# Patient Record
Sex: MALE | Race: WHITE | NOT HISPANIC OR LATINO
[De-identification: names, ages, dates, MRNs, and addresses within clinical notes are randomized per-mention and may not be internally consistent; named-entity substitution may affect disease eponyms.]

---

## 2018-06-12 ENCOUNTER — TRANSCRIPTION ENCOUNTER (OUTPATIENT)
Age: 53
End: 2018-06-12

## 2022-03-08 PROBLEM — Z00.00 ENCOUNTER FOR PREVENTIVE HEALTH EXAMINATION: Status: ACTIVE | Noted: 2022-03-08

## 2022-03-15 ENCOUNTER — APPOINTMENT (OUTPATIENT)
Dept: OTOLARYNGOLOGY | Facility: CLINIC | Age: 57
End: 2022-03-15
Payer: MEDICAID

## 2022-03-15 VITALS
BODY MASS INDEX: 25.33 KG/M2 | SYSTOLIC BLOOD PRESSURE: 185 MMHG | HEART RATE: 106 BPM | WEIGHT: 171 LBS | DIASTOLIC BLOOD PRESSURE: 87 MMHG | HEIGHT: 69 IN

## 2022-03-15 PROCEDURE — 31575 DIAGNOSTIC LARYNGOSCOPY: CPT

## 2022-03-15 PROCEDURE — 99203 OFFICE O/P NEW LOW 30 MIN: CPT | Mod: 25

## 2022-03-15 NOTE — PHYSICAL EXAM
[FreeTextEntry1] : palp firm poorly mobile L level 2 mass [Midline] : trachea located in midline position [Normal] : no rashes

## 2022-03-15 NOTE — PROCEDURE
[Lesion] : lesion identified by mirror examination needing further evaluation [Dysphagia] : dysphagia not clearly evaluated by indirect laryngoscopy [None] : none [Flexible Endoscope] : examined with the flexible endoscope [Mass ___ cm] : [unfilled]Ucm mass on the base of the tongue [Normal] : normal [de-identified] : pt w raised L BOT mass ext to vallec and midline.  Mot clear of ext over PE fold. Nl bilat TVF motion [de-identified] : see above

## 2022-03-15 NOTE — HISTORY OF PRESENT ILLNESS
[de-identified] : 58 yo male who is being referred by Dr. Sultana (otolaryngologist) for suspected neoplasm of BOT.\par 3/4/2022 Ct STN scan confirms mucosal based soft tissue mass of the left oropharynx\par 2/22/2022 Ct maxillofacial scan showing highly suspicious but indeterminate soft tissue fullness noted of oropharynx, left glossotonsillar bot/vallecula measuring approximately 2.5 cm . A suspicious left level 2A lymph node  appears somewhat heterogeneous,  may have some cystic components, measures up to 1.6 cm \par Non smoker, no alcohol\par \par this was found on imaging barnes nasal polyps/? sinusitis-. Prior though pt was having int L tongue pain and ear ache around December .  Imaging did  the above issue.  No wt loss.  Pt not a smoker.

## 2022-03-15 NOTE — CONSULT LETTER
[Dear  ___] : Dear  [unfilled], [Consult Letter:] : I had the pleasure of evaluating your patient, [unfilled]. [Please see my note below.] : Please see my note below. [Consult Closing:] : Thank you very much for allowing me to participate in the care of this patient.  If you have any questions, please do not hesitate to contact me. [Sincerely,] : Sincerely, [FreeTextEntry2] : Erick Sultana MD ( [place], [place]) [FreeTextEntry3] : Ambrocio Tena MD, FACS\par \par    [facility] [place]\par Associate Chair\par    Department of Otolaryngology\par \par Professor\par Otolaryngology & Molecular Medicine\par [facility] [facility] [employer]\par

## 2022-03-17 ENCOUNTER — OUTPATIENT (OUTPATIENT)
Dept: OUTPATIENT SERVICES | Facility: HOSPITAL | Age: 57
LOS: 1 days | End: 2022-03-17
Payer: MEDICAID

## 2022-03-17 VITALS
DIASTOLIC BLOOD PRESSURE: 79 MMHG | WEIGHT: 162.92 LBS | RESPIRATION RATE: 16 BRPM | TEMPERATURE: 99 F | OXYGEN SATURATION: 98 % | HEART RATE: 87 BPM | HEIGHT: 68.5 IN | SYSTOLIC BLOOD PRESSURE: 147 MMHG

## 2022-03-17 DIAGNOSIS — C01 MALIGNANT NEOPLASM OF BASE OF TONGUE: ICD-10-CM

## 2022-03-17 DIAGNOSIS — C77.0 SECONDARY AND UNSPECIFIED MALIGNANT NEOPLASM OF LYMPH NODES OF HEAD, FACE AND NECK: ICD-10-CM

## 2022-03-17 LAB
ALBUMIN SERPL ELPH-MCNC: 5 G/DL — SIGNIFICANT CHANGE UP (ref 3.3–5)
ALP SERPL-CCNC: 56 U/L — SIGNIFICANT CHANGE UP (ref 40–120)
ALT FLD-CCNC: 23 U/L — SIGNIFICANT CHANGE UP (ref 4–41)
ANION GAP SERPL CALC-SCNC: 14 MMOL/L — SIGNIFICANT CHANGE UP (ref 7–14)
AST SERPL-CCNC: 19 U/L — SIGNIFICANT CHANGE UP (ref 4–40)
BILIRUB SERPL-MCNC: 0.6 MG/DL — SIGNIFICANT CHANGE UP (ref 0.2–1.2)
BUN SERPL-MCNC: 18 MG/DL — SIGNIFICANT CHANGE UP (ref 7–23)
CALCIUM SERPL-MCNC: 9.9 MG/DL — SIGNIFICANT CHANGE UP (ref 8.4–10.5)
CHLORIDE SERPL-SCNC: 97 MMOL/L — LOW (ref 98–107)
CO2 SERPL-SCNC: 26 MMOL/L — SIGNIFICANT CHANGE UP (ref 22–31)
CREAT SERPL-MCNC: 0.66 MG/DL — SIGNIFICANT CHANGE UP (ref 0.5–1.3)
EGFR: 109 ML/MIN/1.73M2 — SIGNIFICANT CHANGE UP
GLUCOSE SERPL-MCNC: 91 MG/DL — SIGNIFICANT CHANGE UP (ref 70–99)
HCT VFR BLD CALC: 45.4 % — SIGNIFICANT CHANGE UP (ref 39–50)
HGB BLD-MCNC: 15.4 G/DL — SIGNIFICANT CHANGE UP (ref 13–17)
MCHC RBC-ENTMCNC: 32 PG — SIGNIFICANT CHANGE UP (ref 27–34)
MCHC RBC-ENTMCNC: 33.9 GM/DL — SIGNIFICANT CHANGE UP (ref 32–36)
MCV RBC AUTO: 94.2 FL — SIGNIFICANT CHANGE UP (ref 80–100)
NRBC # BLD: 0 /100 WBCS — SIGNIFICANT CHANGE UP
NRBC # FLD: 0 K/UL — SIGNIFICANT CHANGE UP
PLATELET # BLD AUTO: 244 K/UL — SIGNIFICANT CHANGE UP (ref 150–400)
POTASSIUM SERPL-MCNC: 3.7 MMOL/L — SIGNIFICANT CHANGE UP (ref 3.5–5.3)
POTASSIUM SERPL-SCNC: 3.7 MMOL/L — SIGNIFICANT CHANGE UP (ref 3.5–5.3)
PROT SERPL-MCNC: 7.6 G/DL — SIGNIFICANT CHANGE UP (ref 6–8.3)
RBC # BLD: 4.82 M/UL — SIGNIFICANT CHANGE UP (ref 4.2–5.8)
RBC # FLD: 13.2 % — SIGNIFICANT CHANGE UP (ref 10.3–14.5)
SODIUM SERPL-SCNC: 137 MMOL/L — SIGNIFICANT CHANGE UP (ref 135–145)
WBC # BLD: 9.82 K/UL — SIGNIFICANT CHANGE UP (ref 3.8–10.5)
WBC # FLD AUTO: 9.82 K/UL — SIGNIFICANT CHANGE UP (ref 3.8–10.5)

## 2022-03-17 PROCEDURE — 93010 ELECTROCARDIOGRAM REPORT: CPT

## 2022-03-17 RX ORDER — SODIUM CHLORIDE 9 MG/ML
1000 INJECTION, SOLUTION INTRAVENOUS
Refills: 0 | Status: DISCONTINUED | OUTPATIENT
Start: 2022-03-24 | End: 2022-04-08

## 2022-03-17 NOTE — H&P PST ADULT - HISTORY OF PRESENT ILLNESS
56 y/o male  with hx of sinusitis, nasal polyps, s/p CT scan imaging.  Neoplasm of base of tongue incidentally discovered on imaging.  Scheduled for Direct laryngoscopy oropharynx biopsy

## 2022-03-17 NOTE — H&P PST ADULT - ENMT COMMENTS
hx of sinusitis, nasal polyps, and pain to tongue. s/p CT scan imaging.  Neoplasm of base of tongue incidentally discovered on imaging.  s/p 5 day course of prednisone taper which completed several days ago. Scheduled for Direct laryngoscopy oropharynx biopsy neoplasm of base of tongue per dx

## 2022-03-17 NOTE — H&P PST ADULT - NSICDXPASTMEDICALHX_GEN_ALL_CORE_FT
PAST MEDICAL HISTORY:  Anxiety     Asthma hx of hospitalization 20 years ago.  presesntly Well controlled with present medication regimen     PAST MEDICAL HISTORY:  Anxiety     Asthma hx of hospitalization 20 years ago.  presesntly Well controlled with present medication regimen    Neoplasm of base of tongue

## 2022-03-17 NOTE — H&P PST ADULT - NSANTHOSAYNRD_GEN_A_CORE
No. YUKI screening performed.  STOP BANG Legend: 0-2 = LOW Risk; 3-4 = INTERMEDIATE Risk; 5-8 = HIGH Risk

## 2022-03-17 NOTE — H&P PST ADULT - NEUROLOGICAL
From: Jo Ann Campos  To: Clotilde Kapoor MD  Sent: 5/20/2019 11:22 PM EDT  Subject: Non-Urgent Medical Question    Hi there it's been 3 months since I started taking a larger dosage for my thyroid problem. Can you give permission for me to visit the lab this week for a current blood test to see if medicine is working?  Started with 25 mg and have been taking 50mg for three months now  [employer] negative detailed exam

## 2022-03-17 NOTE — H&P PST ADULT - RESPIRATORY AND THORAX COMMENTS
Hx of asthma, hospitalized x1 20 years ago.  Now  well controlled on present medication.  Uses rescue ezequiel haler approx 1x/wk

## 2022-03-23 ENCOUNTER — TRANSCRIPTION ENCOUNTER (OUTPATIENT)
Age: 57
End: 2022-03-23

## 2022-03-23 NOTE — ASU PATIENT PROFILE, ADULT - FALL HARM RISK - UNIVERSAL INTERVENTIONS
Bed in lowest position, wheels locked, appropriate side rails in place/Call bell, personal items and telephone in reach/Instruct patient to call for assistance before getting out of bed or chair/Non-slip footwear when patient is out of bed/[place] to call system/Physically safe environment - no spills, clutter or unnecessary equipment/Purposeful Proactive Rounding/Room/bathroom lighting operational, light cord in reach

## 2022-03-23 NOTE — ASU PATIENT PROFILE, ADULT - NSICDXPASTMEDICALHX_GEN_ALL_CORE_FT
PAST MEDICAL HISTORY:  Anxiety     Asthma hx of hospitalization 20 years ago.  presesntly Well controlled with present medication regimen    Neoplasm of base of tongue

## 2022-03-24 ENCOUNTER — RESULT REVIEW (OUTPATIENT)
Age: 57
End: 2022-03-24

## 2022-03-24 ENCOUNTER — TRANSCRIPTION ENCOUNTER (OUTPATIENT)
Age: 57
End: 2022-03-24

## 2022-03-24 ENCOUNTER — APPOINTMENT (OUTPATIENT)
Dept: OTOLARYNGOLOGY | Facility: HOSPITAL | Age: 57
End: 2022-03-24

## 2022-03-24 ENCOUNTER — OUTPATIENT (OUTPATIENT)
Dept: OUTPATIENT SERVICES | Facility: HOSPITAL | Age: 57
LOS: 1 days | Discharge: HOME | End: 2022-03-24
Payer: MEDICAID

## 2022-03-24 VITALS
TEMPERATURE: 98 F | HEART RATE: 78 BPM | DIASTOLIC BLOOD PRESSURE: 71 MMHG | OXYGEN SATURATION: 99 % | SYSTOLIC BLOOD PRESSURE: 140 MMHG | WEIGHT: 162.04 LBS | HEIGHT: 68 IN | RESPIRATION RATE: 16 BRPM

## 2022-03-24 VITALS
RESPIRATION RATE: 15 BRPM | HEART RATE: 74 BPM | OXYGEN SATURATION: 94 % | SYSTOLIC BLOOD PRESSURE: 152 MMHG | DIASTOLIC BLOOD PRESSURE: 89 MMHG

## 2022-03-24 DIAGNOSIS — C01 MALIGNANT NEOPLASM OF BASE OF TONGUE: ICD-10-CM

## 2022-03-24 PROCEDURE — 88334 PATH CONSLTJ SURG CYTO XM EA: CPT | Mod: 26,59

## 2022-03-24 PROCEDURE — 88342 IMHCHEM/IMCYTCHM 1ST ANTB: CPT | Mod: 26,59

## 2022-03-24 PROCEDURE — 88367 INSITU HYBRIDIZATION AUTO: CPT | Mod: 26

## 2022-03-24 PROCEDURE — 88331 PATH CONSLTJ SURG 1 BLK 1SPC: CPT | Mod: 26

## 2022-03-24 PROCEDURE — 88365 INSITU HYBRIDIZATION (FISH): CPT | Mod: 26,59

## 2022-03-24 PROCEDURE — 31535 LARYNGOSCOPY W/BIOPSY: CPT | Mod: GC

## 2022-03-24 PROCEDURE — 88305 TISSUE EXAM BY PATHOLOGIST: CPT | Mod: 26

## 2022-03-24 PROCEDURE — 88341 IMHCHEM/IMCYTCHM EA ADD ANTB: CPT | Mod: 26,59

## 2022-03-24 RX ORDER — OXYCODONE HYDROCHLORIDE 5 MG/1
5 TABLET ORAL ONCE
Refills: 0 | Status: DISCONTINUED | OUTPATIENT
Start: 2022-03-24 | End: 2022-03-24

## 2022-03-24 RX ORDER — OXYCODONE HYDROCHLORIDE 5 MG/1
1 TABLET ORAL
Qty: 8 | Refills: 0
Start: 2022-03-24 | End: 2022-03-25

## 2022-03-24 RX ORDER — OXYCODONE HYDROCHLORIDE 5 MG/1
5 TABLET ORAL EVERY 6 HOURS
Refills: 0 | Status: DISCONTINUED | OUTPATIENT
Start: 2022-03-24 | End: 2022-03-24

## 2022-03-24 RX ORDER — FENTANYL CITRATE 50 UG/ML
25 INJECTION INTRAVENOUS
Refills: 0 | Status: DISCONTINUED | OUTPATIENT
Start: 2022-03-24 | End: 2022-03-24

## 2022-03-24 RX ORDER — OXYCODONE HYDROCHLORIDE 5 MG/1
10 TABLET ORAL EVERY 6 HOURS
Refills: 0 | Status: DISCONTINUED | OUTPATIENT
Start: 2022-03-24 | End: 2022-03-24

## 2022-03-24 RX ORDER — ONDANSETRON 8 MG/1
4 TABLET, FILM COATED ORAL ONCE
Refills: 0 | Status: DISCONTINUED | OUTPATIENT
Start: 2022-03-24 | End: 2022-04-08

## 2022-03-24 RX ORDER — SODIUM CHLORIDE 9 MG/ML
1000 INJECTION, SOLUTION INTRAVENOUS
Refills: 0 | Status: DISCONTINUED | OUTPATIENT
Start: 2022-03-24 | End: 2022-04-08

## 2022-03-24 RX ORDER — ACETAMINOPHEN 500 MG
650 TABLET ORAL EVERY 6 HOURS
Refills: 0 | Status: DISCONTINUED | OUTPATIENT
Start: 2022-03-24 | End: 2022-04-08

## 2022-03-24 NOTE — ASU DISCHARGE PLAN (ADULT/PEDIATRIC) - CARE PROVIDER_API CALL
Ambrocio Tena)  [facility]; Otolaryngology  [address] [place] [id number]  Phone: [phone]  Fax: [phone]  Follow Up Time:

## 2022-03-24 NOTE — ASU DISCHARGE PLAN (ADULT/PEDIATRIC) - NS MD DC FALL RISK RISK
For information on Fall & Injury Prevention, visit: [email or web address] OR  [email or web address] OR  https://www.cdc.gov/steadi/patient.html

## 2022-03-24 NOTE — ASU DISCHARGE PLAN (ADULT/PEDIATRIC) - ASU DC SPECIAL INSTRUCTIONSFT
Take tylenol or oxycodone for pain as needed.    Call clinic or come to ED if issues breathing or bleeding from mouth that does not stop.    Warning Signs:  Please call your doctor or nurse practitioner if you experience the following:  *You experience new chest pain, pressure, squeezing or tightness.  *New or worsening cough, shortness of breath, or wheeze.  *If you are vomiting and cannot keep down fluids or your medications.  *You are getting dehydrated due to continued vomiting, diarrhea, or other reasons. Signs of dehydration include dry mouth, rapid heartbeat, or feeling dizzy or faint when standing.  *You experience burning when you urinate, have blood in your urine, or experience a discharge.  *Your pain is not improving within 8-12 hours or is not gone within 24 hours.  *You have shaking chills, or fever greater than 101.5 degrees Fahrenheit or 38 degrees Celsius.  *Any change in your symptoms, or any new symptoms that concern you.

## 2022-03-28 ENCOUNTER — NON-APPOINTMENT (OUTPATIENT)
Age: 57
End: 2022-03-28

## 2022-03-28 PROBLEM — F41.9 ANXIETY DISORDER, UNSPECIFIED: Chronic | Status: ACTIVE | Noted: 2022-03-17

## 2022-03-28 PROBLEM — J45.909 UNSPECIFIED ASTHMA, UNCOMPLICATED: Chronic | Status: ACTIVE | Noted: 2022-03-17

## 2022-03-28 PROBLEM — D49.0 NEOPLASM OF UNSPECIFIED BEHAVIOR OF DIGESTIVE SYSTEM: Chronic | Status: ACTIVE | Noted: 2022-03-17

## 2022-03-29 ENCOUNTER — NON-APPOINTMENT (OUTPATIENT)
Age: 57
End: 2022-03-29

## 2022-03-30 ENCOUNTER — OUTPATIENT (OUTPATIENT)
Dept: OUTPATIENT SERVICES | Facility: HOSPITAL | Age: 57
LOS: 1 days | Discharge: HOME | End: 2022-03-30

## 2022-03-30 DIAGNOSIS — C02.9 MALIGNANT NEOPLASM OF TONGUE, UNSPECIFIED: ICD-10-CM

## 2022-03-31 LAB — SURGICAL PATHOLOGY STUDY: SIGNIFICANT CHANGE UP

## 2022-04-01 ENCOUNTER — APPOINTMENT (OUTPATIENT)
Dept: RADIATION ONCOLOGY | Facility: CLINIC | Age: 57
End: 2022-04-01
Payer: MEDICAID

## 2022-04-01 ENCOUNTER — NON-APPOINTMENT (OUTPATIENT)
Age: 57
End: 2022-04-01

## 2022-04-01 ENCOUNTER — APPOINTMENT (OUTPATIENT)
Dept: HEMATOLOGY ONCOLOGY | Facility: CLINIC | Age: 57
End: 2022-04-01
Payer: MEDICAID

## 2022-04-01 ENCOUNTER — RESULT REVIEW (OUTPATIENT)
Age: 57
End: 2022-04-01

## 2022-04-01 VITALS
HEIGHT: 69 IN | HEART RATE: 75 BPM | DIASTOLIC BLOOD PRESSURE: 79 MMHG | SYSTOLIC BLOOD PRESSURE: 153 MMHG | BODY MASS INDEX: 24.44 KG/M2 | OXYGEN SATURATION: 96 % | WEIGHT: 165.04 LBS

## 2022-04-01 VITALS
WEIGHT: 166 LBS | RESPIRATION RATE: 16 BRPM | HEART RATE: 84 BPM | DIASTOLIC BLOOD PRESSURE: 74 MMHG | BODY MASS INDEX: 24.59 KG/M2 | SYSTOLIC BLOOD PRESSURE: 166 MMHG | OXYGEN SATURATION: 99 % | HEIGHT: 69 IN

## 2022-04-01 DIAGNOSIS — Z86.59 PERSONAL HISTORY OF OTHER MENTAL AND BEHAVIORAL DISORDERS: ICD-10-CM

## 2022-04-01 DIAGNOSIS — Z78.9 OTHER SPECIFIED HEALTH STATUS: ICD-10-CM

## 2022-04-01 DIAGNOSIS — Z80.1 FAMILY HISTORY OF MALIGNANT NEOPLASM OF TRACHEA, BRONCHUS AND LUNG: ICD-10-CM

## 2022-04-01 LAB
BASOPHILS # BLD AUTO: 0.1 K/UL — SIGNIFICANT CHANGE UP (ref 0–0.2)
BASOPHILS NFR BLD AUTO: 1.4 % — SIGNIFICANT CHANGE UP (ref 0–2)
EOSINOPHIL # BLD AUTO: 1 K/UL — HIGH (ref 0–0.5)
EOSINOPHIL NFR BLD AUTO: 12.8 % — HIGH (ref 0–6)
HCT VFR BLD CALC: 45.5 % — SIGNIFICANT CHANGE UP (ref 39–50)
HGB BLD-MCNC: 14.8 G/DL — SIGNIFICANT CHANGE UP (ref 13–17)
LYMPHOCYTES # BLD AUTO: 1.7 K/UL — SIGNIFICANT CHANGE UP (ref 1–3.3)
LYMPHOCYTES # BLD AUTO: 21.4 % — SIGNIFICANT CHANGE UP (ref 13–44)
MCHC RBC-ENTMCNC: 32.2 PG — SIGNIFICANT CHANGE UP (ref 27–34)
MCHC RBC-ENTMCNC: 32.5 G/DL — SIGNIFICANT CHANGE UP (ref 32–36)
MCV RBC AUTO: 99.1 FL — SIGNIFICANT CHANGE UP (ref 80–100)
MONOCYTES # BLD AUTO: 0.7 K/UL — SIGNIFICANT CHANGE UP (ref 0–0.9)
MONOCYTES NFR BLD AUTO: 8.6 % — SIGNIFICANT CHANGE UP (ref 2–14)
NEUTROPHILS # BLD AUTO: 4.3 K/UL — SIGNIFICANT CHANGE UP (ref 1.8–7.4)
NEUTROPHILS NFR BLD AUTO: 55.8 % — SIGNIFICANT CHANGE UP (ref 43–77)
PLATELET # BLD AUTO: 201 K/UL — SIGNIFICANT CHANGE UP (ref 150–400)
RBC # BLD: 4.59 M/UL — SIGNIFICANT CHANGE UP (ref 4.2–5.8)
RBC # FLD: 12.3 % — SIGNIFICANT CHANGE UP (ref 10.3–14.5)
WBC # BLD: 7.7 K/UL — SIGNIFICANT CHANGE UP (ref 3.8–10.5)
WBC # FLD AUTO: 7.7 K/UL — SIGNIFICANT CHANGE UP (ref 3.8–10.5)

## 2022-04-01 PROCEDURE — 99205 OFFICE O/P NEW HI 60 MIN: CPT | Mod: 25

## 2022-04-01 PROCEDURE — 99205 OFFICE O/P NEW HI 60 MIN: CPT

## 2022-04-01 RX ORDER — GABAPENTIN 600 MG/1
600 TABLET, COATED ORAL
Refills: 0 | Status: ACTIVE | COMMUNITY

## 2022-04-01 RX ORDER — FLUTICASONE PROPIONATE 50 MCG
SPRAY, SUSPENSION NASAL
Refills: 0 | Status: ACTIVE | COMMUNITY

## 2022-04-01 NOTE — ASSESSMENT
[FreeTextEntry1] : 57 year old man with no significant past medical history who presents to the oncology clinic with locally advanced oropharyngeal cancer, cT4N, who presents to the hematolgoy clinic to discuss definitive cRT \par \par # oropharyngeal cancer \par - I explained to the patient the natural history of head and neck cancer, and explained the role of chemotherapy as part of definitive treatment to be given with radiation. I also explained the risks and benefits of giving chemotherapy weekly or every 3 weeks. The patient was agreeable to treatment with cisplatin to be given weekly.  I consented the patient for treatment. Patient was consented for cisplatin\par - nutrition consult\par - gastroenterology consult for early referral in case PEG is needed in the future. \par - follow PET-CT imaging prior to initiation of therapy. \par - follow up in 4 weeks

## 2022-04-01 NOTE — RESULTS/DATA
[FreeTextEntry1] : 3/24/22 Pathology:  Tongue, left base of tongue tumor, excision:  \par Squamous cell carcinoma, non-keratinizing.  \par p16 and HPV stains are pending and an addendum will be issued

## 2022-04-01 NOTE — PHYSICAL EXAM
[Fully active, able to carry on all pre-disease performance without restriction] : Status 0 - Fully active, able to carry on all pre-disease performance without restriction [Thin] : thin [Normal] : affect appropriate [de-identified] : palpable cervical lymph node on the left

## 2022-04-01 NOTE — LETTER CLOSING
[FreeTextEntry3] : Abelino Kennedy MD\par  of Radiation Medicine, Quality and Safety\par  of Radiation Medicine\par [facility] [employer] [facility]\par [facility]\par [facility]\par

## 2022-04-01 NOTE — DISEASE MANAGEMENT
[N/A] : Currently not applicable [FreeTextEntry4] : SCCa BOT, p16 pending [TTNM] : 4 [NTNM] : 1 [MTNM] : x

## 2022-04-01 NOTE — REVIEW OF SYSTEMS
[Negative] : Allergic/Immunologic [Edema Limbs: Grade 0] : Edema Limbs: Grade 0  [Fatigue: Grade 0] : Fatigue: Grade 0 [Localized Edema: Grade 0] : Localized Edema: Grade 0  [Neck Edema: Grade 0] : Neck Edema: Grade 0 [Oral Pain: Grade 1 - Mild pain] : Oral Pain: Grade 1 - Mild pain [Dysgeusia: Grade 1- Altered taste but no change in diet] : Dysgeusia: Grade 1 - Altered taste but no change in diet [Hoarseness: Grade 0] : Hoarseness: Grade 0 [Voice Alteration: Grade 0] : Voice Alteration: Grade 0

## 2022-04-01 NOTE — PHYSICAL EXAM
[Nondistended] : nondistended [Sclera] : the sclera and conjunctiva were normal [Extraocular Movements] : extraocular movements were intact [Normal] : normal skin color and pigmentation and no rash [de-identified] : good dentition, no visible oropharynx lesions [de-identified] : no palpable lymphadenopathy

## 2022-04-01 NOTE — HISTORY OF PRESENT ILLNESS
[de-identified] : 56 yo M with h/o chronic sinusitis who was diagnosed with SCC of the left BOT in March 2022.\par \par He saw ENT, Dr. Erick Sultana, c/o headaches, facial pain and left ear pain.  CT maxillofacial sinus on 2/22/22 showed highly suspicious soft tissue fullness oropharynx left glossotonsilar sulcus, base of tongue/vallecula measuring 2.5 x 2.3 concerning for neoplasm. Suspicious left level 2A lymph node measure 1.6 cm.  Follow up CT neck on 3/4/22 showed soft tissue mass left oropharynx 28.5 x 23.9 x 34.3 mm with involvement of the intrinsic tongue muscles (T4).  2 unilateral pathologic left 2A lymph nodes with cystic necrosis concerning for tory metastasis (N1).\par \par He next saw ENT surgery, Dr. Renny Tena, where he was scheduled for EUA on 3/24/22 which stated "by palpation, the patient was found to have a broadly based tumor epicentered in his tongue base.  Anteriorly, it extended to the proximal tongue base circumvallate papilla region.  It extended laterally to, but not  across the glossotonsillar sulcus anteriorly and more posteriorly, laterally it extended to, but not across the glossopharyngeal sulcus.  The tumor seemed to extend just to the midline both in its anterior and posterior extent.  The tumor seemed to approach, but not cross into the vallecula."  Biopsy c/w non-keratinizing SCC.\par \par Staging PET pending for 4/8/22.  [de-identified] : The patient feels well.  He denies SOB, difficulty swallowing. He denies neuropathies at baseline including numbness and tingling in toes and feet, but does have slight tinnitus at baseline that does not bother him. He recently had an episode of shingles, but states that it is well healed.

## 2022-04-01 NOTE — HISTORY OF PRESENT ILLNESS
[FreeTextEntry1] : 57 year old man presents today for consultation regarding radiation therapy for head & neck carcinoma.\par \par He met with Dr Erick Sultana (ENT)\par \par 2/22/22 CT maxifacial  sinus showed a highly suspicious soft tissue fullness in oropharynx, left glossotonsilar sulcus, base of tongue/vallecula measuring 2.5 x 2.3 cm. There was a suspicious left level IIA LN measuring measure 1.6 cm.\par \par 3/4/22 CT neck showed a soft tissue mass in left oropharynx measuring 3.4 x 2.8 x 2.4 cm, with involvement of the extrinsic tongue muscles.  There were two unilateral left IIA LNs\par \par 3/24/22 EUA showed a broadly based tumor epicentered in his tongue base, extending anteriorly to the proximal tongue base circumvallate papilla region, laterally to but not across the glossotonsillar sulcus, posterolaterally to but not across the glossopharyngeal sulcus.  The tumor seemed to extend just to the midline both in its anterior and posterior extent.  The tumor seemed to approach,  but not cross into the vallecula.  The pharyngoepiglottic fold was also free.  Biopsy of the left tongue base showed squamous cell carcinoma, p16/HPV stains still pending.\par \par Reports intermittent left otalgia/oral/tongue pain, 7/10, slight dysgeusia following biopsy. Denies xerostomia, odynophagia, dysphagia voice alteration, mastication changes, fatigue or weight loss.

## 2022-04-01 NOTE — VITALS
[Maximal Pain Intensity: 7/10] : 7/10 [Least Pain Intensity: 0/10] : 0/10 [Pain Description/Quality: ___] : Pain description/quality: [unfilled] [Pain Duration: ___] : Pain duration: [unfilled] [Pain Location: ___] : Pain Location: [unfilled] [Pain Interferes with ADLs] : Pain interferes with activities of daily living. [OTC] : OTC [Adjuvant (neuroleptics)] : adjuvant (neuroleptics) [90: Able to carry normal activity; minor signs or symptoms of disease.] : 90: Able to carry normal activity; minor signs or symptoms of disease.  [Date: ____________] : Patient's last distress assessment performed on [unfilled]. [7 - Distress Level] : Distress Level: 7 [Referred Patient  to social work for follow-up] : Patient was referred to social work for follow-up

## 2022-04-04 LAB
ALBUMIN SERPL ELPH-MCNC: 4.9 G/DL
ALP BLD-CCNC: 53 U/L
ALT SERPL-CCNC: 31 U/L
ANION GAP SERPL CALC-SCNC: 13 MMOL/L
APTT BLD: 28.4 SEC
AST SERPL-CCNC: 25 U/L
BILIRUB SERPL-MCNC: 0.5 MG/DL
BUN SERPL-MCNC: 19 MG/DL
CALCIUM SERPL-MCNC: 9.9 MG/DL
CHLORIDE SERPL-SCNC: 101 MMOL/L
CO2 SERPL-SCNC: 26 MMOL/L
CREAT SERPL-MCNC: 0.65 MG/DL
EGFR: 109 ML/MIN/1.73M2
GLUCOSE SERPL-MCNC: 98 MG/DL
HAV IGM SER QL: NONREACTIVE
HBV CORE IGM SER QL: NONREACTIVE
HBV SURFACE AG SER QL: NONREACTIVE
HCV AB SER QL: NONREACTIVE
HCV S/CO RATIO: 0.14 S/CO
INR PPP: 1.03 RATIO
MAGNESIUM SERPL-MCNC: 2.3 MG/DL
POTASSIUM SERPL-SCNC: 5 MMOL/L
PROT SERPL-MCNC: 7.2 G/DL
PT BLD: 12.2 SEC
SODIUM SERPL-SCNC: 140 MMOL/L
TSH SERPL-ACNC: 0.49 UIU/ML

## 2022-04-05 ENCOUNTER — NON-APPOINTMENT (OUTPATIENT)
Age: 57
End: 2022-04-05

## 2022-04-07 ENCOUNTER — APPOINTMENT (OUTPATIENT)
Dept: GASTROENTEROLOGY | Facility: CLINIC | Age: 57
End: 2022-04-07
Payer: MEDICAID

## 2022-04-07 VITALS
DIASTOLIC BLOOD PRESSURE: 84 MMHG | HEART RATE: 80 BPM | WEIGHT: 159 LBS | HEIGHT: 69 IN | RESPIRATION RATE: 14 BRPM | BODY MASS INDEX: 23.55 KG/M2 | OXYGEN SATURATION: 98 % | SYSTOLIC BLOOD PRESSURE: 122 MMHG

## 2022-04-07 PROCEDURE — 99204 OFFICE O/P NEW MOD 45 MIN: CPT

## 2022-04-07 PROCEDURE — 99214 OFFICE O/P EST MOD 30 MIN: CPT

## 2022-04-07 RX ORDER — ONDANSETRON 8 MG/1
8 TABLET, ORALLY DISINTEGRATING ORAL EVERY 8 HOURS
Qty: 90 | Refills: 3 | Status: DISCONTINUED | COMMUNITY
Start: 2022-04-01 | End: 2022-04-07

## 2022-04-07 RX ORDER — PROCHLORPERAZINE MALEATE 10 MG/1
10 TABLET ORAL EVERY 6 HOURS
Qty: 120 | Refills: 2 | Status: DISCONTINUED | COMMUNITY
Start: 2022-04-01 | End: 2022-04-07

## 2022-04-07 NOTE — PHYSICAL EXAM
[General Appearance - Alert] : alert [General Appearance - In No Acute Distress] : in no acute distress [Sclera] : the sclera and conjunctiva were normal [PERRL With Normal Accommodation] : pupils were equal in size, round, and reactive to light [Extraocular Movements] : extraocular movements were intact [Outer Ear] : the ears and nose were normal in appearance [Oropharynx] : the oropharynx was normal [Neck Appearance] : the appearance of the neck was normal [Neck Cervical Mass (___cm)] : no neck mass was observed [] : no respiratory distress [Auscultation Breath Sounds / Voice Sounds] : lungs were clear to auscultation bilaterally [Heart Rate And Rhythm] : heart rate was normal and rhythm regular [Heart Sounds] : normal S1 and S2 [Heart Sounds Gallop] : no gallops [Murmurs] : no murmurs [Heart Sounds Pericardial Friction Rub] : no pericardial rub [Bowel Sounds] : normal bowel sounds [Abdomen Soft] : soft [Abdomen Tenderness] : non-tender [No Focal Deficits] : no focal deficits [Oriented To Time, Place, And Person] : oriented to person, place, and time [Impaired Insight] : insight and judgment were intact [Affect] : the affect was normal

## 2022-04-07 NOTE — ASSESSMENT
[FreeTextEntry1] : Patient is a 57 year old male, recently diagnosed with base of tongue cancer, SCC, who presents for consult to consider PEG placement. \par \par Pt is under the care of ENT and oncology. Has a 28.5mm x 23.9mm x 34.3mm soft tissue mass of the left oropharynx noted on CT neck on 3/4/22. Biopsy confirmed nonkeratinizing SCC and he plans for radiation and chemotherapy to start in upcoming weeks. \par \par EGD with PEG placement to be scheduled. Indication, risks and benefit of EGD with PEG discussed with patient in detail. All questions answered. Patient is medically optimized for procedure. \par \par Labs done last week, reviewed.

## 2022-04-07 NOTE — HISTORY OF PRESENT ILLNESS
[de-identified] : Patient is a 57 year old male, recently diagnosed with base of tongue cancer, SCC, who presents for consult to consider PEG placement. \par \par Pt is under the care of ENT and oncology. Has a 28.5mm x 23.9mm x 34.3mm soft tissue mass of the left oropharynx noted on CT neck on 3/4/22. Biopsy confirmed non- keratinizing SCC and he plans for radiation and chemotherapy to start in upcoming weeks. \par \par Pt is currently under the care of speech therapy. He has a PET CT scheduled for tomorrow.\par \par Patient denies any significant cardiac or pulmonary conditions. \par \par Patient denies pyrosis, dysphagia, abdominal pain, change in BMs, rectal bleeding, nausea, vomiting, or unexplained weight loss.

## 2022-04-08 ENCOUNTER — OUTPATIENT (OUTPATIENT)
Dept: OUTPATIENT SERVICES | Facility: HOSPITAL | Age: 57
LOS: 1 days | End: 2022-04-08

## 2022-04-08 ENCOUNTER — APPOINTMENT (OUTPATIENT)
Dept: NUCLEAR MEDICINE | Facility: CLINIC | Age: 57
End: 2022-04-08
Payer: MEDICAID

## 2022-04-08 ENCOUNTER — OUTPATIENT (OUTPATIENT)
Dept: OUTPATIENT SERVICES | Facility: HOSPITAL | Age: 57
LOS: 1 days | Discharge: HOME | End: 2022-04-08
Payer: MEDICAID

## 2022-04-08 DIAGNOSIS — Z00.8 ENCOUNTER FOR OTHER GENERAL EXAMINATION: ICD-10-CM

## 2022-04-08 PROCEDURE — 78815 PET IMAGE W/CT SKULL-THIGH: CPT | Mod: 26,PI

## 2022-04-08 PROCEDURE — 77263 THER RADIOLOGY TX PLNG CPLX: CPT

## 2022-04-11 PROCEDURE — 77470 SPECIAL RADIATION TREATMENT: CPT | Mod: 26

## 2022-04-19 ENCOUNTER — NON-APPOINTMENT (OUTPATIENT)
Age: 57
End: 2022-04-19

## 2022-04-19 ENCOUNTER — APPOINTMENT (OUTPATIENT)
Dept: OTOLARYNGOLOGY | Facility: CLINIC | Age: 57
End: 2022-04-19
Payer: MEDICAID

## 2022-04-19 VITALS
HEIGHT: 69 IN | WEIGHT: 160 LBS | BODY MASS INDEX: 23.7 KG/M2 | SYSTOLIC BLOOD PRESSURE: 154 MMHG | HEART RATE: 78 BPM | DIASTOLIC BLOOD PRESSURE: 87 MMHG

## 2022-04-19 PROCEDURE — 99213 OFFICE O/P EST LOW 20 MIN: CPT

## 2022-04-19 NOTE — CONSULT LETTER
[Dear  ___] : Dear  [unfilled], [Consult Letter:] : I had the pleasure of evaluating your patient, [unfilled]. [Please see my note below.] : Please see my note below. [Consult Closing:] : Thank you very much for allowing me to participate in the care of this patient.  If you have any questions, please do not hesitate to contact me. [Sincerely,] : Sincerely, [FreeTextEntry2] : Erick Sultana MD ( [place], [place]) [FreeTextEntry3] : Ambrocio Tena MD, FACS\par \par    [facility] [place]\par Associate Chair\par    Department of Otolaryngology\par \par Professor\par Otolaryngology & Molecular Medicine\par [employer] [facility] [employer]\par

## 2022-04-19 NOTE — HISTORY OF PRESENT ILLNESS
[de-identified] : 57 year old male with SCCa of the left BOT with metastasis to the left neck s/p biopsy 03/24/2022. Reports neck pain secondary to tumor. Reports having bleeding inside his mouth after eating a bagel, has resolved. Denies fever, dysphagia and dyspnea.\par \par Has met w Multi D team at [facility] as well as [facility]. Mask is made.  for management planning.

## 2022-04-25 PROCEDURE — 77300 RADIATION THERAPY DOSE PLAN: CPT | Mod: 26

## 2022-04-25 PROCEDURE — 77334 RADIATION TREATMENT AID(S): CPT | Mod: 26

## 2022-04-25 PROCEDURE — 77301 RADIOTHERAPY DOSE PLAN IMRT: CPT | Mod: 26

## 2022-04-26 ENCOUNTER — RESULT REVIEW (OUTPATIENT)
Age: 57
End: 2022-04-26

## 2022-04-26 ENCOUNTER — APPOINTMENT (OUTPATIENT)
Dept: HEMATOLOGY ONCOLOGY | Facility: CLINIC | Age: 57
End: 2022-04-26

## 2022-04-26 LAB
ANISOCYTOSIS BLD QL: SLIGHT — SIGNIFICANT CHANGE UP
BASOPHILS # BLD AUTO: 0.1 K/UL — SIGNIFICANT CHANGE UP (ref 0–0.2)
BASOPHILS NFR BLD AUTO: 1 % — SIGNIFICANT CHANGE UP (ref 0–2)
ELLIPTOCYTES BLD QL SMEAR: SLIGHT — SIGNIFICANT CHANGE UP
EOSINOPHIL # BLD AUTO: 2.1 K/UL — HIGH (ref 0–0.5)
EOSINOPHIL NFR BLD AUTO: 22 % — HIGH (ref 0–6)
HCT VFR BLD CALC: 45.2 % — SIGNIFICANT CHANGE UP (ref 39–50)
HGB BLD-MCNC: 14.7 G/DL — SIGNIFICANT CHANGE UP (ref 13–17)
LYMPHOCYTES # BLD AUTO: 2.4 K/UL — SIGNIFICANT CHANGE UP (ref 1–3.3)
LYMPHOCYTES # BLD AUTO: 21 % — SIGNIFICANT CHANGE UP (ref 13–44)
MACROCYTES BLD QL: SLIGHT — SIGNIFICANT CHANGE UP
MCHC RBC-ENTMCNC: 32.3 PG — SIGNIFICANT CHANGE UP (ref 27–34)
MCHC RBC-ENTMCNC: 32.6 G/DL — SIGNIFICANT CHANGE UP (ref 32–36)
MCV RBC AUTO: 98.9 FL — SIGNIFICANT CHANGE UP (ref 80–100)
MICROCYTES BLD QL: SLIGHT — SIGNIFICANT CHANGE UP
MONOCYTES # BLD AUTO: 0.7 K/UL — SIGNIFICANT CHANGE UP (ref 0–0.9)
MONOCYTES NFR BLD AUTO: 7 % — SIGNIFICANT CHANGE UP (ref 2–14)
NEUTROPHILS # BLD AUTO: 5.4 K/UL — SIGNIFICANT CHANGE UP (ref 1.8–7.4)
NEUTROPHILS NFR BLD AUTO: 49 % — SIGNIFICANT CHANGE UP (ref 43–77)
OVALOCYTES BLD QL SMEAR: SLIGHT — SIGNIFICANT CHANGE UP
PLAT MORPH BLD: NORMAL — SIGNIFICANT CHANGE UP
PLATELET # BLD AUTO: 242 K/UL — SIGNIFICANT CHANGE UP (ref 150–400)
POIKILOCYTOSIS BLD QL AUTO: SLIGHT — SIGNIFICANT CHANGE UP
POLYCHROMASIA BLD QL SMEAR: SLIGHT — SIGNIFICANT CHANGE UP
RBC # BLD: 4.57 M/UL — SIGNIFICANT CHANGE UP (ref 4.2–5.8)
RBC # FLD: 12.2 % — SIGNIFICANT CHANGE UP (ref 10.3–14.5)
RBC BLD AUTO: SIGNIFICANT CHANGE UP
WBC # BLD: 10.7 K/UL — HIGH (ref 3.8–10.5)
WBC # FLD AUTO: 10.7 K/UL — HIGH (ref 3.8–10.5)

## 2022-04-27 ENCOUNTER — NON-APPOINTMENT (OUTPATIENT)
Age: 57
End: 2022-04-27

## 2022-04-27 ENCOUNTER — APPOINTMENT (OUTPATIENT)
Age: 57
End: 2022-04-27

## 2022-04-27 LAB
ALBUMIN SERPL ELPH-MCNC: 4.8 G/DL
ALP BLD-CCNC: 69 U/L
ALT SERPL-CCNC: 24 U/L
ANION GAP SERPL CALC-SCNC: 13 MMOL/L
AST SERPL-CCNC: 22 U/L
BILIRUB SERPL-MCNC: 0.2 MG/DL
BUN SERPL-MCNC: 16 MG/DL
CALCIUM SERPL-MCNC: 9.2 MG/DL
CHLORIDE SERPL-SCNC: 100 MMOL/L
CO2 SERPL-SCNC: 28 MMOL/L
CREAT SERPL-MCNC: 0.83 MG/DL
EGFR: 101 ML/MIN/1.73M2
GLUCOSE SERPL-MCNC: 96 MG/DL
MAGNESIUM SERPL-MCNC: 2.2 MG/DL
POTASSIUM SERPL-SCNC: 4.6 MMOL/L
PROT SERPL-MCNC: 6.9 G/DL
SODIUM SERPL-SCNC: 140 MMOL/L

## 2022-04-27 RX ORDER — ERGOCALCIFEROL 1.25 MG/1
1 CAPSULE ORAL
Qty: 0 | Refills: 0 | DISCHARGE

## 2022-04-27 RX ORDER — OMEGA-3 ACID ETHYL ESTERS 1 G
1 CAPSULE ORAL
Qty: 0 | Refills: 0 | DISCHARGE

## 2022-04-28 ENCOUNTER — APPOINTMENT (OUTPATIENT)
Age: 57
End: 2022-04-28

## 2022-04-28 ENCOUNTER — NON-APPOINTMENT (OUTPATIENT)
Age: 57
End: 2022-04-28

## 2022-04-28 VITALS
DIASTOLIC BLOOD PRESSURE: 81 MMHG | BODY MASS INDEX: 25.25 KG/M2 | RESPIRATION RATE: 16 BRPM | WEIGHT: 171 LBS | HEART RATE: 89 BPM | OXYGEN SATURATION: 98 % | SYSTOLIC BLOOD PRESSURE: 160 MMHG

## 2022-04-28 DIAGNOSIS — Z51.11 ENCOUNTER FOR ANTINEOPLASTIC CHEMOTHERAPY: ICD-10-CM

## 2022-04-28 DIAGNOSIS — R11.2 NAUSEA WITH VOMITING, UNSPECIFIED: ICD-10-CM

## 2022-04-28 DIAGNOSIS — E86.0 DEHYDRATION: ICD-10-CM

## 2022-04-28 DIAGNOSIS — C76.0 MALIGNANT NEOPLASM OF HEAD, FACE AND NECK: ICD-10-CM

## 2022-04-28 PROCEDURE — 77387B: CUSTOM | Mod: 26

## 2022-04-28 NOTE — VITALS
[Maximal Pain Intensity: 7/10] : 7/10 [Least Pain Intensity: 0/10] : 0/10 [Pain Description/Quality: ___] : Pain description/quality: [unfilled] [Pain Duration: ___] : Pain duration: [unfilled] [Pain Location: ___] : Pain Location: [unfilled] [Pain Interferes with ADLs] : Pain interferes with activities of daily living. [NoTreatment Scheduled] : no treatment scheduled [90: Able to carry normal activity; minor signs or symptoms of disease.] : 90: Able to carry normal activity; minor signs or symptoms of disease.

## 2022-04-28 NOTE — DISEASE MANAGEMENT
[Clinical] : TNM Stage: c [N/A] : Currently not applicable [FreeTextEntry4] : SCCa BOT, p16 pending [TTNM] : 4 [NTNM] : 1 [MTNM] : x [de-identified] : 200 [de-identified] : 2516 [de-identified] : head & neck

## 2022-04-28 NOTE — PHYSICAL EXAM
[Normal] : oriented to person, place and time, the affect was normal, the mood was normal and not anxious [Sclera] : the sclera and conjunctiva were normal [Extraocular Movements] : extraocular movements were intact [Outer Ear] : the ears and nose were normal in appearance [Normal Oral Cavity] : oral cavity was normal [Abdomen Soft] : soft [Nondistended] : nondistended [Abdomen Tenderness] : non-tender

## 2022-04-28 NOTE — REVIEW OF SYSTEMS
[Dysphagia: Grade 0] : Dysphagia: Grade 0 [Edema Limbs: Grade 0] : Edema Limbs: Grade 0  [Fatigue: Grade 1 - Fatigue relieved by rest] : Fatigue: Grade 1 - Fatigue relieved by rest [Localized Edema: Grade 0] : Localized Edema: Grade 0  [Neck Edema: Grade 0] : Neck Edema: Grade 0 [Tinnitus - Grade 0] : Tinnitus - Grade 0 [Mucositis Oral: Grade 0] : Mucositis Oral: Grade 0  [Oral Pain: Grade 0] : Oral Pain: Grade 0 [Dysgeusia: Grade 1- Altered taste but no change in diet] : Dysgeusia: Grade 1 - Altered taste but no change in diet [Skin Hyperpigmentation: Grade 0] : Skin Hyperpigmentation: Grade 0

## 2022-04-29 ENCOUNTER — OUTPATIENT (OUTPATIENT)
Dept: OUTPATIENT SERVICES | Facility: HOSPITAL | Age: 57
LOS: 1 days | Discharge: HOME | End: 2022-04-29

## 2022-04-29 DIAGNOSIS — C02.9 MALIGNANT NEOPLASM OF TONGUE, UNSPECIFIED: ICD-10-CM

## 2022-04-29 PROCEDURE — 77387B: CUSTOM | Mod: 26

## 2022-05-02 ENCOUNTER — RESULT REVIEW (OUTPATIENT)
Age: 57
End: 2022-05-02

## 2022-05-02 ENCOUNTER — APPOINTMENT (OUTPATIENT)
Dept: HEMATOLOGY ONCOLOGY | Facility: CLINIC | Age: 57
End: 2022-05-02

## 2022-05-02 LAB
BASOPHILS # BLD AUTO: 0.1 K/UL — SIGNIFICANT CHANGE UP (ref 0–0.2)
BASOPHILS NFR BLD AUTO: 0.9 % — SIGNIFICANT CHANGE UP (ref 0–2)
EOSINOPHIL # BLD AUTO: 1 K/UL — HIGH (ref 0–0.5)
EOSINOPHIL NFR BLD AUTO: 12.7 % — HIGH (ref 0–6)
HCT VFR BLD CALC: 46.8 % — SIGNIFICANT CHANGE UP (ref 39–50)
HGB BLD-MCNC: 15 G/DL — SIGNIFICANT CHANGE UP (ref 13–17)
LYMPHOCYTES # BLD AUTO: 1.6 K/UL — SIGNIFICANT CHANGE UP (ref 1–3.3)
LYMPHOCYTES # BLD AUTO: 20.8 % — SIGNIFICANT CHANGE UP (ref 13–44)
MCHC RBC-ENTMCNC: 32 G/DL — SIGNIFICANT CHANGE UP (ref 32–36)
MCHC RBC-ENTMCNC: 32 PG — SIGNIFICANT CHANGE UP (ref 27–34)
MCV RBC AUTO: 100.1 FL — HIGH (ref 80–100)
MONOCYTES # BLD AUTO: 0.7 K/UL — SIGNIFICANT CHANGE UP (ref 0–0.9)
MONOCYTES NFR BLD AUTO: 8.5 % — SIGNIFICANT CHANGE UP (ref 2–14)
NEUTROPHILS # BLD AUTO: 4.4 K/UL — SIGNIFICANT CHANGE UP (ref 1.8–7.4)
NEUTROPHILS NFR BLD AUTO: 57.1 % — SIGNIFICANT CHANGE UP (ref 43–77)
PLATELET # BLD AUTO: 238 K/UL — SIGNIFICANT CHANGE UP (ref 150–400)
RBC # BLD: 4.68 M/UL — SIGNIFICANT CHANGE UP (ref 4.2–5.8)
RBC # FLD: 12.3 % — SIGNIFICANT CHANGE UP (ref 10.3–14.5)
WBC # BLD: 7.8 K/UL — SIGNIFICANT CHANGE UP (ref 3.8–10.5)
WBC # FLD AUTO: 7.8 K/UL — SIGNIFICANT CHANGE UP (ref 3.8–10.5)

## 2022-05-02 PROCEDURE — 77387B: CUSTOM | Mod: 26

## 2022-05-03 LAB
ALBUMIN SERPL ELPH-MCNC: 4.7 G/DL
ALP BLD-CCNC: 64 U/L
ALT SERPL-CCNC: 30 U/L
ANION GAP SERPL CALC-SCNC: 14 MMOL/L
AST SERPL-CCNC: 21 U/L
BILIRUB SERPL-MCNC: 0.3 MG/DL
BUN SERPL-MCNC: 27 MG/DL
CALCIUM SERPL-MCNC: 9.6 MG/DL
CHLORIDE SERPL-SCNC: 97 MMOL/L
CO2 SERPL-SCNC: 28 MMOL/L
CREAT SERPL-MCNC: 0.61 MG/DL
EGFR: 111 ML/MIN/1.73M2
GLUCOSE SERPL-MCNC: 96 MG/DL
MAGNESIUM SERPL-MCNC: 2 MG/DL
POTASSIUM SERPL-SCNC: 4.1 MMOL/L
PROT SERPL-MCNC: 6.9 G/DL
SODIUM SERPL-SCNC: 139 MMOL/L

## 2022-05-03 PROCEDURE — 77387B: CUSTOM | Mod: 26

## 2022-05-04 ENCOUNTER — APPOINTMENT (OUTPATIENT)
Age: 57
End: 2022-05-04

## 2022-05-04 PROCEDURE — 77427 RADIATION TX MANAGEMENT X5: CPT

## 2022-05-04 PROCEDURE — 77014: CPT | Mod: 26

## 2022-05-05 ENCOUNTER — NON-APPOINTMENT (OUTPATIENT)
Age: 57
End: 2022-05-05

## 2022-05-05 ENCOUNTER — APPOINTMENT (OUTPATIENT)
Age: 57
End: 2022-05-05

## 2022-05-05 ENCOUNTER — APPOINTMENT (OUTPATIENT)
Dept: HEMATOLOGY ONCOLOGY | Facility: CLINIC | Age: 57
End: 2022-05-05
Payer: MEDICAID

## 2022-05-05 VITALS
OXYGEN SATURATION: 95 % | HEART RATE: 107 BPM | SYSTOLIC BLOOD PRESSURE: 157 MMHG | RESPIRATION RATE: 16 BRPM | WEIGHT: 170 LBS | DIASTOLIC BLOOD PRESSURE: 88 MMHG | BODY MASS INDEX: 25.1 KG/M2

## 2022-05-05 DIAGNOSIS — R11.2 NAUSEA WITH VOMITING, UNSPECIFIED: ICD-10-CM

## 2022-05-05 DIAGNOSIS — C76.0 MALIGNANT NEOPLASM OF HEAD, FACE AND NECK: ICD-10-CM

## 2022-05-05 DIAGNOSIS — Z51.11 ENCOUNTER FOR ANTINEOPLASTIC CHEMOTHERAPY: ICD-10-CM

## 2022-05-05 DIAGNOSIS — E86.0 DEHYDRATION: ICD-10-CM

## 2022-05-05 PROCEDURE — 77387B: CUSTOM | Mod: 26

## 2022-05-05 PROCEDURE — 99214 OFFICE O/P EST MOD 30 MIN: CPT

## 2022-05-05 NOTE — DISEASE MANAGEMENT
[Clinical] : TNM Stage: c [FreeTextEntry4] : SCCa BOT, p16/HPV16 positive [TTNM] : 4 [NTNM] : 1 [MTNM] : 0 [III] : III [de-identified] : 1200 [de-identified] : 6258 [de-identified] : head & neck

## 2022-05-05 NOTE — PHYSICAL EXAM
[Sclera] : the sclera and conjunctiva were normal [Extraocular Movements] : extraocular movements were intact [Outer Ear] : the ears and nose were normal in appearance [Normal Oral Cavity] : oral cavity was normal [Abdomen Soft] : soft [Nondistended] : nondistended [Abdomen Tenderness] : non-tender [Skin Lesions] : no skin lesions [Normal] : no focal deficits

## 2022-05-05 NOTE — REVIEW OF SYSTEMS
[Dysphagia: Grade 0] : Dysphagia: Grade 0 [Edema Limbs: Grade 0] : Edema Limbs: Grade 0  [Fatigue: Grade 1 - Fatigue relieved by rest] : Fatigue: Grade 1 - Fatigue relieved by rest [Localized Edema: Grade 0] : Localized Edema: Grade 0  [Neck Edema: Grade 0] : Neck Edema: Grade 0 [Tinnitus - Grade 0] : Tinnitus - Grade 0 [Mucositis Oral: Grade 0] : Mucositis Oral: Grade 0  [Oral Pain: Grade 0] : Oral Pain: Grade 0 [Dysgeusia: Grade 1- Altered taste but no change in diet] : Dysgeusia: Grade 1 - Altered taste but no change in diet [Skin Hyperpigmentation: Grade 0] : Skin Hyperpigmentation: Grade 0 [Dermatitis Radiation: Grade 0] : Dermatitis Radiation: Grade 0

## 2022-05-06 PROCEDURE — 77387B: CUSTOM | Mod: 26

## 2022-05-09 ENCOUNTER — RESULT REVIEW (OUTPATIENT)
Age: 57
End: 2022-05-09

## 2022-05-09 ENCOUNTER — APPOINTMENT (OUTPATIENT)
Dept: HEMATOLOGY ONCOLOGY | Facility: CLINIC | Age: 57
End: 2022-05-09

## 2022-05-09 LAB
BASOPHILS # BLD AUTO: 0.1 K/UL — SIGNIFICANT CHANGE UP (ref 0–0.2)
BASOPHILS NFR BLD AUTO: 0.6 % — SIGNIFICANT CHANGE UP (ref 0–2)
EOSINOPHIL # BLD AUTO: 0.2 K/UL — SIGNIFICANT CHANGE UP (ref 0–0.5)
EOSINOPHIL NFR BLD AUTO: 2.3 % — SIGNIFICANT CHANGE UP (ref 0–6)
HCT VFR BLD CALC: 44.6 % — SIGNIFICANT CHANGE UP (ref 39–50)
HGB BLD-MCNC: 15 G/DL — SIGNIFICANT CHANGE UP (ref 13–17)
LYMPHOCYTES # BLD AUTO: 0.8 K/UL — LOW (ref 1–3.3)
LYMPHOCYTES # BLD AUTO: 7.8 % — LOW (ref 13–44)
MCHC RBC-ENTMCNC: 32.5 PG — SIGNIFICANT CHANGE UP (ref 27–34)
MCHC RBC-ENTMCNC: 33.6 G/DL — SIGNIFICANT CHANGE UP (ref 32–36)
MCV RBC AUTO: 96.7 FL — SIGNIFICANT CHANGE UP (ref 80–100)
MONOCYTES # BLD AUTO: 0.7 K/UL — SIGNIFICANT CHANGE UP (ref 0–0.9)
MONOCYTES NFR BLD AUTO: 6 % — SIGNIFICANT CHANGE UP (ref 2–14)
NEUTROPHILS # BLD AUTO: 9.1 K/UL — HIGH (ref 1.8–7.4)
NEUTROPHILS NFR BLD AUTO: 83.4 % — HIGH (ref 43–77)
PLATELET # BLD AUTO: 243 K/UL — SIGNIFICANT CHANGE UP (ref 150–400)
RBC # BLD: 4.61 M/UL — SIGNIFICANT CHANGE UP (ref 4.2–5.8)
RBC # FLD: 11.6 % — SIGNIFICANT CHANGE UP (ref 10.3–14.5)
WBC # BLD: 10.9 K/UL — HIGH (ref 3.8–10.5)
WBC # FLD AUTO: 10.9 K/UL — HIGH (ref 3.8–10.5)

## 2022-05-09 PROCEDURE — 77387B: CUSTOM | Mod: 26

## 2022-05-10 LAB
ALBUMIN SERPL ELPH-MCNC: 4.6 G/DL
ALP BLD-CCNC: 76 U/L
ALT SERPL-CCNC: 27 U/L
ANION GAP SERPL CALC-SCNC: 13 MMOL/L
AST SERPL-CCNC: 21 U/L
BILIRUB SERPL-MCNC: 0.6 MG/DL
BUN SERPL-MCNC: 26 MG/DL
CALCIUM SERPL-MCNC: 9.7 MG/DL
CHLORIDE SERPL-SCNC: 95 MMOL/L
CO2 SERPL-SCNC: 28 MMOL/L
CREAT SERPL-MCNC: 0.73 MG/DL
EGFR: 105 ML/MIN/1.73M2
GLUCOSE SERPL-MCNC: 88 MG/DL
MAGNESIUM SERPL-MCNC: 1.9 MG/DL
POTASSIUM SERPL-SCNC: 4.6 MMOL/L
PROT SERPL-MCNC: 7 G/DL
SODIUM SERPL-SCNC: 136 MMOL/L

## 2022-05-10 PROCEDURE — 77387B: CUSTOM | Mod: 26

## 2022-05-11 ENCOUNTER — APPOINTMENT (OUTPATIENT)
Age: 57
End: 2022-05-11

## 2022-05-11 PROCEDURE — 77427 RADIATION TX MANAGEMENT X5: CPT

## 2022-05-11 PROCEDURE — 77014: CPT | Mod: 26

## 2022-05-11 NOTE — PHYSICAL EXAM
[Fully active, able to carry on all pre-disease performance without restriction] : Status 0 - Fully active, able to carry on all pre-disease performance without restriction [Thin] : thin [Normal] : affect appropriate [de-identified] : palpable cervical lymph node on the left

## 2022-05-11 NOTE — ASSESSMENT
[FreeTextEntry1] : 57 year old man with no significant past medical history who presents to the oncology clinic with locally advanced oropharyngeal cancer, cT4N\par \par # oropharyngeal cancer \par - today is C2D2 of CRT with weekly Cisplatin, tolerating well so far\par - s/p nutrition consult, CECILIO Gillian will follow \par - s/p gastroenterology consult for early referral in case PEG is needed in the future. \par - pre treatment staging PET-CT on 4/8/22:  FDG avid LEFT oropharyngeal/base of tongue mass, compatible with the given diagnosis of carcinoma. FDG avid metastatic LEFT level IIa cervical nodes. Mildly FDG avid subcentimeter RIGHT level IIa cervical node, nonspecific. FDG avid first molar RIGHT mandibular focus, likely dental in etiology. Prostatomegaly with FDG avid discrete midline focus. Suggest clinical correlation and urologic consult.\par - RTC Qday for RT, Qweek for treatment and Q7-14 days for MD/PA follow up\par \par #At initial evaluation on 4/1/22 Dr. De Guzman explained to the patient the natural history of head and neck cancer, and explained the role of chemotherapy as part of definitive treatment to be given with radiation. He also explained the risks and benefits of giving chemotherapy weekly or every 3 weeks. The patient was agreeable to treatment with cisplatin to be given weekly. Patient was consented for cisplatin

## 2022-05-12 ENCOUNTER — NON-APPOINTMENT (OUTPATIENT)
Age: 57
End: 2022-05-12

## 2022-05-12 ENCOUNTER — APPOINTMENT (OUTPATIENT)
Age: 57
End: 2022-05-12

## 2022-05-12 VITALS
BODY MASS INDEX: 24.07 KG/M2 | DIASTOLIC BLOOD PRESSURE: 71 MMHG | OXYGEN SATURATION: 97 % | WEIGHT: 163 LBS | SYSTOLIC BLOOD PRESSURE: 155 MMHG | HEART RATE: 78 BPM | RESPIRATION RATE: 16 BRPM

## 2022-05-12 PROCEDURE — 77387B: CUSTOM | Mod: 26

## 2022-05-12 NOTE — REVIEW OF SYSTEMS
[Edema Limbs: Grade 0] : Edema Limbs: Grade 0  [Fatigue: Grade 1 - Fatigue relieved by rest] : Fatigue: Grade 1 - Fatigue relieved by rest [Localized Edema: Grade 0] : Localized Edema: Grade 0  [Neck Edema: Grade 0] : Neck Edema: Grade 0 [Tinnitus - Grade 0] : Tinnitus - Grade 0 [Dysgeusia: Grade 1- Altered taste but no change in diet] : Dysgeusia: Grade 1 - Altered taste but no change in diet [Skin Hyperpigmentation: Grade 0] : Skin Hyperpigmentation: Grade 0 [Dermatitis Radiation: Grade 0] : Dermatitis Radiation: Grade 0 [Dysphagia: Grade 1 - Symptomatic, able to eat regular diet] : Dysphagia: Grade 1 - Symptomatic, able to eat regular diet [Mucositis Oral: Grade 1 - Asymptomatic or mild symptoms; intervention not indicated] : Mucositis Oral: Grade 1 - Asymptomatic or mild symptoms; intervention not indicated [Xerostomia: Grade 1 - Symptomatic (e.g., dry or thick saliva) without significant dietary alteration; unstimulated saliva flow >0.2 ml/min] : Xerostomia: Grade 1 - Symptomatic (e.g., dry or thick saliva) without significant dietary alteration; unstimulated saliva flow >0.2 ml/min [Oral Pain: Grade 1 - Mild pain] : Oral Pain: Grade 1 - Mild pain [Hoarseness: Grade 1 - Mild or intermittent voice change; fully understandable; self-resolves] : Hoarseness: Grade 1 - Mild or intermittent voice change; fully understandable; self-resolves

## 2022-05-12 NOTE — VITALS
[Maximal Pain Intensity: 7/10] : 7/10 [Pain Description/Quality: ___] : Pain description/quality: [unfilled] [Pain Duration: ___] : Pain duration: [unfilled] [Pain Location: ___] : Pain Location: [unfilled] [Pain Interferes with ADLs] : Pain interferes with activities of daily living. [Least Pain Intensity: 4/10] : 4/10 [Opioid] : opioid [80: Normal activity with effort; some signs or symptoms of disease.] : 80: Normal activity with effort; some signs or symptoms of disease.

## 2022-05-12 NOTE — DISEASE MANAGEMENT
[Clinical] : TNM Stage: c [III] : III [FreeTextEntry4] : SCCa BOT, p16/HPV16 positive [TTNM] : 4 [NTNM] : 1 [MTNM] : 0 [de-identified] : 4263 [de-identified] : 8509 [de-identified] : head & neck

## 2022-05-12 NOTE — PHYSICAL EXAM
[Sclera] : the sclera and conjunctiva were normal [Extraocular Movements] : extraocular movements were intact [Abdomen Soft] : soft [Nondistended] : nondistended [Abdomen Tenderness] : non-tender [Skin Lesions] : no skin lesions [Normal] : oriented to person, place and time, the affect was normal, the mood was normal and not anxious [de-identified] : mucositis of posterior oral tongue and oropharynx, mild xerostomia

## 2022-05-13 PROCEDURE — 77387B: CUSTOM | Mod: 26

## 2022-05-16 ENCOUNTER — RESULT REVIEW (OUTPATIENT)
Age: 57
End: 2022-05-16

## 2022-05-16 ENCOUNTER — APPOINTMENT (OUTPATIENT)
Dept: HEMATOLOGY ONCOLOGY | Facility: CLINIC | Age: 57
End: 2022-05-16
Payer: MEDICAID

## 2022-05-16 VITALS
BODY MASS INDEX: 23.4 KG/M2 | HEIGHT: 69 IN | DIASTOLIC BLOOD PRESSURE: 85 MMHG | HEART RATE: 90 BPM | SYSTOLIC BLOOD PRESSURE: 126 MMHG | WEIGHT: 158 LBS | OXYGEN SATURATION: 97 %

## 2022-05-16 LAB
ALBUMIN SERPL ELPH-MCNC: 4.8 G/DL
ALP BLD-CCNC: 77 U/L
ALT SERPL-CCNC: 19 U/L
ANION GAP SERPL CALC-SCNC: 12 MMOL/L
AST SERPL-CCNC: 18 U/L
BASOPHILS # BLD AUTO: 0.1 K/UL — SIGNIFICANT CHANGE UP (ref 0–0.2)
BASOPHILS NFR BLD AUTO: 1.4 % — SIGNIFICANT CHANGE UP (ref 0–2)
BILIRUB SERPL-MCNC: 0.4 MG/DL
BUN SERPL-MCNC: 27 MG/DL
CALCIUM SERPL-MCNC: 9.9 MG/DL
CHLORIDE SERPL-SCNC: 97 MMOL/L
CO2 SERPL-SCNC: 31 MMOL/L
CREAT SERPL-MCNC: 0.77 MG/DL
EGFR: 104 ML/MIN/1.73M2
EOSINOPHIL # BLD AUTO: 0.1 K/UL — SIGNIFICANT CHANGE UP (ref 0–0.5)
EOSINOPHIL NFR BLD AUTO: 1.4 % — SIGNIFICANT CHANGE UP (ref 0–6)
GLUCOSE SERPL-MCNC: 109 MG/DL
HCT VFR BLD CALC: 43.1 % — SIGNIFICANT CHANGE UP (ref 39–50)
HGB BLD-MCNC: 14.3 G/DL — SIGNIFICANT CHANGE UP (ref 13–17)
LYMPHOCYTES # BLD AUTO: 0.5 K/UL — LOW (ref 1–3.3)
LYMPHOCYTES # BLD AUTO: 6.3 % — LOW (ref 13–44)
MAGNESIUM SERPL-MCNC: 2 MG/DL
MCHC RBC-ENTMCNC: 32.3 PG — SIGNIFICANT CHANGE UP (ref 27–34)
MCHC RBC-ENTMCNC: 33.2 G/DL — SIGNIFICANT CHANGE UP (ref 32–36)
MCV RBC AUTO: 97.3 FL — SIGNIFICANT CHANGE UP (ref 80–100)
MONOCYTES # BLD AUTO: 0.5 K/UL — SIGNIFICANT CHANGE UP (ref 0–0.9)
MONOCYTES NFR BLD AUTO: 6.7 % — SIGNIFICANT CHANGE UP (ref 2–14)
NEUTROPHILS # BLD AUTO: 6.7 K/UL — SIGNIFICANT CHANGE UP (ref 1.8–7.4)
NEUTROPHILS NFR BLD AUTO: 84.2 % — HIGH (ref 43–77)
PLATELET # BLD AUTO: 210 K/UL — SIGNIFICANT CHANGE UP (ref 150–400)
POTASSIUM SERPL-SCNC: 4.7 MMOL/L
PROT SERPL-MCNC: 7.2 G/DL
RBC # BLD: 4.42 M/UL — SIGNIFICANT CHANGE UP (ref 4.2–5.8)
RBC # FLD: 11.7 % — SIGNIFICANT CHANGE UP (ref 10.3–14.5)
SODIUM SERPL-SCNC: 140 MMOL/L
WBC # BLD: 7.9 K/UL — SIGNIFICANT CHANGE UP (ref 3.8–10.5)
WBC # FLD AUTO: 7.9 K/UL — SIGNIFICANT CHANGE UP (ref 3.8–10.5)

## 2022-05-16 PROCEDURE — 99214 OFFICE O/P EST MOD 30 MIN: CPT

## 2022-05-16 PROCEDURE — 77387B: CUSTOM | Mod: 26

## 2022-05-17 ENCOUNTER — NON-APPOINTMENT (OUTPATIENT)
Age: 57
End: 2022-05-17

## 2022-05-17 PROCEDURE — 77387B: CUSTOM | Mod: 26

## 2022-05-17 RX ORDER — BENZOCAINE 3.6; 15 MG/1; MG/1
15-3.6 LOZENGE ORAL
Qty: 5 | Refills: 0 | Status: ACTIVE | COMMUNITY
Start: 2022-05-17 | End: 1900-01-01

## 2022-05-18 ENCOUNTER — INPATIENT (INPATIENT)
Facility: HOSPITAL | Age: 57
LOS: 3 days | Discharge: HOME | DRG: 640 | End: 2022-05-22
Attending: STUDENT IN AN ORGANIZED HEALTH CARE EDUCATION/TRAINING PROGRAM | Admitting: HOSPITALIST
Payer: MEDICAID

## 2022-05-18 ENCOUNTER — APPOINTMENT (OUTPATIENT)
Age: 57
End: 2022-05-18

## 2022-05-18 ENCOUNTER — APPOINTMENT (OUTPATIENT)
Dept: OTOLARYNGOLOGY | Facility: CLINIC | Age: 57
End: 2022-05-18

## 2022-05-18 VITALS
DIASTOLIC BLOOD PRESSURE: 85 MMHG | WEIGHT: 156.09 LBS | SYSTOLIC BLOOD PRESSURE: 148 MMHG | HEIGHT: 68 IN | OXYGEN SATURATION: 99 % | TEMPERATURE: 99 F | HEART RATE: 101 BPM | RESPIRATION RATE: 19 BRPM

## 2022-05-18 DIAGNOSIS — R62.7 ADULT FAILURE TO THRIVE: ICD-10-CM

## 2022-05-18 LAB
ALBUMIN SERPL ELPH-MCNC: 4.4 G/DL — SIGNIFICANT CHANGE UP (ref 3.3–5.2)
ALP SERPL-CCNC: 71 U/L — SIGNIFICANT CHANGE UP (ref 40–120)
ALT FLD-CCNC: 19 U/L — SIGNIFICANT CHANGE UP
ANION GAP SERPL CALC-SCNC: 12 MMOL/L — SIGNIFICANT CHANGE UP (ref 5–17)
AST SERPL-CCNC: 17 U/L — SIGNIFICANT CHANGE UP
BASOPHILS # BLD AUTO: 0 K/UL — SIGNIFICANT CHANGE UP (ref 0–0.2)
BASOPHILS NFR BLD AUTO: 0 % — SIGNIFICANT CHANGE UP (ref 0–2)
BILIRUB SERPL-MCNC: 0.7 MG/DL — SIGNIFICANT CHANGE UP (ref 0.4–2)
BLD GP AB SCN SERPL QL: SIGNIFICANT CHANGE UP
BUN SERPL-MCNC: 24.6 MG/DL — HIGH (ref 8–20)
CALCIUM SERPL-MCNC: 9.5 MG/DL — SIGNIFICANT CHANGE UP (ref 8.6–10.2)
CHLORIDE SERPL-SCNC: 93 MMOL/L — LOW (ref 98–107)
CO2 SERPL-SCNC: 29 MMOL/L — SIGNIFICANT CHANGE UP (ref 22–29)
CREAT SERPL-MCNC: 0.56 MG/DL — SIGNIFICANT CHANGE UP (ref 0.5–1.3)
EGFR: 114 ML/MIN/1.73M2 — SIGNIFICANT CHANGE UP
EOSINOPHIL # BLD AUTO: 0.11 K/UL — SIGNIFICANT CHANGE UP (ref 0–0.5)
EOSINOPHIL NFR BLD AUTO: 1.8 % — SIGNIFICANT CHANGE UP (ref 0–6)
GLUCOSE SERPL-MCNC: 97 MG/DL — SIGNIFICANT CHANGE UP (ref 70–99)
HCT VFR BLD CALC: 37.2 % — LOW (ref 39–50)
HGB BLD-MCNC: 12.7 G/DL — LOW (ref 13–17)
LYMPHOCYTES # BLD AUTO: 0.37 K/UL — LOW (ref 1–3.3)
LYMPHOCYTES # BLD AUTO: 6.1 % — LOW (ref 13–44)
MAGNESIUM SERPL-MCNC: 2 MG/DL — SIGNIFICANT CHANGE UP (ref 1.6–2.6)
MANUAL SMEAR VERIFICATION: SIGNIFICANT CHANGE UP
MCHC RBC-ENTMCNC: 32.1 PG — SIGNIFICANT CHANGE UP (ref 27–34)
MCHC RBC-ENTMCNC: 34.1 GM/DL — SIGNIFICANT CHANGE UP (ref 32–36)
MCV RBC AUTO: 93.9 FL — SIGNIFICANT CHANGE UP (ref 80–100)
MONOCYTES # BLD AUTO: 0.43 K/UL — SIGNIFICANT CHANGE UP (ref 0–0.9)
MONOCYTES NFR BLD AUTO: 7 % — SIGNIFICANT CHANGE UP (ref 2–14)
NEUTROPHILS # BLD AUTO: 5.17 K/UL — SIGNIFICANT CHANGE UP (ref 1.8–7.4)
NEUTROPHILS NFR BLD AUTO: 85.1 % — HIGH (ref 43–77)
PHOSPHATE SERPL-MCNC: 3.3 MG/DL — SIGNIFICANT CHANGE UP (ref 2.4–4.7)
PLAT MORPH BLD: NORMAL — SIGNIFICANT CHANGE UP
PLATELET # BLD AUTO: 157 K/UL — SIGNIFICANT CHANGE UP (ref 150–400)
POTASSIUM SERPL-MCNC: 4.5 MMOL/L — SIGNIFICANT CHANGE UP (ref 3.5–5.3)
POTASSIUM SERPL-SCNC: 4.5 MMOL/L — SIGNIFICANT CHANGE UP (ref 3.5–5.3)
PROT SERPL-MCNC: 7.6 G/DL — SIGNIFICANT CHANGE UP (ref 6.6–8.7)
RBC # BLD: 3.96 M/UL — LOW (ref 4.2–5.8)
RBC # FLD: 12.2 % — SIGNIFICANT CHANGE UP (ref 10.3–14.5)
RBC BLD AUTO: NORMAL — SIGNIFICANT CHANGE UP
SARS-COV-2 RNA SPEC QL NAA+PROBE: SIGNIFICANT CHANGE UP
SODIUM SERPL-SCNC: 134 MMOL/L — LOW (ref 135–145)
WBC # BLD: 6.08 K/UL — SIGNIFICANT CHANGE UP (ref 3.8–10.5)
WBC # FLD AUTO: 6.08 K/UL — SIGNIFICANT CHANGE UP (ref 3.8–10.5)

## 2022-05-18 PROCEDURE — 99285 EMERGENCY DEPT VISIT HI MDM: CPT

## 2022-05-18 PROCEDURE — 99223 1ST HOSP IP/OBS HIGH 75: CPT

## 2022-05-18 PROCEDURE — 93010 ELECTROCARDIOGRAM REPORT: CPT

## 2022-05-18 RX ORDER — SODIUM CHLORIDE 9 MG/ML
1000 INJECTION INTRAMUSCULAR; INTRAVENOUS; SUBCUTANEOUS ONCE
Refills: 0 | Status: COMPLETED | OUTPATIENT
Start: 2022-05-18 | End: 2022-05-18

## 2022-05-18 RX ORDER — DIPHENHYDRAMINE HYDROCHLORIDE AND LIDOCAINE HYDROCHLORIDE AND ALUMINUM HYDROXIDE AND MAGNESIUM HYDRO
5 KIT
Refills: 0 | Status: DISCONTINUED | OUTPATIENT
Start: 2022-05-18 | End: 2022-05-22

## 2022-05-18 RX ORDER — SODIUM CHLORIDE 9 MG/ML
1000 INJECTION, SOLUTION INTRAVENOUS
Refills: 0 | Status: DISCONTINUED | OUTPATIENT
Start: 2022-05-18 | End: 2022-05-22

## 2022-05-18 RX ORDER — MONTELUKAST 4 MG/1
10 TABLET, CHEWABLE ORAL DAILY
Refills: 0 | Status: DISCONTINUED | OUTPATIENT
Start: 2022-05-18 | End: 2022-05-20

## 2022-05-18 RX ORDER — ONDANSETRON 8 MG/1
4 TABLET, FILM COATED ORAL EVERY 6 HOURS
Refills: 0 | Status: DISCONTINUED | OUTPATIENT
Start: 2022-05-18 | End: 2022-05-22

## 2022-05-18 RX ORDER — POLYETHYLENE GLYCOL 3350 17 G/17G
17 POWDER, FOR SOLUTION ORAL DAILY
Refills: 0 | Status: DISCONTINUED | OUTPATIENT
Start: 2022-05-18 | End: 2022-05-20

## 2022-05-18 RX ORDER — GABAPENTIN 400 MG/1
600 CAPSULE ORAL THREE TIMES A DAY
Refills: 0 | Status: DISCONTINUED | OUTPATIENT
Start: 2022-05-18 | End: 2022-05-20

## 2022-05-18 RX ORDER — FLUTICASONE PROPIONATE 50 MCG
1 SPRAY, SUSPENSION NASAL
Refills: 0 | Status: DISCONTINUED | OUTPATIENT
Start: 2022-05-18 | End: 2022-05-22

## 2022-05-18 RX ORDER — SENNA PLUS 8.6 MG/1
2 TABLET ORAL AT BEDTIME
Refills: 0 | Status: DISCONTINUED | OUTPATIENT
Start: 2022-05-18 | End: 2022-05-20

## 2022-05-18 RX ORDER — ENOXAPARIN SODIUM 100 MG/ML
40 INJECTION SUBCUTANEOUS ONCE
Refills: 0 | Status: COMPLETED | OUTPATIENT
Start: 2022-05-18 | End: 2022-05-18

## 2022-05-18 RX ORDER — OXYCODONE HYDROCHLORIDE 5 MG/1
5 TABLET ORAL EVERY 6 HOURS
Refills: 0 | Status: DISCONTINUED | OUTPATIENT
Start: 2022-05-18 | End: 2022-05-20

## 2022-05-18 RX ORDER — FENTANYL CITRATE 50 UG/ML
1 INJECTION INTRAVENOUS
Refills: 0 | Status: DISCONTINUED | OUTPATIENT
Start: 2022-05-18 | End: 2022-05-22

## 2022-05-18 RX ORDER — CEFAZOLIN SODIUM 1 G
1000 VIAL (EA) INJECTION ONCE
Refills: 0 | Status: COMPLETED | OUTPATIENT
Start: 2022-05-19 | End: 2022-05-19

## 2022-05-18 RX ADMIN — SODIUM CHLORIDE 1000 MILLILITER(S): 9 INJECTION INTRAMUSCULAR; INTRAVENOUS; SUBCUTANEOUS at 11:40

## 2022-05-18 RX ADMIN — OXYCODONE HYDROCHLORIDE 5 MILLIGRAM(S): 5 TABLET ORAL at 22:47

## 2022-05-18 RX ADMIN — SENNA PLUS 2 TABLET(S): 8.6 TABLET ORAL at 22:02

## 2022-05-18 RX ADMIN — ENOXAPARIN SODIUM 40 MILLIGRAM(S): 100 INJECTION SUBCUTANEOUS at 18:15

## 2022-05-18 RX ADMIN — GABAPENTIN 600 MILLIGRAM(S): 400 CAPSULE ORAL at 22:02

## 2022-05-18 RX ADMIN — SODIUM CHLORIDE 1000 MILLILITER(S): 9 INJECTION INTRAMUSCULAR; INTRAVENOUS; SUBCUTANEOUS at 13:51

## 2022-05-18 RX ADMIN — MONTELUKAST 10 MILLIGRAM(S): 4 TABLET, CHEWABLE ORAL at 14:52

## 2022-05-18 RX ADMIN — OXYCODONE HYDROCHLORIDE 5 MILLIGRAM(S): 5 TABLET ORAL at 22:02

## 2022-05-18 RX ADMIN — OXYCODONE HYDROCHLORIDE 5 MILLIGRAM(S): 5 TABLET ORAL at 14:48

## 2022-05-18 RX ADMIN — SODIUM CHLORIDE 75 MILLILITER(S): 9 INJECTION, SOLUTION INTRAVENOUS at 22:12

## 2022-05-18 NOTE — ED PROVIDER NOTE - CLINICAL SUMMARY MEDICAL DECISION MAKING FREE TEXT BOX
Patient with failure to thrive 2/2 oral ca and inability to tolerate PO- labs, fluids, and likely admission. Sindy Galindo DO

## 2022-05-18 NOTE — H&P ADULT - HISTORY OF PRESENT ILLNESS
56 yo Male with throat / tongue cancer on radiation therapy / chemo send by oncologist office due to failure to thrive and weight loss   Pt states taht has been having difficulty eating drinking due to pain in mouth and throat since started after radiation therapy , He is getting nausea as well,, reports 15 lbs weight loss over few weeks  Denies diarrhea , appetite is good , + constipation   no dizziness , no SOB , no CP , no dizziness

## 2022-05-18 NOTE — H&P ADULT - CONVERSATION DETAILS
had conversation with patient re his illness , goals he wants  to be treated and get better   He has hope with treatment of his cancer as per oncologist   Pt 's HCP his sister   his wishes to get full support , intubation resuscitation if needed     full code

## 2022-05-18 NOTE — ED ADULT NURSE NOTE - OBJECTIVE STATEMENT
Assumed care of the Pt AOx3 in no acute distress. Pt complaining of poor PO intake Hx of throat cancer. Pt is having some throat discomfort when swallowing. Pt was sent by MD for weight loss for peg tube placement Pt VSS, breathing even and unlabored no complaints for RN at this time labs sent as per orders, meds given as per orders pt educated on plan of care, pt able to successfully teach back plan of care to RN, RN will continue to reeducate pt during hospital stay.

## 2022-05-18 NOTE — H&P ADULT - NSHPPHYSICALEXAM_GEN_ALL_CORE
Vital Signs Last 24 Hrs  T(C): 37.1 (18 May 2022 09:28), Max: 37.1 (18 May 2022 09:28)  T(F): 98.7 (18 May 2022 09:28), Max: 98.7 (18 May 2022 09:28)  HR: 101 (18 May 2022 09:28) (101 - 101)  BP: 148/85 (18 May 2022 09:28) (148/85 - 148/85)  BP(mean): --  RR: 19 (18 May 2022 09:28) (19 - 19)  SpO2: 99% (18 May 2022 09:28) (99% - 99%)

## 2022-05-18 NOTE — ED PROVIDER NOTE - PROGRESS NOTE DETAILS
D/w Dr. Grace (oncology)- plan to admit patient for PEG. D/w GI who will see patient. Sindy Galindo DO

## 2022-05-18 NOTE — H&P ADULT - NSHPLABSRESULTS_GEN_ALL_CORE
12.7   6.08  )-----------( 157      ( 18 May 2022 11:36 )             37.2   05-18    134<L>  |  93<L>  |  24.6<H>  ----------------------------<  97  4.5   |  29.0  |  0.56    Ca    9.5      18 May 2022 11:36  Phos  3.3     05-18  Mg     2.0     05-18    TPro  7.6  /  Alb  4.4  /  TBili  0.7  /  DBili  x   /  AST  17  /  ALT  19  /  AlkPhos  71  05-18

## 2022-05-18 NOTE — CONSULT NOTE ADULT - SUBJECTIVE AND OBJECTIVE BOX
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  As time has progressed he is having more difficulty eating or drinking and has had a 15 pound weight loss during the past 2 weeks as he is barely able to get anything down.  His last radiation therapy was yesterday and his last chemotherapy was 1 week ago.  He was due for repeat chemotherapy today.  He was sent in by his oncologist for percutaneous endoscopic gastrostomy tube placement.  He denies any abdominal pain.  His appetite is good.  He has some recent constipation but had a normal bowel movement this morning.  There is no rectal bleeding or melena.  There is no fever.  He denies any heartburn. There is apparently no surgical intervention anticipated at this time.          REVIEW OF SYSTEMS:    CONSTITUTIONAL: No fever, weight loss, or fatigue  EYES: No eye pain, visual disturbances, or discharge  ENMT:  No difficulty hearing, tinnitus, vertigo; No sinus or throat pain  NECK: No pain or stiffness  RESPIRATORY: No cough, wheezing, chills or hemoptysis; No shortness of breath  CARDIOVASCULAR: No chest pain, palpitations, dizziness, or leg swelling  GASTROINTESTINAL: as above  NEUROLOGICAL: No headaches, memory loss, loss of strength, numbness, or tremors  SKIN: No itching, burning, rashes, or lesions   LYMPH NODES: No enlarged glands  MUSCULOSKELETAL: No joint pain or swelling; No muscle, back, or extremity pain  PSYCHIATRIC: No depression, anxiety, mood swings, or difficulty sleeping  HEME/LYMPH: No easy bruising, or bleeding gums  ALLERY AND IMMUNOLOGIC: No hives or eczema      PAST MEDICAL & SURGICAL HISTORY:  Anxiety      Asthma  hx of hospitalization 20 years ago.  presesntly Well controlled with present medication regimen      Neoplasm of base of tongue          FAMILY HISTORY:  FH: heart disease (Father)        SOCIAL HISTORY:  Smoking Status: [ ] Current, [ ] Former, [ ] Never  Pack Years:  Alcohol Use:    Home Medications:  FIRST Mouthwash BLM mucous membrane suspension: mucous membrane 4 times a day, As Needed (12 May 2022 15:54)  Flonase 50 mcg/inh nasal spray: 1 spray(s) nasal once a day (12 May 2022 15:54)  gabapentin 600 mg oral tablet: 1 tab(s) orally 3 times a day (12 May 2022 15:54)  oxyCODONE 5 mg oral tablet: Take 1-2 tablets every 4-6 hours as needed for pain (12 May 2022 15:54)  Singulair 10 mg oral tablet: 1 tab(s) orally once a day PM (12 May 2022 15:54)  Wixela Inhub 250 mcg-50 mcg inhalation powder: 1 puff(s) inhaled once a day (12 May 2022 15:54)      MEDICATIONS:  MEDICATIONS  (STANDING):  sodium chloride 0.9% Bolus 1000 milliLiter(s) IV Bolus once    MEDICATIONS  (PRN):      Allergies    No Known Allergies    Intolerances        Vital Signs Last 24 Hrs  T(C): 37.1 (18 May 2022 09:28), Max: 37.1 (18 May 2022 09:28)  T(F): 98.7 (18 May 2022 09:28), Max: 98.7 (18 May 2022 09:28)  HR: 101 (18 May 2022 09:28) (101 - 101)  BP: 148/85 (18 May 2022 09:28) (148/85 - 148/85)  BP(mean): --  RR: 19 (18 May 2022 09:28) (19 - 19)  SpO2: 99% (18 May 2022 09:28) (99% - 99%)        PHYSICAL EXAM:    General: thin male in no acute distress  HEENT: MMM, conjunctiva and sclera clear  Lungs: Clear  Heart: Rhythm Regular, No Murmurs  Gastrointestinal: Soft, non-tender non-distended; Normal bowel sounds; No rebound or guarding; No Organomegaly & No Masses  Extremities: Normal range of motion, No clubbing, cyanosis or edema  Neurological: Alert and oriented x3, Non-focal  Skin: Warm and dry. 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  As time has progressed he is having more difficulty eating or drinking and has had a 15 pound weight loss during the past 2 weeks as he is barely able to get anything down.  His last radiation therapy was yesterday and his last chemotherapy was 1 week ago.  He was due for repeat chemotherapy today.  He was sent in by his oncologist for percutaneous endoscopic gastrostomy tube placement.  He denies any abdominal pain.  His appetite is good.  He has some recent constipation but had a normal bowel movement this morning.  There is no rectal bleeding or melena.  There is no fever.  He denies any heartburn. There is apparently no surgical intervention anticipated at this time.          REVIEW OF SYSTEMS:    CONSTITUTIONAL: No fever, weight loss, or fatigue  EYES: No eye pain, visual disturbances, or discharge  ENMT:  No difficulty hearing, tinnitus, vertigo; No sinus or throat pain  NECK: No pain or stiffness  RESPIRATORY: No cough, wheezing, chills or hemoptysis; No shortness of breath  CARDIOVASCULAR: No chest pain, palpitations, dizziness, or leg swelling  GASTROINTESTINAL: as above  NEUROLOGICAL: No headaches, memory loss, loss of strength, numbness, or tremors  SKIN: No itching, burning, rashes, or lesions   LYMPH NODES: No enlarged glands  MUSCULOSKELETAL: No joint pain or swelling; No muscle, back, or extremity pain  PSYCHIATRIC: No depression, anxiety, mood swings, or difficulty sleeping  HEME/LYMPH: No easy bruising, or bleeding gums  ALLERY AND IMMUNOLOGIC: No hives or eczema      PAST MEDICAL & SURGICAL HISTORY:  Anxiety      Asthma  hx of hospitalization 20 years ago.  presesntly Well controlled with present medication regimen      Neoplasm of base of tongue          FAMILY HISTORY:  FH: heart disease (Father)        SOCIAL HISTORY:  Smoking Status: [ ] Current, [ ] Former, [ ] Never  Pack Years:  Alcohol Use:    Home Medications:  FIRST Mouthwash BLM mucous membrane suspension: mucous membrane 4 times a day, As Needed (12 May 2022 15:54)  Flonase 50 mcg/inh nasal spray: 1 spray(s) nasal once a day (12 May 2022 15:54)  gabapentin 600 mg oral tablet: 1 tab(s) orally 3 times a day (12 May 2022 15:54)  oxyCODONE 5 mg oral tablet: Take 1-2 tablets every 4-6 hours as needed for pain (12 May 2022 15:54)  Singulair 10 mg oral tablet: 1 tab(s) orally once a day PM (12 May 2022 15:54)  Wixela Inhub 250 mcg-50 mcg inhalation powder: 1 puff(s) inhaled once a day (12 May 2022 15:54)      MEDICATIONS:  MEDICATIONS  (STANDING):  sodium chloride 0.9% Bolus 1000 milliLiter(s) IV Bolus once    MEDICATIONS  (PRN):      Allergies    No Known Allergies    Intolerances        Vital Signs Last 24 Hrs  T(C): 37.1 (18 May 2022 09:28), Max: 37.1 (18 May 2022 09:28)  T(F): 98.7 (18 May 2022 09:28), Max: 98.7 (18 May 2022 09:28)  HR: 101 (18 May 2022 09:28) (101 - 101)  BP: 148/85 (18 May 2022 09:28) (148/85 - 148/85)  BP(mean): --  RR: 19 (18 May 2022 09:28) (19 - 19)  SpO2: 99% (18 May 2022 09:28) (99% - 99%)        PHYSICAL EXAM:    General: thin male in no acute distress  HEENT: MMM, conjunctiva and sclera clear  Lungs: Clear  Heart: Rhythm Regular, No Murmurs  Gastrointestinal: Soft, non-tender non-distended; Normal bowel sounds; No rebound or guarding; No Organomegaly & No Masses  Extremities: Normal range of motion, No clubbing, cyanosis or edema  Neurological: Alert and oriented x3, Non-focal  Skin: Warm and dry. No obvious rash        LABS:                        12.7   6.08  )-----------( 157      ( 18 May 2022 11:36 )             37.2     05-18    134<L>  |  93<L>  |  24.6<H>  ----------------------------<  97  4.5   |  29.0  |  0.56    Ca    9.5      18 May 2022 11:36  Phos  3.3     05-18  Mg     2.0     05-18    TPro  7.6  /  Alb  4.4  /  TBili  0.7  /  DBili  x   /  AST  17  /  ALT  19  /  AlkPhos  71  05-18

## 2022-05-18 NOTE — ED PROVIDER NOTE - PHYSICAL EXAMINATION
Gen: NAD, AOx3  Head: NCAT  HEENT: throat erythematous with white discharge on soft palate,normal voice, no drooling  Lung: CTAB, no respiratory distress, no wheezing, rales, rhonchi  CV: normal s1/s2, rrr, no murmurs, Normal perfusion, pulses 2+ throughout  Abd: soft, NTND  MSK: No edema, no visible deformities, full range of motion in all 4 extremities  Neuro: CN II-XII grossly intact, No focal neurologic deficits  Skin: No rash   Psych: normal affect

## 2022-05-18 NOTE — ED ADULT NURSE NOTE - CHIEF COMPLAINT QUOTE
Pt sent in by [facility] for poor PO intake x 3-4 days.  Pt endorsing pain with swallowing that is hindering PO intake.  PMH throat and tongue CA on chemo and radiation; last treatment yesterday with radiation

## 2022-05-18 NOTE — PATIENT PROFILE ADULT - DATE OF FIRST COVID-19 BOOSTER
Shanita Omer is a 47year old male who presents for a complete physical exam.   HPI:   Pt complains of nothing new. The pain he experienced at his last visit seems to be much improved. Believes the BuSpar and fluoxetine remain helpful for his anxiety. Date   • Anxiety state, unspecified    • Obesity, unspecified    • Unspecified sleep apnea        PAST SURGICAL HISTORY:     Past Surgical History:   Procedure Laterality Date   • APPENDECTOMY  1998   • KNEE SCOPE/SURG/INCOND FX AID+FIXAT  1985    knee art clear to auscultation  CARDIO: RRR without murmur  VASCULAR:                       Pulse exam Right:  pedal 2+. Pulse exam Left:    pedal 2+.   Pretibial edema: No lymphadema in the legs or feet  GI: good BS's,no masses, HSM or tenderness  : two descend 15-Dec-2021

## 2022-05-18 NOTE — ED ADULT NURSE NOTE - NSIMPLEMENTINTERV_GEN_ALL_ED
Implemented All Universal Safety Interventions:  [place] to call system. Call bell, personal items and telephone within reach. Instruct patient to call for assistance. Room bathroom lighting operational. Non-slip footwear when patient is off stretcher. Physically safe environment: no spills, clutter or unnecessary equipment. Stretcher in lowest position, wheels locked, appropriate side rails in place.

## 2022-05-18 NOTE — ED ADULT TRIAGE NOTE - CHIEF COMPLAINT QUOTE
Pt sent in by [facility] for poor PO intake x 3-4 days.  Pt endorsing pain with swallowing that is hindering PO intake.  PMH throat and tongue CA on chemo and radiation; last treatment yesterday with radiation

## 2022-05-18 NOTE — CONSULT NOTE ADULT - ASSESSMENT
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  We will plan for percutaneous endoscopic gastrostomy tube placement in a.m. as long as the INR is less than 1.5.  Patient advised as to the procedure as well as the potential complications.  He should be n.p.o. after midnight, on no anticoagulants and IV hydration.

## 2022-05-18 NOTE — H&P ADULT - ASSESSMENT
58 yo M with Throat / tongue cancer on Chemo/ Radiation therapy with inability to eat drink due to pain , weight loss , needs feeding tube insertion     1- Failure to thrive / weight loss with throat cancer   GI consulted foe PEG insertion in am likely   cont Iv hydration   full liquid diet , ensure if tolerate   pain meds , antiemetic prn     2- Throat tongue cancer   to resume radiation therapy per oncology likely after discharge     3- Prerenal azotemia   mild   cont iv fluid     4- anxiety disorder on Gabapentin   cont     DVt prophylaxis   ambulate   OOB   lovenox 40 mg sq x1 today

## 2022-05-19 ENCOUNTER — APPOINTMENT (OUTPATIENT)
Age: 57
End: 2022-05-19

## 2022-05-19 ENCOUNTER — TRANSCRIPTION ENCOUNTER (OUTPATIENT)
Age: 57
End: 2022-05-19

## 2022-05-19 ENCOUNTER — NON-APPOINTMENT (OUTPATIENT)
Age: 57
End: 2022-05-19

## 2022-05-19 LAB
ANION GAP SERPL CALC-SCNC: 11 MMOL/L — SIGNIFICANT CHANGE UP (ref 5–17)
BUN SERPL-MCNC: 17 MG/DL — SIGNIFICANT CHANGE UP (ref 8–20)
CALCIUM SERPL-MCNC: 8.6 MG/DL — SIGNIFICANT CHANGE UP (ref 8.6–10.2)
CHLORIDE SERPL-SCNC: 99 MMOL/L — SIGNIFICANT CHANGE UP (ref 98–107)
CO2 SERPL-SCNC: 25 MMOL/L — SIGNIFICANT CHANGE UP (ref 22–29)
CREAT SERPL-MCNC: 0.54 MG/DL — SIGNIFICANT CHANGE UP (ref 0.5–1.3)
EGFR: 116 ML/MIN/1.73M2 — SIGNIFICANT CHANGE UP
GLUCOSE SERPL-MCNC: 79 MG/DL — SIGNIFICANT CHANGE UP (ref 70–99)
HCT VFR BLD CALC: 32.4 % — LOW (ref 39–50)
HCV AB S/CO SERPL IA: 0.13 S/CO — SIGNIFICANT CHANGE UP (ref 0–0.99)
HCV AB SERPL-IMP: SIGNIFICANT CHANGE UP
HGB BLD-MCNC: 11.1 G/DL — LOW (ref 13–17)
INR BLD: 1.27 RATIO — HIGH (ref 0.88–1.16)
MCHC RBC-ENTMCNC: 32.6 PG — SIGNIFICANT CHANGE UP (ref 27–34)
MCHC RBC-ENTMCNC: 34.3 GM/DL — SIGNIFICANT CHANGE UP (ref 32–36)
MCV RBC AUTO: 95 FL — SIGNIFICANT CHANGE UP (ref 80–100)
PLATELET # BLD AUTO: 146 K/UL — LOW (ref 150–400)
POTASSIUM SERPL-MCNC: 3.8 MMOL/L — SIGNIFICANT CHANGE UP (ref 3.5–5.3)
POTASSIUM SERPL-SCNC: 3.8 MMOL/L — SIGNIFICANT CHANGE UP (ref 3.5–5.3)
PROTHROM AB SERPL-ACNC: 14.8 SEC — HIGH (ref 10.5–13.4)
RBC # BLD: 3.41 M/UL — LOW (ref 4.2–5.8)
RBC # FLD: 12 % — SIGNIFICANT CHANGE UP (ref 10.3–14.5)
SODIUM SERPL-SCNC: 135 MMOL/L — SIGNIFICANT CHANGE UP (ref 135–145)
WBC # BLD: 3.92 K/UL — SIGNIFICANT CHANGE UP (ref 3.8–10.5)
WBC # FLD AUTO: 3.92 K/UL — SIGNIFICANT CHANGE UP (ref 3.8–10.5)

## 2022-05-19 PROCEDURE — 99232 SBSQ HOSP IP/OBS MODERATE 35: CPT

## 2022-05-19 PROCEDURE — 43246 EGD PLACE GASTROSTOMY TUBE: CPT

## 2022-05-19 DEVICE — TUBE GASTMY GASTRONOME 20FR 2/BX: Type: IMPLANTABLE DEVICE | Status: FUNCTIONAL

## 2022-05-19 RX ADMIN — Medication 1 SPRAY(S): at 05:59

## 2022-05-19 RX ADMIN — OXYCODONE HYDROCHLORIDE 5 MILLIGRAM(S): 5 TABLET ORAL at 14:45

## 2022-05-19 RX ADMIN — Medication 1 SPRAY(S): at 17:48

## 2022-05-19 RX ADMIN — OXYCODONE HYDROCHLORIDE 5 MILLIGRAM(S): 5 TABLET ORAL at 21:08

## 2022-05-19 RX ADMIN — SODIUM CHLORIDE 75 MILLILITER(S): 9 INJECTION, SOLUTION INTRAVENOUS at 20:59

## 2022-05-19 RX ADMIN — MONTELUKAST 10 MILLIGRAM(S): 4 TABLET, CHEWABLE ORAL at 13:44

## 2022-05-19 RX ADMIN — OXYCODONE HYDROCHLORIDE 5 MILLIGRAM(S): 5 TABLET ORAL at 21:58

## 2022-05-19 RX ADMIN — GABAPENTIN 600 MILLIGRAM(S): 400 CAPSULE ORAL at 05:59

## 2022-05-19 RX ADMIN — GABAPENTIN 600 MILLIGRAM(S): 400 CAPSULE ORAL at 13:47

## 2022-05-19 RX ADMIN — FENTANYL CITRATE 1 PATCH: 50 INJECTION INTRAVENOUS at 19:57

## 2022-05-19 RX ADMIN — FENTANYL CITRATE 1 PATCH: 50 INJECTION INTRAVENOUS at 17:47

## 2022-05-19 RX ADMIN — Medication 100 MILLIGRAM(S): at 08:49

## 2022-05-19 RX ADMIN — SENNA PLUS 2 TABLET(S): 8.6 TABLET ORAL at 21:09

## 2022-05-19 RX ADMIN — DIPHENHYDRAMINE HYDROCHLORIDE AND LIDOCAINE HYDROCHLORIDE AND ALUMINUM HYDROXIDE AND MAGNESIUM HYDRO 5 MILLILITER(S): KIT at 00:10

## 2022-05-19 RX ADMIN — OXYCODONE HYDROCHLORIDE 5 MILLIGRAM(S): 5 TABLET ORAL at 13:53

## 2022-05-19 RX ADMIN — GABAPENTIN 600 MILLIGRAM(S): 400 CAPSULE ORAL at 21:09

## 2022-05-19 NOTE — PROGRESS NOTE ADULT - ASSESSMENT
58 yo M with Throat / tongue cancer on Chemo/ Radiation therapy with inability to eat drink due to pain , weight loss , needs feeding tube insertion     Failure to thrive / weight loss with throat cancer   - GI consulted, s/p PEG today  - Nutrition eval  - cont Iv hydration   - full liquid diet , ensure if tolerate   - pain meds , antiemetic prn     Throat tongue cancer   - to resume radiation therapy per oncology likely after discharge  - Pain control    Prerenal azotemia   - Resolved     anxiety disorder on Gabapentin   - cont     DVt prophylaxis   ambulate   OOB      56 yo M with Throat / tongue cancer on Chemo/ Radiation therapy with inability to eat drink due to pain , weight loss , needs feeding tube insertion     Failure to thrive / weight loss with throat cancer   - GI consulted, s/p PEG today. Start feeds tomorrow AM  - Nutrition eval  - cont Iv hydration   - full liquid diet , ensure if tolerate   - pain meds , antiemetic prn     Throat tongue cancer   - to resume radiation therapy per oncology likely after discharge  - Pain control    Prerenal azotemia   - Resolved     anxiety disorder on Gabapentin   - cont     DVt prophylaxis   ambulate   OOB

## 2022-05-20 DIAGNOSIS — R13.10 DYSPHAGIA, UNSPECIFIED: ICD-10-CM

## 2022-05-20 PROCEDURE — 99233 SBSQ HOSP IP/OBS HIGH 50: CPT

## 2022-05-20 PROCEDURE — 99222 1ST HOSP IP/OBS MODERATE 55: CPT

## 2022-05-20 PROCEDURE — 99232 SBSQ HOSP IP/OBS MODERATE 35: CPT

## 2022-05-20 RX ORDER — MONTELUKAST 4 MG/1
10 TABLET, CHEWABLE ORAL ONCE
Refills: 0 | Status: COMPLETED | OUTPATIENT
Start: 2022-05-20 | End: 2022-05-20

## 2022-05-20 RX ORDER — MONTELUKAST 4 MG/1
10 TABLET, CHEWABLE ORAL DAILY
Refills: 0 | Status: DISCONTINUED | OUTPATIENT
Start: 2022-05-20 | End: 2022-05-22

## 2022-05-20 RX ORDER — OXYCODONE HYDROCHLORIDE 5 MG/1
10 TABLET ORAL
Refills: 0 | Status: DISCONTINUED | OUTPATIENT
Start: 2022-05-20 | End: 2022-05-22

## 2022-05-20 RX ORDER — OXYCODONE HYDROCHLORIDE 5 MG/1
5 TABLET ORAL
Refills: 0 | Status: DISCONTINUED | OUTPATIENT
Start: 2022-05-20 | End: 2022-05-22

## 2022-05-20 RX ORDER — OXYCODONE HYDROCHLORIDE 5 MG/1
10 TABLET ORAL
Refills: 0 | Status: DISCONTINUED | OUTPATIENT
Start: 2022-05-20 | End: 2022-05-20

## 2022-05-20 RX ORDER — KETOROLAC TROMETHAMINE 30 MG/ML
30 SYRINGE (ML) INJECTION EVERY 6 HOURS
Refills: 0 | Status: DISCONTINUED | OUTPATIENT
Start: 2022-05-20 | End: 2022-05-21

## 2022-05-20 RX ORDER — GABAPENTIN 400 MG/1
600 CAPSULE ORAL THREE TIMES A DAY
Refills: 0 | Status: DISCONTINUED | OUTPATIENT
Start: 2022-05-20 | End: 2022-05-22

## 2022-05-20 RX ORDER — SENNA PLUS 8.6 MG/1
2 TABLET ORAL AT BEDTIME
Refills: 0 | Status: DISCONTINUED | OUTPATIENT
Start: 2022-05-20 | End: 2022-05-22

## 2022-05-20 RX ORDER — OXYCODONE HYDROCHLORIDE 5 MG/1
5 TABLET ORAL
Refills: 0 | Status: DISCONTINUED | OUTPATIENT
Start: 2022-05-20 | End: 2022-05-20

## 2022-05-20 RX ORDER — POLYETHYLENE GLYCOL 3350 17 G/17G
17 POWDER, FOR SOLUTION ORAL DAILY
Refills: 0 | Status: DISCONTINUED | OUTPATIENT
Start: 2022-05-20 | End: 2022-05-22

## 2022-05-20 RX ORDER — KETOROLAC TROMETHAMINE 30 MG/ML
30 SYRINGE (ML) INJECTION ONCE
Refills: 0 | Status: DISCONTINUED | OUTPATIENT
Start: 2022-05-20 | End: 2022-05-20

## 2022-05-20 RX ADMIN — FENTANYL CITRATE 1 PATCH: 50 INJECTION INTRAVENOUS at 19:05

## 2022-05-20 RX ADMIN — Medication 30 MILLIGRAM(S): at 19:05

## 2022-05-20 RX ADMIN — OXYCODONE HYDROCHLORIDE 10 MILLIGRAM(S): 5 TABLET ORAL at 23:15

## 2022-05-20 RX ADMIN — MONTELUKAST 10 MILLIGRAM(S): 4 TABLET, CHEWABLE ORAL at 23:15

## 2022-05-20 RX ADMIN — Medication 30 MILLIGRAM(S): at 13:52

## 2022-05-20 RX ADMIN — OXYCODONE HYDROCHLORIDE 10 MILLIGRAM(S): 5 TABLET ORAL at 20:30

## 2022-05-20 RX ADMIN — Medication 1 SPRAY(S): at 05:54

## 2022-05-20 RX ADMIN — Medication 30 MILLIGRAM(S): at 06:54

## 2022-05-20 RX ADMIN — OXYCODONE HYDROCHLORIDE 10 MILLIGRAM(S): 5 TABLET ORAL at 11:24

## 2022-05-20 RX ADMIN — Medication 30 MILLIGRAM(S): at 23:16

## 2022-05-20 RX ADMIN — GABAPENTIN 600 MILLIGRAM(S): 400 CAPSULE ORAL at 21:54

## 2022-05-20 RX ADMIN — Medication 30 MILLIGRAM(S): at 18:04

## 2022-05-20 RX ADMIN — FENTANYL CITRATE 1 PATCH: 50 INJECTION INTRAVENOUS at 07:30

## 2022-05-20 RX ADMIN — OXYCODONE HYDROCHLORIDE 10 MILLIGRAM(S): 5 TABLET ORAL at 19:28

## 2022-05-20 RX ADMIN — SENNA PLUS 2 TABLET(S): 8.6 TABLET ORAL at 21:55

## 2022-05-20 RX ADMIN — Medication 30 MILLIGRAM(S): at 05:54

## 2022-05-20 RX ADMIN — Medication 1 SPRAY(S): at 18:04

## 2022-05-20 NOTE — DIETITIAN NUTRITION RISK NOTIFICATION - TREATMENT: THE FOLLOWING DIET HAS BEEN RECOMMENDED
Diet, NPO with Tube Feed:   Tube Feeding Modality: Gastrostomy  Jevity 1.5 Osvaldo (JEVITY1.5)  Total Volume for 24 Hours (mL): 960  Continuous  Starting Tube Feed Rate {mL per Hour}: 10  Increase Tube Feed Rate by (mL): 10     Every 4 hours  Until Goal Tube Feed Rate (mL per Hour): 40  Tube Feed Duration (in Hours): 24  Tube Feed Start Time: 10:00 (05-20-22 @ 09:35) [Active]  Diet, NPO after Midnight:      NPO Start Date: 18-May-2022,   NPO Start Time: 23:59 (05-18-22 @ 13:29) [Active]  Diet, NPO after Midnight:      NPO Start Date: 18-May-2022,   NPO Start Time: 23:59 (05-18-22 @ 12:53) [Active]

## 2022-05-20 NOTE — DIETITIAN INITIAL EVALUATION ADULT - ENTERAL
Jevity 1.5 @ 60ml/hr (x20 hours) 1200ml/day; 1800 kcal, 77gm protein (Continuous)   or   Jevity 1.5 bolus (240ml) 5x daily (1200ml/day) 1800kcal, 77gm protein

## 2022-05-20 NOTE — DIETITIAN INITIAL EVALUATION ADULT - ETIOLOGY
Related to inability to meet sufficient protein energy requirements in setting of Throat / tongue cancer on Chemo/ Radiation

## 2022-05-20 NOTE — PHYSICAL EXAM
[Fully active, able to carry on all pre-disease performance without restriction] : Status 0 - Fully active, able to carry on all pre-disease performance without restriction [Thin] : thin [Normal] : affect appropriate [de-identified] : palpable cervical lymph node on the left, improved

## 2022-05-20 NOTE — PROGRESS NOTE ADULT - PROBLEM SELECTOR PLAN 1
Secondary to throat / tongue cancer on Chemo/ Radiation therapy with inability to eat drink due to pain.  Peg tube placed on 5/19  Please start feeding  Nutritional evaluation  Continue pain management and PPI

## 2022-05-20 NOTE — CONSULT NOTE ADULT - ASSESSMENT
57M  throat CA  uncontrolled pain    pending    fentaNYL   Patch  25 MICROgram(s)/Hr 1 Patch Transdermal every 72 hours  gabapentin 600 milliGRAM(s) Oral three times a day    change oxycodone order to  oxycodone 5mg/10mg [id number] PRN mod/severe pain  - If no contraindication to NSAIDs, recommend starting ketorolac IV 30mg [id number] standing x 4 doses. 57M  throat CA  uncontrolled pain      gabapentin 600 milliGRAM(s) Oral three times a day    change oxycodone order to  oxycodone 5mg/10mg [id number] PRN mod/severe pain  fentaNYL   Patch  25 MICROgram(s)/Hr 1 Patch Transdermal every 72 hours  - If no contraindication to NSAIDs, recommend starting ketorolac IV 30mg [id number] standing x 4 doses. Afterwards, can use naproxen 500mg PO q12h x 7days, with food. HOLD for black/red stools.    if pain remains uncontrolled:   increase FP to 50mcg/hr q72h

## 2022-05-20 NOTE — PROGRESS NOTE ADULT - NS ATTEND AMEND GEN_ALL_CORE FT
57 M with tongue cancer , GI following for dysphagia , s/p PEG 5/19/2022, PEG site looks clean, started TF, tolerating well. GI will sign off.    labs/notes reviewed

## 2022-05-20 NOTE — CONSULT NOTE ADULT - SUBJECTIVE AND OBJECTIVE BOX
Patient is a 57y old  Male who presents with a chief complaint of inability to eat (19 May 2022 12:30)      HPI:  58 yo Male with throat / tongue cancer on radiation therapy / chemo send by oncologist office due to failure to thrive and weight loss   Pt states taht has been having difficulty eating drinking due to pain in mouth and throat since started after radiation therapy , He is getting nausea as well,, reports 15 lbs weight loss over few weeks  Denies diarrhea , appetite is good , + constipation   no dizziness , no SOB , no CP , no dizziness        (18 May 2022 12:59)            Analgesic Dosing for past 24 hours reviewed as below:  fentaNYL   Patch  25 MICROgram(s)/Hr   1 Patch Transdermal (05-19-22 @ 17:47)    gabapentin   600 milliGRAM(s) Oral (05-19-22 @ 21:09)   600 milliGRAM(s) Oral (05-19-22 @ 13:47)    ketorolac   Injectable   30 milliGRAM(s) IV Push (05-20-22 @ 05:54)    oxyCODONE    IR   5 milliGRAM(s) Oral (05-19-22 @ 21:08)   5 milliGRAM(s) Oral (05-19-22 @ 13:53)          T(C): 36.7 (05-20-22 @ 05:12), Max: 37.3 (05-19-22 @ 17:29)  HR: 81 (05-20-22 @ 05:12) (75 - 89)  BP: 128/73 (05-20-22 @ 05:12) (128/73 - 170/71)  RR: 18 (05-20-22 @ 05:12) (18 - 19)  SpO2: 93% (05-20-22 @ 05:12) (93% - 97%)        fentaNYL   Patch  25 MICROgram(s)/Hr 1 Patch Transdermal every 72 hours  FIRST- Mouthwash  BLM 5 milliLiter(s) Swish and Spit four times a day  fluticasone propionate 50 MICROgram(s)/spray Nasal Spray 1 Spray(s) Both Nostrils two times a day  gabapentin 600 milliGRAM(s) Oral three times a day  lactated ringers. 1000 milliLiter(s) IV Continuous <Continuous>  montelukast 10 milliGRAM(s) Oral daily  ondansetron Injectable 4 milliGRAM(s) IV Push every 6 hours PRN  oxyCODONE    IR 5 milliGRAM(s) Oral every 6 hours PRN  polyethylene glycol 3350 17 Gram(s) Oral daily PRN  senna 2 Tablet(s) Oral at bedtime                          11.1   3.92  )-----------( 146      ( 19 May 2022 06:41 )             32.4     05-19    135  |  99  |  17.0  ----------------------------<  79  3.8   |  25.0  |  0.54    Ca    8.6      19 May 2022 06:41  Phos  3.3     05-18  Mg     2.0     05-18    TPro  7.6  /  Alb  4.4  /  TBili  0.7  /  DBili  x   /  AST  17  /  ALT  19  /  AlkPhos  71  05-18    PT/INR - ( 19 May 2022 06:41 )   PT: 14.8 sec;   INR: 1.27 ratio               Pain Service   [phone] Patient is a 57y old  Male who presents with a chief complaint of inability to eat (20 May 2022 11:09)      HPI:  58 yo Male with throat / tongue cancer on radiation therapy / chemo send by oncologist office due to failure to thrive and weight loss   Pt states taht has been having difficulty eating drinking due to pain in mouth and throat since started after radiation therapy , He is getting nausea as well,, reports 15 lbs weight loss over few weeks  Denies diarrhea , appetite is good , + constipation   no dizziness , no SOB , no CP , no dizziness        (18 May 2022 12:59)      Interval Hx:  Patient seen during rounds  Patient reports pain to be mod controlled on current medications  unable to tolerate po without pain  s/p PEG yesterday  due to start TF today  good relief with toradol and FP  will rec increased dose oxy via PEG  Patient denies sedation with medications       Analgesic Dosing for past 24 hours reviewed as below:  fentaNYL   Patch  25 MICROgram(s)/Hr   1 Patch Transdermal (05-19-22 @ 17:47)    gabapentin   600 milliGRAM(s) Oral (05-19-22 @ 21:09)   600 milliGRAM(s) Oral (05-19-22 @ 13:47)    ketorolac   Injectable   30 milliGRAM(s) IV Push (05-20-22 @ 05:54)    oxyCODONE    IR   5 milliGRAM(s) Oral (05-19-22 @ 21:08)   5 milliGRAM(s) Oral (05-19-22 @ 13:53)    oxyCODONE    IR   10 milliGRAM(s) Oral (05-20-22 @ 11:24)          T(C): 36.7 (05-20-22 @ 05:12), Max: 37.3 (05-19-22 @ 17:29)  HR: 81 (05-20-22 @ 05:12) (81 - 89)  BP: 128/73 (05-20-22 @ 05:12) (128/73 - 170/71)  RR: 18 (05-20-22 @ 05:12) (18 - 19)  SpO2: 93% (05-20-22 @ 05:12) (93% - 97%)        fentaNYL   Patch  25 MICROgram(s)/Hr 1 Patch Transdermal every 72 hours  FIRST- Mouthwash  BLM 5 milliLiter(s) Swish and Spit four times a day  fluticasone propionate 50 MICROgram(s)/spray Nasal Spray 1 Spray(s) Both Nostrils two times a day  gabapentin 600 milliGRAM(s) Oral three times a day  ketorolac   Injectable 30 milliGRAM(s) IV Push every 6 hours  lactated ringers. 1000 milliLiter(s) IV Continuous <Continuous>  montelukast 10 milliGRAM(s) Oral daily  ondansetron Injectable 4 milliGRAM(s) IV Push every 6 hours PRN  oxyCODONE    IR 5 milliGRAM(s) Oral every 3 hours PRN  oxyCODONE    IR 10 milliGRAM(s) Oral every 3 hours PRN  polyethylene glycol 3350 17 Gram(s) Oral daily PRN  senna 2 Tablet(s) Oral at bedtime                          11.1   3.92  )-----------( 146      ( 19 May 2022 06:41 )             32.4     05-19    135  |  99  |  17.0  ----------------------------<  79  3.8   |  25.0  |  0.54    Ca    8.6      19 May 2022 06:41      PT/INR - ( 19 May 2022 06:41 )   PT: 14.8 sec;   INR: 1.27 ratio               Pain Service   [phone]

## 2022-05-20 NOTE — CONSULT NOTE ADULT - SUBJECTIVE AND OBJECTIVE BOX
REASON FOR CONSULTATION    HPI:  56 yo Male with throat / tongue cancer on radiation therapy / chemo who presented to the ED with poor PO intake and weight loss.      Pt states that has been having difficulty eating drinking due to pain in mouth and throat since started after radiation therapy , He is getting nausea as well,, reports 15 lbs weight loss over few weeks. He states that he is unable to tolerate both solids and liquids. He also states that he has significant dysphagia and odynophagia despite use of fentanyl, which limits his ability to eat. He is s/p PEG placement on 22.           REVIEW OF SYSTEMS:    REVIEW OF SYSTEMS:   [X All ROS addressed below are non-contributory, except:  Neuro: [ ] Headache [  ]Back pain [ ] Numbness [ ] Weakness [ ] Ataxia [ ] Dizziness [ ] Aphasia [ ] Dysarthria [ ] Visual disturbance  Resp: [ ] Shortness of breath/dyspnea, [ ] Orthopnea [ ] Cough  CV: [ ] Chest pain [ ] Palpitation [ ] Lightheadedness [ ] Syncope  Renal: [ ] Thirst [ ] Edema [ ] hematuria   GI: [ ] Nausea [ ] Emesis [ ] Abdominal pain [ ] Constipation [ ] Diarrhea  Hem: [ ] Hematemesis [ ] bright red blood per rectum  ID: [ ] Fever [ ] Chills [ ] Dysuria  ENT: [ ] Rhinorrhea  Neuro [ ] Numbness [ ] tingling   Psych: [ ] confusion     PAST MEDICAL & SURGICAL HISTORY:  Anxiety      Asthma  hx of hospitalization 20 years ago.  presesntly Well controlled with present medication regimen      Neoplasm of base of tongue          SOCIAL HISTORY:    FAMILY HISTORY:  FH: heart disease (Father)   at age 59        SOCIAL HISTORY:    Allergies    No Known Allergies    Intolerances        MEDICATIONS  (STANDING):  fentaNYL   Patch  25 MICROgram(s)/Hr 1 Patch Transdermal every 72 hours  FIRST- Mouthwash  BLM 5 milliLiter(s) Swish and Spit four times a day  fluticasone propionate 50 MICROgram(s)/spray Nasal Spray 1 Spray(s) Both Nostrils two times a day  gabapentin 600 milliGRAM(s) Oral three times a day  ketorolac   Injectable 30 milliGRAM(s) IV Push every 6 hours  lactated ringers. 1000 milliLiter(s) (75 mL/Hr) IV Continuous <Continuous>  montelukast 10 milliGRAM(s) Oral daily  senna 2 Tablet(s) Oral at bedtime    MEDICATIONS  (PRN):  ondansetron Injectable 4 milliGRAM(s) IV Push every 6 hours PRN Nausea and/or Vomiting  oxyCODONE    IR 5 milliGRAM(s) Oral every 3 hours PRN Moderate Pain (4 - 6)  oxyCODONE    IR 10 milliGRAM(s) Oral every 3 hours PRN Severe Pain (7 - 10)  polyethylene glycol 3350 17 Gram(s) Oral daily PRN Constipation      Vital Signs Last 24 Hrs  T(C): 37.3 (20 May 2022 16:58), Max: 37.3 (19 May 2022 17:29)  T(F): 99.1 (20 May 2022 16:58), Max: 99.2 (19 May 2022 17:29)  HR: 82 (20 May 2022 16:58) (81 - 89)  BP: 134/69 (20 May 2022 16:58) (128/73 - 170/71)  BP(mean): --  RR: 18 (20 May 2022 16:58) (18 - 19)  SpO2: 94% (20 May 2022 16:58) (93% - 97%)    PHYSICAL EXAM:    GENERAL: NAD, patient is tearful   HEAD:  Atraumatic, Normocephalic  EYES: EOMI, PERRLA, conjunctiva and sclera clear  ENMT: dry mucus membranes   NECK: Supple, No JVD, Normal thyroid  NERVOUS SYSTEM:  Alert & Oriented X3, Good concentration; Motor Strength 5/5 B/L upper and lower extremities; DTRs 2+ intact and symmetric  CHEST/LUNG: Clear to percussion bilaterally; No rales, rhonchi, wheezing, or rubs  HEART: Regular rate and rhythm; No murmurs, rubs, or gallops  ABDOMEN: s/p PEG placement.   EXTREMITIES:  2+ Peripheral Pulses, No clubbing, cyanosis, or edema  LYMPH: No lymphadenopathy noted  SKIN: No rashes or lesions      LABS:                        11.1   3.92  )-----------( 146      ( 19 May 2022 06:41 )             32.4     05-    135  |  99  |  17.0  ----------------------------<  79  3.8   |  25.0  |  0.54    Ca    8.6      19 May 2022 06:41      PT/INR - ( 19 May 2022 06:41 )   PT: 14.8 sec;   INR: 1.27 ratio

## 2022-05-20 NOTE — CONSULT NOTE ADULT - CONSULT REASON
dysphagia due to cancer at base of tongue undergoing radiation and chemo therapy
H+N cancer
Pain Management
BREN Garnett Addendum-------  Pt. has been evaluated by podiatry team; agree with plan set forth thus far; are recommending MRI of the left foot with contrast (apparently, outpatient study was a non-contrast study) to further evaluate.  Pt. agreeable with plan.  I am the CDU PA tonight; pt is good candidate for CDU as further eval / tx necessary; pt agrees with plan for CDU.

## 2022-05-20 NOTE — DIETITIAN INITIAL EVALUATION ADULT - NS FNS DIET ORDER
Diet, NPO with Tube Feed:   Tube Feeding Modality: Gastrostomy  Jevity 1.5 Osvaldo (JEVITY1.5)  Total Volume for 24 Hours (mL): 960  Continuous  Starting Tube Feed Rate {mL per Hour}: 10  Increase Tube Feed Rate by (mL): 10     Every 4 hours  Until Goal Tube Feed Rate (mL per Hour): 40  Tube Feed Duration (in Hours): 24  Tube Feed Start Time: 10:00 (05-20-22 @ 09:35)

## 2022-05-20 NOTE — HISTORY OF PRESENT ILLNESS
[Date: ____________] : Patient's last distress assessment performed on [unfilled]. 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 Erick Sultana, c/o headaches, facial pain and left ear pain.  CT maxillofacial sinus on 2/22/22 showed highly suspicious soft tissue fullness oropharynx left glossotonsilar sulcus, base of tongue/vallecula measuring 2.5 x 2.3 concerning for neoplasm. Suspicious left level 2A lymph node measure 1.6 cm.  Follow up CT neck on 3/4/22 showed soft tissue mass left oropharynx 28.5 x 23.9 x 34.3 mm with involvement of the intrinsic tongue muscles (T4).  2 unilateral pathologic left 2A lymph nodes with cystic necrosis concerning for tory metastasis (N1).\par \par He next saw ENT surgery, Dr. Renny Tena, where he was scheduled for EUA on 3/24/22 which stated "by palpation, the patient was found to have a broadly based tumor epicentered in his tongue base.  Anteriorly, it extended to the proximal tongue base circumvallate papilla region.  It extended laterally to, but not  across the glossotonsillar sulcus anteriorly and more posteriorly, laterally it extended to, but not across the glossopharyngeal sulcus.  The tumor seemed to extend just to the midline both in its anterior and posterior extent.  The tumor seemed to approach, but not cross into the vallecula."  Biopsy c/w non-keratinizing SCC.\par \par Staging PET pending for 4/8/22, which did not show evidence of metastatic disease. \par \par The patient was started on defintiive cRT with weekly cisplatin.  [de-identified] : The patient states that he is now no longer able to tolerate solid food due to dysphagia/odynophagia despite the use of pain medications. He endorses continued weight loss, and has lost 5 pounds in the last week, and about 15 pounds since the start of treatment. He is able to tolerate fluids without significant nausea and vomiting. He is trying his best to drink supplements and is only able to tolerate about two cans of ensure daily.

## 2022-05-20 NOTE — PROGRESS NOTE ADULT - ASSESSMENT
58 yo M with Throat / tongue cancer on Chemo/ Radiation therapy with inability to eat drink due to pain , weight loss , needs feeding tube insertion     Failure to thrive / weight loss with throat cancer   - GI consulted, s/p PEG yesterday, will start feeds this AM.  - Nutrition eval  - Continue with IVFs   - full liquid diet , ensure if tolerate   - pain meds, antiemetic prn     Throat tongue cancer   - to resume radiation therapy per oncology likely after discharge  - Pain control, PM consulted    Prerenal azotemia   - Resolved     Anxiety disorder  - on Gabapentin   - Pt requesting Psychiatry evaluation.     DVT ppx: ambulate, OOB  58 yo M with Throat / tongue cancer on Chemo/ Radiation therapy with inability to eat drink due to pain , weight loss , needs feeding tube insertion     Failure to thrive / weight loss with throat cancer   - GI consulted, s/p PEG yesterday, will start feeds this AM per Nutrition recommendations.  - Nutrition eval  - Continue with IVFs   - full liquid diet , ensure if tolerate   - pain meds, antiemetic prn     Throat tongue cancer   - to resume radiation therapy per oncology likely after discharge  - Pain regimen.  - Pain uncontrolled overnight. PM consulted.     Prerenal azotemia   - Resolved     Anxiety disorder  - on Gabapentin   - Pt requesting Psychiatry evaluation.     DVT ppx: ambulate, OOB

## 2022-05-20 NOTE — DIETITIAN INITIAL EVALUATION ADULT - PERTINENT LABORATORY DATA
05-19    135  |  99  |  17.0  ----------------------------<  79  3.8   |  25.0  |  0.54    Ca    8.6      19 May 2022 06:41  Phos  3.3     05-18  Mg     2.0     05-18    TPro  7.6  /  Alb  4.4  /  TBili  0.7  /  DBili  x   /  AST  17  /  ALT  19  /  AlkPhos  71  05-18

## 2022-05-20 NOTE — CONSULT NOTE ADULT - ASSESSMENT
57 year old man with locally advanced oropharyngeal cancer on treatment with chemotherapy and radiation, who presents to the ED with poor PO intake and weight loss. The patient is now s/p PEG placement     # Oropharyngeal cancer  - currently in week 3 of treatment. Will plan to skip chemotherapy this week given hospitalization. Patient is planned for discharge tomorrow and will follow up with RT on Monday to continue treatment. Next infusion of chemotherapy as scheduled.   - follow up in clinic within 2 weeks of discharge.

## 2022-05-20 NOTE — DIETITIAN INITIAL EVALUATION ADULT - OTHER INFO
Pt is a 56 yo Male with throat / tongue cancer on radiation therapy / chemo send by oncologist office due to failure to thrive and weight loss   Pt states that has been having difficulty eating drinking due to pain in mouth and throat since started after radiation therapy , He is getting nausea as well,, reports 15 lbs weight loss over few weeks. Pt admitted with Failure to thrive / weight loss with throat cancer   - GI consulted, s/p PEG today.

## 2022-05-20 NOTE — DIETITIAN INITIAL EVALUATION ADULT - PERTINENT MEDS FT
MEDICATIONS  (STANDING):  fentaNYL   Patch  25 MICROgram(s)/Hr 1 Patch Transdermal every 72 hours  FIRST- Mouthwash  BLM 5 milliLiter(s) Swish and Spit four times a day  fluticasone propionate 50 MICROgram(s)/spray Nasal Spray 1 Spray(s) Both Nostrils two times a day  gabapentin 600 milliGRAM(s) Oral three times a day  ketorolac   Injectable 30 milliGRAM(s) IV Push every 6 hours  lactated ringers. 1000 milliLiter(s) (75 mL/Hr) IV Continuous <Continuous>  montelukast 10 milliGRAM(s) Oral daily  senna 2 Tablet(s) Oral at bedtime    MEDICATIONS  (PRN):  ondansetron Injectable 4 milliGRAM(s) IV Push every 6 hours PRN Nausea and/or Vomiting  oxyCODONE    IR 5 milliGRAM(s) Oral every 3 hours PRN Moderate Pain (4 - 6)  oxyCODONE    IR 10 milliGRAM(s) Oral every 3 hours PRN Severe Pain (7 - 10)  polyethylene glycol 3350 17 Gram(s) Oral daily PRN Constipation

## 2022-05-20 NOTE — ASSESSMENT
[FreeTextEntry1] : 57 year old man with no significant past medical history who presents to the oncology clinic with locally advanced oropharyngeal cancer\par \par # oropharyngeal cancer \par - s/p nutrition consult, CECILIO French will follow \par - pre treatment staging PET-CT on 4/8/22:  FDG avid LEFT oropharyngeal/base of tongue mass, compatible with the given diagnosis of carcinoma. FDG avid metastatic LEFT level IIa cervical nodes. Mildly FDG avid subcentimeter RIGHT level IIa cervical node, nonspecific. FDG avid first molar RIGHT mandibular focus, likely dental in etiology. \par - RTC Qday for RT. \par - continue cisplatin at full dose for now. Follow up within two weeks. \par \par # prostamegaly \par - noted; follow up with urology after completion of treatment. \par \par # Weight loss\par - see above. \par - s/p gastroenterology consult; patient will reach out to GI doctor to discuss PEG placement as patient now with > 5% weight change in last month, is tolerating liquids, on active treatment, with tonsillar tumor. \par \par

## 2022-05-20 NOTE — REVIEW OF SYSTEMS
[Fatigue] : fatigue [Recent Change In Weight] : ~T recent weight change [Dysphagia] : dysphagia [Odynophagia] : odynophagia [Negative] : Heme/Lymph [Fever] : no fever [Chills] : no chills

## 2022-05-20 NOTE — DIETITIAN INITIAL EVALUATION ADULT - ORAL INTAKE PTA/DIET HISTORY
Pt reports being having extremely poor PO intake over past month since starting Radiation therapy. Pt endorses excruciating pain in mouth and throat, reports can barely swallow water. Pt reports 20lb weight loss in this time. Pt is now s/p PEG placement; enteral feed not yet initiated.

## 2022-05-21 PROCEDURE — 99233 SBSQ HOSP IP/OBS HIGH 50: CPT

## 2022-05-21 RX ORDER — KETOROLAC TROMETHAMINE 30 MG/ML
30 SYRINGE (ML) INJECTION ONCE
Refills: 0 | Status: DISCONTINUED | OUTPATIENT
Start: 2022-05-21 | End: 2022-05-21

## 2022-05-21 RX ADMIN — SENNA PLUS 2 TABLET(S): 8.6 TABLET ORAL at 21:28

## 2022-05-21 RX ADMIN — Medication 1 SPRAY(S): at 05:37

## 2022-05-21 RX ADMIN — Medication 30 MILLIGRAM(S): at 05:38

## 2022-05-21 RX ADMIN — Medication 30 MILLIGRAM(S): at 21:15

## 2022-05-21 RX ADMIN — Medication 30 MILLIGRAM(S): at 06:34

## 2022-05-21 RX ADMIN — OXYCODONE HYDROCHLORIDE 10 MILLIGRAM(S): 5 TABLET ORAL at 20:00

## 2022-05-21 RX ADMIN — OXYCODONE HYDROCHLORIDE 10 MILLIGRAM(S): 5 TABLET ORAL at 13:07

## 2022-05-21 RX ADMIN — Medication 30 MILLIGRAM(S): at 00:16

## 2022-05-21 RX ADMIN — Medication 30 MILLIGRAM(S): at 21:27

## 2022-05-21 RX ADMIN — FENTANYL CITRATE 1 PATCH: 50 INJECTION INTRAVENOUS at 07:30

## 2022-05-21 RX ADMIN — MONTELUKAST 10 MILLIGRAM(S): 4 TABLET, CHEWABLE ORAL at 13:07

## 2022-05-21 RX ADMIN — GABAPENTIN 600 MILLIGRAM(S): 400 CAPSULE ORAL at 13:06

## 2022-05-21 RX ADMIN — FENTANYL CITRATE 1 PATCH: 50 INJECTION INTRAVENOUS at 19:00

## 2022-05-21 RX ADMIN — OXYCODONE HYDROCHLORIDE 10 MILLIGRAM(S): 5 TABLET ORAL at 23:07

## 2022-05-21 RX ADMIN — OXYCODONE HYDROCHLORIDE 10 MILLIGRAM(S): 5 TABLET ORAL at 06:35

## 2022-05-21 RX ADMIN — OXYCODONE HYDROCHLORIDE 10 MILLIGRAM(S): 5 TABLET ORAL at 05:38

## 2022-05-21 RX ADMIN — GABAPENTIN 600 MILLIGRAM(S): 400 CAPSULE ORAL at 05:38

## 2022-05-21 RX ADMIN — OXYCODONE HYDROCHLORIDE 10 MILLIGRAM(S): 5 TABLET ORAL at 20:45

## 2022-05-21 RX ADMIN — OXYCODONE HYDROCHLORIDE 10 MILLIGRAM(S): 5 TABLET ORAL at 00:15

## 2022-05-21 RX ADMIN — GABAPENTIN 600 MILLIGRAM(S): 400 CAPSULE ORAL at 21:28

## 2022-05-21 RX ADMIN — Medication 1 SPRAY(S): at 17:10

## 2022-05-21 NOTE — PROGRESS NOTE ADULT - NUTRITIONAL ASSESSMENT
This patient has been assessed with a concern for Malnutrition and has been determined to have a diagnosis/diagnoses of Severe protein-calorie malnutrition.    This patient is being managed with:   Diet NPO with Tube Feed-  Tube Feeding Modality: Gastrostomy  Jevity 1.5 Osvaldo (JEVITY1.5)  Total Volume for 24 Hours (mL): 960  Continuous  Starting Tube Feed Rate {mL per Hour}: 10  Increase Tube Feed Rate by (mL): 10     Every 4 hours  Until Goal Tube Feed Rate (mL per Hour): 40  Tube Feed Duration (in Hours): 24  Tube Feed Start Time: 10:00  Entered: May 20 2022  9:35AM    Diet NPO after Midnight-     NPO Start Date: 18-May-2022   NPO Start Time: 23:59  Entered: May 18 2022  1:28PM    Diet NPO after Midnight-     NPO Start Date: 18-May-2022   NPO Start Time: 23:59  Entered: May 18 2022 12:51PM    
This patient has been assessed with a concern for Malnutrition and has been determined to have a diagnosis/diagnoses of Severe protein-calorie malnutrition.    This patient is being managed with:   Diet NPO with Tube Feed-  Tube Feeding Modality: Gastrostomy  Jevity 1.5 Osvaldo (JEVITY1.5)  Total Volume for 24 Hours (mL): 960  Continuous  Starting Tube Feed Rate {mL per Hour}: 10  Increase Tube Feed Rate by (mL): 10     Every 4 hours  Until Goal Tube Feed Rate (mL per Hour): 40  Tube Feed Duration (in Hours): 24  Tube Feed Start Time: 10:00  Entered: May 20 2022  9:35AM

## 2022-05-21 NOTE — CHART NOTE - NSCHARTNOTEFT_GEN_A_CORE
Discussed with attending hospitalist for bolus recommendations. Recommend the following regimen, initiate at half volume to assess tolerance and provide remaining amount as feasible.     Recommend Jevity 1.5cal bolus (240ml) 5x daily (1200ml/day) 1800kcal, 77gm protein, 912mL fluid. Additional free fluid per MD discretion  Rx MVI daily   Monitor weights daily for trend/accuracy

## 2022-05-21 NOTE — PROGRESS NOTE ADULT - SUBJECTIVE AND OBJECTIVE BOX
[facility] Division of Hospital Medicine  Harpal Paez MD    Chief Complaint:  Patient is a 57y old  Male who presents with a chief complaint of inability to eat (20 May 2022 17:23)      SUBJECTIVE / OVERNIGHT EVENTS:  Patient seen and examined at bedside. No acute events reported overnight. No new complaints.    Patient denies chest pain, SOB, abd pain, N/V, fever, chills, dysuria or any other complaints. All remainder ROS negative.     MEDICATIONS  (STANDING):  fentaNYL   Patch  25 MICROgram(s)/Hr 1 Patch Transdermal every 72 hours  FIRST- Mouthwash  BLM 5 milliLiter(s) Swish and Spit four times a day  fluticasone propionate 50 MICROgram(s)/spray Nasal Spray 1 Spray(s) Both Nostrils two times a day  gabapentin 600 milliGRAM(s) Oral three times a day  lactated ringers. 1000 milliLiter(s) (75 mL/Hr) IV Continuous <Continuous>  montelukast 10 milliGRAM(s) Oral daily  senna 2 Tablet(s) Oral at bedtime    MEDICATIONS  (PRN):  ondansetron Injectable 4 milliGRAM(s) IV Push every 6 hours PRN Nausea and/or Vomiting  oxyCODONE    IR 5 milliGRAM(s) Oral every 3 hours PRN Moderate Pain (4 - 6)  oxyCODONE    IR 10 milliGRAM(s) Oral every 3 hours PRN Severe Pain (7 - 10)  polyethylene glycol 3350 17 Gram(s) Oral daily PRN Constipation        I&O's Summary    20 May 2022 07:01  -  21 May 2022 07:00  --------------------------------------------------------  IN: 740 mL / OUT: 0 mL / NET: 740 mL        PHYSICAL EXAM:  Vital Signs Last 24 Hrs  T(C): 37.2 (21 May 2022 05:30), Max: 37.3 (20 May 2022 16:58)  T(F): 98.9 (21 May 2022 05:30), Max: 99.1 (20 May 2022 16:58)  HR: 86 (21 May 2022 05:30) (78 - 87)  BP: 145/70 (21 May 2022 05:30) (134/69 - 145/70)  BP(mean): --  RR: 18 (21 May 2022 05:30) (17 - 18)  SpO2: 92% (21 May 2022 05:30) (92% - 94%)        CONSTITUTIONAL: NAD  HEENT: NC/AT, PERRL, no JVD  RESPIRATORY: CTA bilaterally, normal effort  CARDIOVASCULAR: RRR, S1/S2+, no m/g/r  ABDOMEN: +PEG  MUSCULOSKELETAL: No edema, cyanosis or deformities.  PSYCH: Calm, affect appropriate.  NEUROLOGY: Awake, alert, no focal neurological deficits.   SKIN: No rashes; no palpable lesions  VASC: Distal pulses palpable    LABS:                    CAPILLARY BLOOD GLUCOSE            RADIOLOGY & ADDITIONAL TESTS:  Results Reviewed:   Imaging Personally Reviewed:  Electrocardiogram Personally Reviewed:                                          
Patient is a 57y old  Male who presents with a chief complaint of inability to eat (20 May 2022 10:20)      INTERVAL HPI/OVERNIGHT EVENTS: Patient seen and evaluated at bedside, reporting no complaints, no overnight events, Peg tube in placed, not tolerating liquid diet. Denies nausea, vomiting, chest pain, shortness of breath, hematemesis, hematochezia, melena.                                                           .      MEDICATIONS  (STANDING):  fentaNYL   Patch  25 MICROgram(s)/Hr 1 Patch Transdermal every 72 hours  FIRST- Mouthwash  BLM 5 milliLiter(s) Swish and Spit four times a day  fluticasone propionate 50 MICROgram(s)/spray Nasal Spray 1 Spray(s) Both Nostrils two times a day  gabapentin 600 milliGRAM(s) Oral three times a day  ketorolac   Injectable 30 milliGRAM(s) IV Push every 6 hours  lactated ringers. 1000 milliLiter(s) (75 mL/Hr) IV Continuous <Continuous>  montelukast 10 milliGRAM(s) Oral daily  senna 2 Tablet(s) Oral at bedtime    MEDICATIONS  (PRN):  ondansetron Injectable 4 milliGRAM(s) IV Push every 6 hours PRN Nausea and/or Vomiting  oxyCODONE    IR 5 milliGRAM(s) Oral every 3 hours PRN Moderate Pain (4 - 6)  oxyCODONE    IR 10 milliGRAM(s) Oral every 3 hours PRN Severe Pain (7 - 10)  polyethylene glycol 3350 17 Gram(s) Oral daily PRN Constipation      Allergies    No Known Allergies    Intolerances    Review of Systems:  · ENMT: negative  · Respiratory and Thorax: negative  · Cardiovascular: negative  · Gastrointestinal: see above.  · Genitourinary:	negative  · Musculoskeletal: negative  · Neurological: negative  · Psychiatric: negative  · Hematology/Lymphatics: negative  · Endocrine: negative        Vital Signs Last 24 Hrs  T(C): 36.7 (20 May 2022 05:12), Max: 37.3 (19 May 2022 17:29)  T(F): 98 (20 May 2022 05:12), Max: 99.2 (19 May 2022 17:29)  HR: 81 (20 May 2022 05:12) (81 - 89)  BP: 128/73 (20 May 2022 05:12) (128/73 - 170/71)  BP(mean): --  RR: 18 (20 May 2022 05:12) (18 - 19)  SpO2: 93% (20 May 2022 05:12) (93% - 97%)    PHYSICAL EXAM:  · Constitutional: Sitting comfortably man, in no acute distress.   · Eyes: EOMI; PERRL; no drainage or redness  · ENMT: No oral lesions; no gross abnormalities  · Neck:	No bruits; no thyromegaly or nodules  · Back:	No deformity or limitation of movement  · Respiratory: Breath Sounds equal & clear to percussion & auscultation, no accessory muscle use  · Cardiovascular: Regular rate & rhythm, normal S1, S2; no murmurs, gallops or rubs; no S3, S4  · Gastrointestinal: Soft, non-tender, no hepatosplenomegaly, normal bowel sounds. Peg tube in place.       LABS:                        11.1   3.92  )-----------( 146      ( 19 May 2022 06:41 )             32.4     05-    135  |  99  |  17.0  ----------------------------<  79  3.8   |  25.0  |  0.54    Ca    8.6      19 May 2022 06:41  Phos  3.3     05-18  Mg     2.0     05-18    TPro  7.6  /  Alb  4.4  /  TBili  0.7  /  DBili  x   /  AST  17  /  ALT  19  /  AlkPhos  71  05-18    PT/INR - ( 19 May 2022 06:41 )   PT: 14.8 sec;   INR: 1.27 ratio             LIVER FUNCTIONS - ( 18 May 2022 11:36 )  Alb: 4.4 g/dL / Pro: 7.6 g/dL / ALK PHOS: 71 U/L / ALT: 19 U/L / AST: 17 U/L / GGT: x             RADIOLOGY & ADDITIONAL TESTS:  < from: EGD w/ PEG Placement (22 @ 08:36) >  PEG Placement Report        Date: 2022 8:36 AM        Patient Name: MARILYN VINCENT        MRN: [id number]        Account Number:        [id number]        Gender: Male         (age): 1965 (57)        Instrument(s):        Olympus: [id number])        Attending/Fellow:        Addy Hess MD                Procedure Room #:        +        PROCEDURE ROOM 2                        Administered Medications:        As per Anesthesiology Record        Indications:        Dysphagia, oropharyngeal phase: 787.22 - R13.12        PEG Placement:        Consent: The procedure, indications, preparation and potential complications    were explained to the patient, who indicated understanding and signed the    corresponding consent forms.        Percutaneous endoscopic gastrostomy: In a darkened room, the abdominal wall was    transilluminated and a site was selected. Indentation of the gastric wall by    external finger pressure was demonstrated. The skin was surgically prepared and    anesthetized with xylocaine. A small incision was made in the abdominal wall    using a surgical blade. A 25-gauge needle with cannula was inserted through the    abdominal wall and into the gastric lumen. A guidewire was passed through the    cannula, was caught by the snare passed through the endoscope and brought out    through the mouth. A Salezeo 20 Fr PEG tube was secured to the    guidewire and pulled through the abdominal wall. A satisfactory final position    was confirmed endoscopically. The gastrostomy tube was secured with the outer    flange positioned at 2 cm. The post procedure appearances were satisfactory.        Limitations/Complications:        There were no apparent limitations or complications        Findings:        Esophagus Mucosa Normal mucosa was noted in the whole esophagus.        Stomach Mucosa Normal mucosa was noted in the whole stomach.        Duodenum Mucosa Normal mucosa was noted in the whole duodenum.        Impressions:        Normal mucosa inthe whole esophagus.        Normal mucosa in the whole stomach.        Normal mucosa in the whole examined duodenum.        Plan:        Keep NPO for now        Additional Notes:        can give meds per G tube and can start G tube feeds in AM. FlushG tube with    60ml water after administration of meds                Addy Hess MD        Version 1, Electronically signed     < end of copied text >  
Patient seen and evaluated at bedside during rounds with attending today. He reports mild improvement in pain since PEG placement "giving throat a break" and moderate control with use of oxycodone appx every 6h though continued pain swallowing. He would prefer to rely on FP with less oral medication usage. Denies new weakness, chest pain, fever, headache, chills.    Vital Signs Last 24 Hrs  T(C): 37.1 (21 May 2022 20:42), Max: 37.2 (21 May 2022 05:30)  T(F): 98.8 (21 May 2022 20:42), Max: 98.9 (21 May 2022 05:30)  HR: 85 (21 May 2022 20:42) (81 - 89)  BP: 127/70 (21 May 2022 20:42) (127/70 - 145/70)  BP(mean): 89 (21 May 2022 20:42) (89 - 89)  RR: 19 (21 May 2022 20:42) (18 - 19)  SpO2: 92% (21 May 2022 20:42) (91% - 95%)  AOx3, sitting up in bed, NAD  Speaking in full sentences, non labored breathing  MCDOWELL x4 spontaneously    MEDICATIONS  (STANDING):  fentaNYL   Patch  25 MICROgram(s)/Hr 1 Patch Transdermal every 72 hours  FIRST- Mouthwash  BLM 5 milliLiter(s) Swish and Spit four times a day  fluticasone propionate 50 MICROgram(s)/spray Nasal Spray 1 Spray(s) Both Nostrils two times a day  gabapentin 600 milliGRAM(s) Oral three times a day  ketorolac   Injectable 30 milliGRAM(s) IV Push once  lactated ringers. 1000 milliLiter(s) (75 mL/Hr) IV Continuous <Continuous>  montelukast 10 milliGRAM(s) Oral daily  multivitamin 1 Tablet(s) Oral daily  senna 2 Tablet(s) Oral at bedtime    MEDICATIONS  (PRN):  ondansetron Injectable 4 milliGRAM(s) IV Push every 6 hours PRN Nausea and/or Vomiting  oxyCODONE    IR 5 milliGRAM(s) Oral every 3 hours PRN Moderate Pain (4 - 6)  oxyCODONE    IR 10 milliGRAM(s) Oral every 3 hours PRN Severe Pain (7 - 10)  polyethylene glycol 3350 17 Gram(s) Oral daily PRN Constipation  
[facility] Division of Hospital Medicine  Harpal Paez MD    Chief Complaint:  Patient is a 57y old  Male who presents with a chief complaint of inability to eat (18 May 2022 12:59)      SUBJECTIVE / OVERNIGHT EVENTS:  Patient seen and examined at bedside. No acute events reported overnight. No new complaints.    Patient denies chest pain, SOB, abd pain, N/V, fever, chills, dysuria or any other complaints. All remainder ROS negative.     MEDICATIONS  (STANDING):  fentaNYL   Patch  25 MICROgram(s)/Hr 1 Patch Transdermal every 72 hours  FIRST- Mouthwash  BLM 5 milliLiter(s) Swish and Spit four times a day  fluticasone propionate 50 MICROgram(s)/spray Nasal Spray 1 Spray(s) Both Nostrils two times a day  gabapentin 600 milliGRAM(s) Oral three times a day  lactated ringers. 1000 milliLiter(s) (75 mL/Hr) IV Continuous <Continuous>  montelukast 10 milliGRAM(s) Oral daily  senna 2 Tablet(s) Oral at bedtime    MEDICATIONS  (PRN):  ondansetron Injectable 4 milliGRAM(s) IV Push every 6 hours PRN Nausea and/or Vomiting  oxyCODONE    IR 5 milliGRAM(s) Oral every 6 hours PRN Severe Pain (7 - 10)  polyethylene glycol 3350 17 Gram(s) Oral daily PRN Constipation        I&O's Summary      PHYSICAL EXAM:  Vital Signs Last 24 Hrs  T(C): 36.2 (19 May 2022 10:46), Max: 37.3 (18 May 2022 20:06)  T(F): 97.1 (19 May 2022 10:46), Max: 99.2 (18 May 2022 20:06)  HR: 75 (19 May 2022 10:46) (69 - 84)  BP: 147/71 (19 May 2022 10:46) (125/69 - 152/72)  BP(mean): --  RR: 18 (19 May 2022 10:46) (18 - 18)  SpO2: 97% (19 May 2022 10:46) (96% - 99%)        CONSTITUTIONAL: NAD  HEENT: NC/AT, PERRL, no JVD  RESPIRATORY: CTA bilaterally, normal effort  CARDIOVASCULAR: RRR, S1/S2+, no m/g/r  ABDOMEN: S/p PEG  MUSCULOSKELETAL: No edema, cyanosis or deformities.  PSYCH: Calm, affect appropriate.  NEUROLOGY: Awake, alert, no focal neurological deficits.   SKIN: No rashes; no palpable lesions  VASC: Distal pulses palpable    LABS:                        11.1   3.92  )-----------( 146      ( 19 May 2022 06:41 )             32.4     05-19    135  |  99  |  17.0  ----------------------------<  79  3.8   |  25.0  |  0.54    Ca    8.6      19 May 2022 06:41  Phos  3.3     05-18  Mg     2.0     05-18    TPro  7.6  /  Alb  4.4  /  TBili  0.7  /  DBili  x   /  AST  17  /  ALT  19  /  AlkPhos  71  05-18    PT/INR - ( 19 May 2022 06:41 )   PT: 14.8 sec;   INR: 1.27 ratio                   CAPILLARY BLOOD GLUCOSE            RADIOLOGY & ADDITIONAL TESTS:  Results Reviewed:   Imaging Personally Reviewed:  Electrocardiogram Personally Reviewed:                                          
[facility] Division of Hospital Medicine  Harpal Paez MD    Chief Complaint:  Patient is a 57y old  Male who presents with a chief complaint of Failure to thrive in adult     (20 May 2022 09:44)      SUBJECTIVE / OVERNIGHT EVENTS:  Patient seen and examined at bedside. Complaining of pain on eating.    Patient denies chest pain, SOB, abd pain, N/V, fever, chills, dysuria or any other complaints. All remainder ROS negative.     MEDICATIONS  (STANDING):  fentaNYL   Patch  25 MICROgram(s)/Hr 1 Patch Transdermal every 72 hours  FIRST- Mouthwash  BLM 5 milliLiter(s) Swish and Spit four times a day  fluticasone propionate 50 MICROgram(s)/spray Nasal Spray 1 Spray(s) Both Nostrils two times a day  gabapentin 600 milliGRAM(s) Oral three times a day  ketorolac   Injectable 30 milliGRAM(s) IV Push every 6 hours  lactated ringers. 1000 milliLiter(s) (75 mL/Hr) IV Continuous <Continuous>  montelukast 10 milliGRAM(s) Oral daily  senna 2 Tablet(s) Oral at bedtime    MEDICATIONS  (PRN):  ondansetron Injectable 4 milliGRAM(s) IV Push every 6 hours PRN Nausea and/or Vomiting  oxyCODONE    IR 5 milliGRAM(s) Oral every 3 hours PRN Moderate Pain (4 - 6)  oxyCODONE    IR 10 milliGRAM(s) Oral every 3 hours PRN Severe Pain (7 - 10)  polyethylene glycol 3350 17 Gram(s) Oral daily PRN Constipation        I&O's Summary      PHYSICAL EXAM:  Vital Signs Last 24 Hrs  T(C): 36.7 (20 May 2022 05:12), Max: 37.3 (19 May 2022 17:29)  T(F): 98 (20 May 2022 05:12), Max: 99.2 (19 May 2022 17:29)  HR: 81 (20 May 2022 05:12) (75 - 89)  BP: 128/73 (20 May 2022 05:12) (128/73 - 170/71)  BP(mean): --  RR: 18 (20 May 2022 05:12) (18 - 19)  SpO2: 93% (20 May 2022 05:12) (93% - 97%)    CONSTITUTIONAL: NAD  HEENT: NC/AT, PERRL, no JVD  RESPIRATORY: CTA bilaterally, normal effort  CARDIOVASCULAR: RRR, S1/S2+, no m/g/r  ABDOMEN: +PEG.   MUSCULOSKELETAL: No edema, cyanosis or deformities.  PSYCH: Calm, affect appropriate.  NEUROLOGY: Awake, alert, no focal neurological deficits.   SKIN: No rashes; no palpable lesions  VASC: Distal pulses palpable    LABS:                        11.1   3.92  )-----------( 146      ( 19 May 2022 06:41 )             32.4     05-19    135  |  99  |  17.0  ----------------------------<  79  3.8   |  25.0  |  0.54    Ca    8.6      19 May 2022 06:41  Phos  3.3     05-18  Mg     2.0     05-18    TPro  7.6  /  Alb  4.4  /  TBili  0.7  /  DBili  x   /  AST  17  /  ALT  19  /  AlkPhos  71  05-18    PT/INR - ( 19 May 2022 06:41 )   PT: 14.8 sec;   INR: 1.27 ratio                   CAPILLARY BLOOD GLUCOSE            RADIOLOGY & ADDITIONAL TESTS:  Results Reviewed:   Imaging Personally Reviewed:  Electrocardiogram Personally Reviewed:

## 2022-05-21 NOTE — PROGRESS NOTE ADULT - ASSESSMENT
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 Now s/p PEG placement.   -Consider increasing FP 50mcg/h q72h  -Decrease oxycodone 5/10mg q4h prn mod/sev pain via PEG  -Continue adjuncts  -Case discussed with Dr. Bob

## 2022-05-21 NOTE — PROGRESS NOTE ADULT - ASSESSMENT
56 yo M with Throat / tongue cancer on Chemo/ Radiation therapy with inability to eat drink due to pain , weight loss , needs feeding tube insertion     Failure to thrive / weight loss with throat cancer   - GI consulted, s/p PEG.  - Nutrition eval appreciated  - full liquid diet , ensure if tolerate   - pain meds, antiemetic prn  - Started on tube feeds, tolerating.      Throat tongue cancer   - to resume radiation therapy per oncology likely after discharge  - Pain control, PM consult appreciated.    Prerenal azotemia   - Resolved     Anxiety disorder  - on Gabapentin   - Pt requesting Psychiatry evaluation.     DVT ppx: ambulate, OOB  56 yo M with Throat / tongue cancer on Chemo/ Radiation therapy with inability to eat drink due to pain , weight loss , needs feeding tube insertion     Failure to thrive / weight loss with throat cancer   - GI consulted, s/p PEG.  - Nutrition eval appreciated  - full liquid diet , ensure if tolerate   - pain meds, antiemetic prn  - Started on tube feeds per Nutrition recommendations. Will assess if tolerating.     Throat tongue cancer   - to resume radiation therapy per oncology likely after discharge  - Pain control.  - Pain uncontrolled overnight. PM consulted, recommendations appreciated.    Prerenal azotemia   - Resolved     Anxiety disorder  - on Gabapentin   - Pt requesting Psychiatry evaluation for depressed mood and anxiety. Denying HI/SI.      DVT ppx: ambulate, OOB  58 yo M with Throat / tongue cancer on Chemo/ Radiation therapy with inability to eat drink due to pain , weight loss , needs feeding tube insertion     Failure to thrive / weight loss with throat cancer   - GI consulted, s/p PEG.  - Nutrition eval appreciated  - full liquid diet , ensure if tolerate   - pain meds, antiemetic prn  - Started on tube feeds per Nutrition recommendations. Will assess if tolerating.     Throat tongue cancer   - to resume radiation therapy per oncology likely after discharge  - Pain control.  - Pain uncontrolled priopr. PM consulted, recommendations appreciated. Now controlled.    Prerenal azotemia   - Resolved     Anxiety disorder  - on Gabapentin   - Pt requesting Psychiatry evaluation for depressed mood and anxiety. Denying HI/SI.      DVT ppx: ambulate, OOB

## 2022-05-22 ENCOUNTER — TRANSCRIPTION ENCOUNTER (OUTPATIENT)
Age: 57
End: 2022-05-22

## 2022-05-22 VITALS
SYSTOLIC BLOOD PRESSURE: 128 MMHG | OXYGEN SATURATION: 95 % | DIASTOLIC BLOOD PRESSURE: 62 MMHG | HEART RATE: 93 BPM | RESPIRATION RATE: 18 BRPM | TEMPERATURE: 98 F

## 2022-05-22 LAB — GLUCOSE BLDC GLUCOMTR-MCNC: 133 MG/DL — HIGH (ref 70–99)

## 2022-05-22 PROCEDURE — 99239 HOSP IP/OBS DSCHRG MGMT >30: CPT

## 2022-05-22 RX ADMIN — GABAPENTIN 600 MILLIGRAM(S): 400 CAPSULE ORAL at 05:53

## 2022-05-22 RX ADMIN — OXYCODONE HYDROCHLORIDE 10 MILLIGRAM(S): 5 TABLET ORAL at 11:25

## 2022-05-22 RX ADMIN — FENTANYL CITRATE 1 PATCH: 50 INJECTION INTRAVENOUS at 15:18

## 2022-05-22 RX ADMIN — Medication 1 TABLET(S): at 11:25

## 2022-05-22 RX ADMIN — MONTELUKAST 10 MILLIGRAM(S): 4 TABLET, CHEWABLE ORAL at 11:25

## 2022-05-22 RX ADMIN — OXYCODONE HYDROCHLORIDE 10 MILLIGRAM(S): 5 TABLET ORAL at 12:25

## 2022-05-22 RX ADMIN — OXYCODONE HYDROCHLORIDE 10 MILLIGRAM(S): 5 TABLET ORAL at 06:46

## 2022-05-22 RX ADMIN — OXYCODONE HYDROCHLORIDE 10 MILLIGRAM(S): 5 TABLET ORAL at 00:10

## 2022-05-22 RX ADMIN — Medication 1 SPRAY(S): at 05:46

## 2022-05-22 RX ADMIN — OXYCODONE HYDROCHLORIDE 10 MILLIGRAM(S): 5 TABLET ORAL at 05:46

## 2022-05-22 NOTE — DISCHARGE NOTE PROVIDER - CARE PROVIDER_API CALL
PCP,   Phone: (   )    -  Fax: (   )    -  Follow Up Time: 1 week    Dr. De Guzman (Oncology),   Phone: (   )    -  Fax: (   )    -  Follow Up Time: 2 weeks

## 2022-05-22 NOTE — DISCHARGE NOTE NURSING/CASE MANAGEMENT/SOCIAL WORK - NSDCPEFALRISK_GEN_ALL_CORE
For information on Fall & Injury Prevention, visit: [email or web address] OR  [email or web address] OR  https://www.cdc.gov/steadi/patient.html

## 2022-05-22 NOTE — DISCHARGE NOTE NURSING/CASE MANAGEMENT/SOCIAL WORK - PATIENT PORTAL LINK FT
You can access the FollowMyHealth Patient Portal offered by [facility] by registering at the following website: [email or web address]. By joining [email or web address] FollowMyHealth portal, you will also be able to view your health information using other applications (apps) compatible with our system. no

## 2022-05-22 NOTE — DISCHARGE NOTE PROVIDER - NSDCMRMEDTOKEN_GEN_ALL_CORE_FT
.: Jevity 1.5. 300mL bolus every 6 hours for 1 hour. Free water flush 200 mL every 6 hours.  FIRST Mouthwash BLM mucous membrane suspension: mucous membrane 4 times a day, As Needed  Flonase 50 mcg/inh nasal spray: 1 spray(s) nasal once a day  gabapentin 600 mg oral tablet: 1 tab(s) orally 3 times a day  Multiple Vitamins oral tablet: 1 tab(s) orally once a day  oxyCODONE 5 mg oral tablet: 1  orally every 6 hours, As Needed -2 tablets every 4-6 hours as needed for pain  Singulair 10 mg oral tablet: 1 tab(s) orally once a day PM  Wixela Inhub 250 mcg-50 mcg inhalation powder: 1 puff(s) inhaled once a day

## 2022-05-22 NOTE — DISCHARGE NOTE PROVIDER - NSDCFUADDINST_GEN_ALL_CORE_FT
Recommend Jevity 1.5cal bolus (300ml) 4x daily (1200ml/day) for 1 hour each. 300ml water flush with boluses.

## 2022-05-22 NOTE — DISCHARGE NOTE PROVIDER - PROVIDER TOKENS
FREE:[LAST:[PCP],PHONE:[(   )    -],FAX:[(   )    -],FOLLOWUP:[1 week]],FREE:[LAST:[Dr. De Guzman (Oncology)],PHONE:[(   )    -],FAX:[(   )    -],FOLLOWUP:[2 weeks]]

## 2022-05-22 NOTE — DISCHARGE NOTE PROVIDER - DETAILS OF MALNUTRITION DIAGNOSIS/DIAGNOSES
This patient has been assessed with a concern for Malnutrition and was treated during this hospitalization for the following Nutrition diagnosis/diagnoses:     -  05/20/2022: Severe protein-calorie malnutrition

## 2022-05-22 NOTE — DISCHARGE NOTE PROVIDER - HOSPITAL COURSE
58 yo M with Throat / tongue cancer on Chemo/ Radiation therapy who presented with FTT due to oral pain. GI was consulted for PEG tube placement which was placed successfully. Nutrition was consulted and patient tolerated bolus feeding. Patient seen by Oncology, to follow up outpatient. Patient also seen by Pain Medicine and pain was better controlled. Patient noted to have prerenal azotema, which improved with hydration. Patient no longer requires inpatient hospitalization and is stable for discharge w/ outpatient follow up.

## 2022-05-22 NOTE — DISCHARGE NOTE PROVIDER - ATTENDING DISCHARGE PHYSICAL EXAMINATION:
Vital Signs Last 24 Hrs  T(F): 98.8 (22 May 2022 05:22), Max: 98.8 (21 May 2022 20:42)  HR: 89 (22 May 2022 05:22) (81 - 89)  BP: 126/73 (22 May 2022 05:22) (126/73 - 138/67)  RR: 18 (22 May 2022 05:22) (18 - 19)  SpO2: 96% (22 May 2022 05:22) (91% - 96%)    Physical Exam:  Constitutional: NAD  HEENT: NC/AT, PERRL, EOMI, trachea midline, no JVD  Respiratory: CTA bilaterally, symmetrical chest rise  Cardiovascular: RRR, no m/g/r  Gastrointestinal: +PEG  Vascular: 2+ peripheral pulses  Neurological: A/O x 3, no focal neurological deficits  Psych: Fair mood/affect  Musculoskeletal: No edema, cyanosis, deformities. ROM normal  Skin: No obvious rash, lesions. No jaundice.

## 2022-05-22 NOTE — DISCHARGE NOTE PROVIDER - NSDCCPCAREPLAN_GEN_ALL_CORE_FT
PRINCIPAL DISCHARGE DIAGNOSIS  Diagnosis: Adult failure to thrive  Assessment and Plan of Treatment: S/p PEG. Started on bolus feeding which was tolerated. Follow up with your PCP and Oncology.      SECONDARY DISCHARGE DIAGNOSES  Diagnosis: Oral cancer  Assessment and Plan of Treatment:

## 2022-05-23 ENCOUNTER — APPOINTMENT (OUTPATIENT)
Dept: HEMATOLOGY ONCOLOGY | Facility: CLINIC | Age: 57
End: 2022-05-23

## 2022-05-23 ENCOUNTER — RESULT REVIEW (OUTPATIENT)
Age: 57
End: 2022-05-23

## 2022-05-23 ENCOUNTER — NON-APPOINTMENT (OUTPATIENT)
Age: 57
End: 2022-05-23

## 2022-05-23 VITALS
OXYGEN SATURATION: 96 % | WEIGHT: 156 LBS | SYSTOLIC BLOOD PRESSURE: 130 MMHG | DIASTOLIC BLOOD PRESSURE: 80 MMHG | RESPIRATION RATE: 16 BRPM | HEART RATE: 108 BPM | BODY MASS INDEX: 23.04 KG/M2

## 2022-05-23 LAB
BASOPHILS # BLD AUTO: 0.1 K/UL — SIGNIFICANT CHANGE UP (ref 0–0.2)
BASOPHILS NFR BLD AUTO: 1.2 % — SIGNIFICANT CHANGE UP (ref 0–2)
EOSINOPHIL # BLD AUTO: 0.2 K/UL — SIGNIFICANT CHANGE UP (ref 0–0.5)
EOSINOPHIL NFR BLD AUTO: 4.7 % — SIGNIFICANT CHANGE UP (ref 0–6)
HCT VFR BLD CALC: 36.6 % — LOW (ref 39–50)
HGB BLD-MCNC: 12.3 G/DL — LOW (ref 13–17)
LYMPHOCYTES # BLD AUTO: 0.4 K/UL — LOW (ref 1–3.3)
LYMPHOCYTES # BLD AUTO: 8.8 % — LOW (ref 13–44)
MCHC RBC-ENTMCNC: 32.7 PG — SIGNIFICANT CHANGE UP (ref 27–34)
MCHC RBC-ENTMCNC: 33.5 G/DL — SIGNIFICANT CHANGE UP (ref 32–36)
MCV RBC AUTO: 97.4 FL — SIGNIFICANT CHANGE UP (ref 80–100)
MONOCYTES # BLD AUTO: 0.6 K/UL — SIGNIFICANT CHANGE UP (ref 0–0.9)
MONOCYTES NFR BLD AUTO: 12.6 % — SIGNIFICANT CHANGE UP (ref 2–14)
NEUTROPHILS # BLD AUTO: 3.7 K/UL — SIGNIFICANT CHANGE UP (ref 1.8–7.4)
NEUTROPHILS NFR BLD AUTO: 72.8 % — SIGNIFICANT CHANGE UP (ref 43–77)
PLATELET # BLD AUTO: 132 K/UL — LOW (ref 150–400)
RBC # BLD: 3.76 M/UL — LOW (ref 4.2–5.8)
RBC # FLD: 12.1 % — SIGNIFICANT CHANGE UP (ref 10.3–14.5)
WBC # BLD: 5.1 K/UL — SIGNIFICANT CHANGE UP (ref 3.8–10.5)
WBC # FLD AUTO: 5.1 K/UL — SIGNIFICANT CHANGE UP (ref 3.8–10.5)

## 2022-05-23 PROCEDURE — 77387B: CUSTOM | Mod: 26

## 2022-05-23 PROCEDURE — 77427 RADIATION TX MANAGEMENT X5: CPT

## 2022-05-24 LAB
ALBUMIN SERPL ELPH-MCNC: 4.2 G/DL
ALP BLD-CCNC: 74 U/L
ALT SERPL-CCNC: 15 U/L
ANION GAP SERPL CALC-SCNC: 14 MMOL/L
AST SERPL-CCNC: 15 U/L
BILIRUB SERPL-MCNC: 0.7 MG/DL
BUN SERPL-MCNC: 21 MG/DL
CALCIUM SERPL-MCNC: 9.6 MG/DL
CHLORIDE SERPL-SCNC: 95 MMOL/L
CO2 SERPL-SCNC: 30 MMOL/L
CREAT SERPL-MCNC: 0.7 MG/DL
EGFR: 107 ML/MIN/1.73M2
GLUCOSE SERPL-MCNC: 110 MG/DL
MAGNESIUM SERPL-MCNC: 1.9 MG/DL
POTASSIUM SERPL-SCNC: 5 MMOL/L
PROT SERPL-MCNC: 6.9 G/DL
SODIUM SERPL-SCNC: 139 MMOL/L

## 2022-05-24 PROCEDURE — 77387B: CUSTOM | Mod: 26

## 2022-05-24 NOTE — DISEASE MANAGEMENT
[FreeTextEntry4] : SCCa BOT, p16/HPV16 positive [TTNM] : 4 [NTNM] : 1 [MTNM] : 0 [de-identified] : 7894 [de-identified] : 9004 [de-identified] : head & neck

## 2022-05-25 ENCOUNTER — APPOINTMENT (OUTPATIENT)
Age: 57
End: 2022-05-25

## 2022-05-25 PROCEDURE — 77014: CPT | Mod: 26

## 2022-05-26 ENCOUNTER — NON-APPOINTMENT (OUTPATIENT)
Age: 57
End: 2022-05-26

## 2022-05-26 ENCOUNTER — APPOINTMENT (OUTPATIENT)
Age: 57
End: 2022-05-26

## 2022-05-26 VITALS
BODY MASS INDEX: 23.18 KG/M2 | RESPIRATION RATE: 16 BRPM | WEIGHT: 157 LBS | DIASTOLIC BLOOD PRESSURE: 79 MMHG | OXYGEN SATURATION: 98 % | HEART RATE: 100 BPM | SYSTOLIC BLOOD PRESSURE: 135 MMHG

## 2022-05-26 PROCEDURE — 77387B: CUSTOM | Mod: 26

## 2022-05-26 NOTE — DISEASE MANAGEMENT
[FreeTextEntry4] : SCCa BOT, p16/HPV16 positive [TTNM] : 4 [NTNM] : 1 [MTNM] : 0 [de-identified] : 3560 [de-identified] : 5774 [de-identified] : head & neck

## 2022-05-26 NOTE — REVIEW OF SYSTEMS
[Mucositis Oral: Grade 3 - Severe pain; interfering with oral intake] : Mucositis Oral: Grade 3 - Severe pain; interfering with oral intake

## 2022-05-26 NOTE — PHYSICAL EXAM
[Feeding Tube] : a feeding tube was present [de-identified] : mucositis of posterior oral tongue and oropharynx [de-identified] : no significant erythema around PEG; slight serosanguineous drainage

## 2022-05-27 ENCOUNTER — APPOINTMENT (OUTPATIENT)
Dept: PHYSICAL MEDICINE AND REHAB | Facility: CLINIC | Age: 57
End: 2022-05-27
Payer: MEDICAID

## 2022-05-27 ENCOUNTER — OUTPATIENT (OUTPATIENT)
Dept: OUTPATIENT SERVICES | Facility: HOSPITAL | Age: 57
LOS: 1 days | Discharge: HOME | End: 2022-05-27

## 2022-05-27 VITALS
WEIGHT: 157 LBS | SYSTOLIC BLOOD PRESSURE: 119 MMHG | BODY MASS INDEX: 23.25 KG/M2 | OXYGEN SATURATION: 97 % | HEART RATE: 71 BPM | HEIGHT: 69 IN | DIASTOLIC BLOOD PRESSURE: 66 MMHG

## 2022-05-27 DIAGNOSIS — C02.9 MALIGNANT NEOPLASM OF TONGUE, UNSPECIFIED: ICD-10-CM

## 2022-05-27 DIAGNOSIS — E86.0 DEHYDRATION: ICD-10-CM

## 2022-05-27 PROCEDURE — 77387B: CUSTOM | Mod: 26

## 2022-05-27 PROCEDURE — 99204 OFFICE O/P NEW MOD 45 MIN: CPT

## 2022-05-27 NOTE — VITALS
[Maximal Pain Intensity: 7/10] : 7/10 [Least Pain Intensity: 4/10] : 4/10 [Pain Description/Quality: ___] : Pain description/quality: [unfilled] [Pain Duration: ___] : Pain duration: [unfilled] [Pain Location: ___] : Pain Location: [unfilled] [Pain Interferes with ADLs] : Pain interferes with activities of daily living. [Opioid] : opioid [80: Normal activity with effort; some signs or symptoms of disease.] : 80: Normal activity with effort; some signs or symptoms of disease.

## 2022-05-27 NOTE — HISTORY OF PRESENT ILLNESS
[FreeTextEntry1] : Mr. Moses is a 57 year old male with OPSCC (BOT, cT2 N1, left level 2A lymphadenopathy), undergoing definitive chemoradiation with weekly Cisplatin.\par \par He reports he had been having significant pain and difficulty eating.  Recently had PEG tube placed.  Currently working speech therapy.  Reports significant decrease in appetite.  Reports that he is having burning pain in his mouth.  Denies any swelling in his neck region.  Denies any shoulder pain or neck tightness.\par \par

## 2022-05-27 NOTE — REASON FOR VISIT
[Initial Evaluation] : an initial evaluation [FreeTextEntry1] : Head and neck cancer, pain, decreased appetite

## 2022-05-27 NOTE — PHYSICAL EXAM
[FreeTextEntry1] : Gen: Patient is A&O x 3, NAD\par HEENT: EOMI, hearing grossly normal\par Resp: regular, non - labored\par CV: pulses regular\par Skin: no rashes, erythema\par Lymph: no clubbing, cyanosis, edema, or palpable lymphadenopathy\par Inspection: no instability or misalignment\par ROM: full throughout\par Palpation:no tenderness to palpation\par Sensation: intact to light touch\par Reflexes: 1+ and symmetric throughout\par Strength: 5/5 throughout\par Special tests: -Hawkin's sign \par Gait: normal, non-antalgic\par \par

## 2022-05-27 NOTE — ASSESSMENT
[FreeTextEntry1] : 57 year old male presenting for evaluation.\par \par #Cancer associated pain:\par -Increase Fentanyl patch to 25mcg/hour\par -Oxycodone 5mg prn q 4-6 hours for break through pain\par \par #Neuropathic pain:\par -On gabapentin 600mg TID for anxiety, will likely increase once radiation completed and weaning fentanyl patch\par \par #Dysphagia:\par -Continue SLP\par \par #At risk for lymphedema:\par -No clinical signs of lymphedema\par -Lymphedema education provided to patient \par -Continue to monitor\par \par #Decreased appetite\par -Discussed risks and benefits of medical cannabis to improve appetite\par -Medical cannabis certification completed today. Provided cannabis education, overview of state program, and discussed adverse effects in detail. Counseled that vaporized cannabis is not the preferred route of administration due to the fact that both short-term and long-term risks associated with vaporizing oils are not yet fully understood. Recommend starting with 1:1 THC:CBD formulation at low dose of THC.\par -I Stop # [id number]\par \par Follow up in 1 month.  \par

## 2022-05-30 PROCEDURE — 86803 HEPATITIS C AB TEST: CPT

## 2022-05-30 PROCEDURE — 99285 EMERGENCY DEPT VISIT HI MDM: CPT | Mod: 25

## 2022-05-30 PROCEDURE — 85027 COMPLETE CBC AUTOMATED: CPT

## 2022-05-30 PROCEDURE — 86901 BLOOD TYPING SEROLOGIC RH(D): CPT

## 2022-05-30 PROCEDURE — 83735 ASSAY OF MAGNESIUM: CPT

## 2022-05-30 PROCEDURE — 85025 COMPLETE CBC W/AUTO DIFF WBC: CPT

## 2022-05-30 PROCEDURE — 84100 ASSAY OF PHOSPHORUS: CPT

## 2022-05-30 PROCEDURE — 86900 BLOOD TYPING SEROLOGIC ABO: CPT

## 2022-05-30 PROCEDURE — 80053 COMPREHEN METABOLIC PANEL: CPT

## 2022-05-30 PROCEDURE — U0005: CPT

## 2022-05-30 PROCEDURE — 36415 COLL VENOUS BLD VENIPUNCTURE: CPT

## 2022-05-30 PROCEDURE — U0003: CPT

## 2022-05-30 PROCEDURE — 82962 GLUCOSE BLOOD TEST: CPT

## 2022-05-30 PROCEDURE — 96374 THER/PROPH/DIAG INJ IV PUSH: CPT

## 2022-05-30 PROCEDURE — 93005 ELECTROCARDIOGRAM TRACING: CPT

## 2022-05-30 PROCEDURE — 94640 AIRWAY INHALATION TREATMENT: CPT

## 2022-05-30 PROCEDURE — 80048 BASIC METABOLIC PNL TOTAL CA: CPT

## 2022-05-30 PROCEDURE — 86850 RBC ANTIBODY SCREEN: CPT

## 2022-05-30 PROCEDURE — L8699: CPT

## 2022-05-30 PROCEDURE — 85610 PROTHROMBIN TIME: CPT

## 2022-05-31 ENCOUNTER — APPOINTMENT (OUTPATIENT)
Dept: HEMATOLOGY ONCOLOGY | Facility: CLINIC | Age: 57
End: 2022-05-31
Payer: MEDICAID

## 2022-05-31 ENCOUNTER — RESULT REVIEW (OUTPATIENT)
Age: 57
End: 2022-05-31

## 2022-05-31 ENCOUNTER — NON-APPOINTMENT (OUTPATIENT)
Age: 57
End: 2022-05-31

## 2022-05-31 VITALS
SYSTOLIC BLOOD PRESSURE: 136 MMHG | DIASTOLIC BLOOD PRESSURE: 83 MMHG | BODY MASS INDEX: 22.13 KG/M2 | HEART RATE: 115 BPM | OXYGEN SATURATION: 97 % | HEIGHT: 69 IN | WEIGHT: 149.38 LBS

## 2022-05-31 LAB
BASOPHILS # BLD AUTO: 0 K/UL — SIGNIFICANT CHANGE UP (ref 0–0.2)
BASOPHILS NFR BLD AUTO: 0.8 % — SIGNIFICANT CHANGE UP (ref 0–2)
EOSINOPHIL # BLD AUTO: 0.1 K/UL — SIGNIFICANT CHANGE UP (ref 0–0.5)
EOSINOPHIL NFR BLD AUTO: 2.3 % — SIGNIFICANT CHANGE UP (ref 0–6)
HCT VFR BLD CALC: 36.7 % — LOW (ref 39–50)
HGB BLD-MCNC: 12.6 G/DL — LOW (ref 13–17)
LYMPHOCYTES # BLD AUTO: 0.3 K/UL — LOW (ref 1–3.3)
LYMPHOCYTES # BLD AUTO: 5.9 % — LOW (ref 13–44)
MCHC RBC-ENTMCNC: 32.8 PG — SIGNIFICANT CHANGE UP (ref 27–34)
MCHC RBC-ENTMCNC: 34.3 G/DL — SIGNIFICANT CHANGE UP (ref 32–36)
MCV RBC AUTO: 95.7 FL — SIGNIFICANT CHANGE UP (ref 80–100)
MONOCYTES # BLD AUTO: 0.3 K/UL — SIGNIFICANT CHANGE UP (ref 0–0.9)
MONOCYTES NFR BLD AUTO: 6 % — SIGNIFICANT CHANGE UP (ref 2–14)
NEUTROPHILS # BLD AUTO: 4.7 K/UL — SIGNIFICANT CHANGE UP (ref 1.8–7.4)
NEUTROPHILS NFR BLD AUTO: 85 % — HIGH (ref 43–77)
PLATELET # BLD AUTO: 221 K/UL — SIGNIFICANT CHANGE UP (ref 150–400)
RBC # BLD: 3.83 M/UL — LOW (ref 4.2–5.8)
RBC # FLD: 11.6 % — SIGNIFICANT CHANGE UP (ref 10.3–14.5)
WBC # BLD: 5.5 K/UL — SIGNIFICANT CHANGE UP (ref 3.8–10.5)
WBC # FLD AUTO: 5.5 K/UL — SIGNIFICANT CHANGE UP (ref 3.8–10.5)

## 2022-05-31 PROCEDURE — 99214 OFFICE O/P EST MOD 30 MIN: CPT

## 2022-05-31 PROCEDURE — 77427 RADIATION TX MANAGEMENT X5: CPT

## 2022-05-31 PROCEDURE — 77387B: CUSTOM | Mod: 26

## 2022-06-01 ENCOUNTER — APPOINTMENT (OUTPATIENT)
Age: 57
End: 2022-06-01

## 2022-06-01 PROCEDURE — 77014: CPT | Mod: 26

## 2022-06-02 ENCOUNTER — APPOINTMENT (OUTPATIENT)
Age: 57
End: 2022-06-02

## 2022-06-02 ENCOUNTER — APPOINTMENT (OUTPATIENT)
Dept: GASTROENTEROLOGY | Facility: CLINIC | Age: 57
End: 2022-06-02
Payer: MEDICAID

## 2022-06-02 ENCOUNTER — NON-APPOINTMENT (OUTPATIENT)
Age: 57
End: 2022-06-02

## 2022-06-02 VITALS
WEIGHT: 150 LBS | DIASTOLIC BLOOD PRESSURE: 80 MMHG | RESPIRATION RATE: 16 BRPM | HEART RATE: 89 BPM | SYSTOLIC BLOOD PRESSURE: 139 MMHG | BODY MASS INDEX: 22.15 KG/M2 | OXYGEN SATURATION: 97 %

## 2022-06-02 VITALS
OXYGEN SATURATION: 98 % | WEIGHT: 150 LBS | RESPIRATION RATE: 14 BRPM | DIASTOLIC BLOOD PRESSURE: 76 MMHG | SYSTOLIC BLOOD PRESSURE: 150 MMHG | HEART RATE: 91 BPM | BODY MASS INDEX: 22.22 KG/M2 | HEIGHT: 69 IN

## 2022-06-02 DIAGNOSIS — Z51.11 ENCOUNTER FOR ANTINEOPLASTIC CHEMOTHERAPY: ICD-10-CM

## 2022-06-02 DIAGNOSIS — R11.2 NAUSEA WITH VOMITING, UNSPECIFIED: ICD-10-CM

## 2022-06-02 PROCEDURE — 77387B: CUSTOM | Mod: 26

## 2022-06-02 PROCEDURE — 99212 OFFICE O/P EST SF 10 MIN: CPT

## 2022-06-02 NOTE — PHYSICAL EXAM
[Sclera] : the sclera and conjunctiva were normal [Extraocular Movements] : extraocular movements were intact [Abdomen Soft] : soft [Nondistended] : nondistended [Abdomen Tenderness] : non-tender [Feeding Tube] : a feeding tube was present [Normal] : oriented to person, place and time, the affect was normal, the mood was normal and not anxious [de-identified] : mucositis of posterior oral tongue and oropharynx [de-identified] : mild erythema of the neck [de-identified] : dermatitis around PEG, dressing c/d/i [de-identified] : mild irritation of the left neck from necklace

## 2022-06-02 NOTE — HISTORY OF PRESENT ILLNESS
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 \par \par Pt noticed leakage of liquid after feeds. He has decreased the volume of feeds and tries to sit up after eating but no particular relief. He is also c/o esophageal reflux with feeds, feels the liquids in his chest after eating. No pain. He states nutritionist has recently made a change in the brands of his nutritional feeds which is a peptide based formula, hoping this might help with his reflux.\par \par Patient denies dysphagia, abdominal pain, change in BMs, rectal bleeding, nausea, vomiting, or unexplained weight loss.

## 2022-06-02 NOTE — PHYSICAL EXAM
[General Appearance - Alert] : alert [General Appearance - In No Acute Distress] : in no acute distress [Sclera] : the sclera and conjunctiva were normal [PERRL With Normal Accommodation] : pupils were equal in size, round, and reactive to light [Extraocular Movements] : extraocular movements were intact [Outer Ear] : the ears and nose were normal in appearance [Oropharynx] : the oropharynx was normal [Neck Appearance] : the appearance of the neck was normal [Neck Cervical Mass (___cm)] : no neck mass was observed [] : no respiratory distress [Auscultation Breath Sounds / Voice Sounds] : lungs were clear to auscultation bilaterally [Heart Rate And Rhythm] : heart rate was normal and rhythm regular [Heart Sounds] : normal S1 and S2 [Heart Sounds Gallop] : no gallops [Murmurs] : no murmurs [Heart Sounds Pericardial Friction Rub] : no pericardial rub [Bowel Sounds] : normal bowel sounds [Abdomen Soft] : soft [Abdomen Tenderness] : non-tender [No Focal Deficits] : no focal deficits [Oriented To Time, Place, And Person] : oriented to person, place, and time [Impaired Insight] : insight and judgment were intact [Affect] : the affect was normal [FreeTextEntry1] : +PEG with minimal yellow thick liquid between bumper and skin. Skin overall intact without redness, abrasions, edema or sign of infection. G-tube outer flange positioned at 2cm

## 2022-06-02 NOTE — ASSESSMENT
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 Pt states the G-tube is functioning well, no sign of obstruction. \par \par Gtube was evaluated with Dr. Hess. Appears to be in place and without any sign of obstruction or infection. \par \par Gtube was cleaned and dressed with gauze and secured with paper tape. Pt tolerated well. \par \par Care instructions discussed again. No intervention made today. Pt to f/u with nutritionist about changing formulation of tube feed/brand.

## 2022-06-02 NOTE — DISEASE MANAGEMENT
[Clinical] : TNM Stage: c [III] : III [FreeTextEntry4] : SCCa BOT, p16/HPV16 positive [TTNM] : 4 [NTNM] : 1 [MTNM] : 0 [de-identified] : 0854 [de-identified] : 6645 [de-identified] : head & neck

## 2022-06-02 NOTE — REVIEW OF SYSTEMS
[Edema Limbs: Grade 0] : Edema Limbs: Grade 0  [Fatigue: Grade 1 - Fatigue relieved by rest] : Fatigue: Grade 1 - Fatigue relieved by rest [Localized Edema: Grade 0] : Localized Edema: Grade 0  [Neck Edema: Grade 0] : Neck Edema: Grade 0 [Tinnitus - Grade 0] : Tinnitus - Grade 0 [Mucositis Oral: Grade 3 - Severe pain; interfering with oral intake] : Mucositis Oral: Grade 3 - Severe pain; interfering with oral intake [Hoarseness: Grade 1 - Mild or intermittent voice change; fully understandable; self-resolves] : Hoarseness: Grade 1 - Mild or intermittent voice change; fully understandable; self-resolves [Dysphagia: Grade 2 - Symptomatic and altered eating/swallowing] : Dysphagia: Grade 2 - Symptomatic and altered eating/swallowing [Xerostomia: Grade 2 - Moderate symptoms; oral intake alterations (e.g., copious water, other lubricants, diet limited to purees and/or soft, moist foods); unstimulated saliva 0.1 to 0.2 ml/min] : Xerostomia: Grade 2 - Moderate symptoms; oral intake alterations (e.g., copious water, other lubricants, diet limited to purees and/or soft, moist foods); unstimulated saliva 0.1 to 0.2 ml/min [Oral Pain: Grade 2 - Moderate pain; limiting instrumental ADL] : Oral Pain: Grade 2 - Moderate pain; limiting instrumental ADL [Dysgeusia: Grade 2 - Altered taste with change in diet (e.g., oral supplements); noxious or unpleasant taste; loss of taste] : Dysgeusia: Grade 2 - Altered taste with change in diet (e.g., oral supplements); noxious or unpleasant taste; loss of taste [Skin Hyperpigmentation: Grade 1 - Hyperpigmentation covering <10% BSA; no psychosocial impact] : Skin Hyperpigmentation: Grade 1 - Hyperpigmentation covering <10% BSA; no psychosocial impact [Dermatitis Radiation: Grade 1 - Faint erythema or dry desquamation] : Dermatitis Radiation: Grade 1 - Faint erythema or dry desquamation

## 2022-06-03 LAB
ALBUMIN SERPL ELPH-MCNC: 4.5 G/DL
ALP BLD-CCNC: 86 U/L
ALT SERPL-CCNC: 79 U/L
ANION GAP SERPL CALC-SCNC: 16 MMOL/L
AST SERPL-CCNC: 38 U/L
BILIRUB SERPL-MCNC: 0.5 MG/DL
BUN SERPL-MCNC: 23 MG/DL
CALCIUM SERPL-MCNC: 10 MG/DL
CHLORIDE SERPL-SCNC: 95 MMOL/L
CO2 SERPL-SCNC: 28 MMOL/L
CREAT SERPL-MCNC: 0.81 MG/DL
EGFR: 102 ML/MIN/1.73M2
GLUCOSE SERPL-MCNC: 110 MG/DL
MAGNESIUM SERPL-MCNC: 1.9 MG/DL
POTASSIUM SERPL-SCNC: 5 MMOL/L
PROT SERPL-MCNC: 6.9 G/DL
SODIUM SERPL-SCNC: 140 MMOL/L

## 2022-06-03 PROCEDURE — 77387B: CUSTOM | Mod: 26

## 2022-06-06 ENCOUNTER — RESULT REVIEW (OUTPATIENT)
Age: 57
End: 2022-06-06

## 2022-06-06 ENCOUNTER — APPOINTMENT (OUTPATIENT)
Dept: HEMATOLOGY ONCOLOGY | Facility: CLINIC | Age: 57
End: 2022-06-06

## 2022-06-06 LAB
BASOPHILS # BLD AUTO: 0 K/UL — SIGNIFICANT CHANGE UP (ref 0–0.2)
BASOPHILS NFR BLD AUTO: 1.6 % — SIGNIFICANT CHANGE UP (ref 0–2)
EOSINOPHIL # BLD AUTO: 0 K/UL — SIGNIFICANT CHANGE UP (ref 0–0.5)
EOSINOPHIL NFR BLD AUTO: 1.6 % — SIGNIFICANT CHANGE UP (ref 0–6)
HCT VFR BLD CALC: 34.3 % — LOW (ref 39–50)
HGB BLD-MCNC: 11.3 G/DL — LOW (ref 13–17)
LYMPHOCYTES # BLD AUTO: 0.4 K/UL — LOW (ref 1–3.3)
LYMPHOCYTES # BLD AUTO: 12 % — LOW (ref 13–44)
MCHC RBC-ENTMCNC: 32.2 PG — SIGNIFICANT CHANGE UP (ref 27–34)
MCHC RBC-ENTMCNC: 32.9 G/DL — SIGNIFICANT CHANGE UP (ref 32–36)
MCV RBC AUTO: 97.9 FL — SIGNIFICANT CHANGE UP (ref 80–100)
MONOCYTES # BLD AUTO: 0.4 K/UL — SIGNIFICANT CHANGE UP (ref 0–0.9)
MONOCYTES NFR BLD AUTO: 12.3 % — SIGNIFICANT CHANGE UP (ref 2–14)
NEUTROPHILS # BLD AUTO: 2.2 K/UL — SIGNIFICANT CHANGE UP (ref 1.8–7.4)
NEUTROPHILS NFR BLD AUTO: 72.5 % — SIGNIFICANT CHANGE UP (ref 43–77)
PLATELET # BLD AUTO: 222 K/UL — SIGNIFICANT CHANGE UP (ref 150–400)
RBC # BLD: 3.51 M/UL — LOW (ref 4.2–5.8)
RBC # FLD: 12.6 % — SIGNIFICANT CHANGE UP (ref 10.3–14.5)
WBC # BLD: 3.1 K/UL — LOW (ref 3.8–10.5)
WBC # FLD AUTO: 3.1 K/UL — LOW (ref 3.8–10.5)

## 2022-06-06 PROCEDURE — 77387B: CUSTOM | Mod: 26

## 2022-06-06 NOTE — REVIEW OF SYSTEMS
[Dysphagia: Grade 2 - Symptomatic and altered eating/swallowing] : Dysphagia: Grade 2 - Symptomatic and altered eating/swallowing [Edema Limbs: Grade 0] : Edema Limbs: Grade 0  [Fatigue: Grade 1 - Fatigue relieved by rest] : Fatigue: Grade 1 - Fatigue relieved by rest [Localized Edema: Grade 0] : Localized Edema: Grade 0  [Neck Edema: Grade 0] : Neck Edema: Grade 0 [Tinnitus - Grade 0] : Tinnitus - Grade 0 [Mucositis Oral: Grade 3 - Severe pain; interfering with oral intake] : Mucositis Oral: Grade 3 - Severe pain; interfering with oral intake [Xerostomia: Grade 2 - Moderate symptoms; oral intake alterations (e.g., copious water, other lubricants, diet limited to purees and/or soft, moist foods); unstimulated saliva 0.1 to 0.2 ml/min] : Xerostomia: Grade 2 - Moderate symptoms; oral intake alterations (e.g., copious water, other lubricants, diet limited to purees and/or soft, moist foods); unstimulated saliva 0.1 to 0.2 ml/min [Oral Pain: Grade 2 - Moderate pain; limiting instrumental ADL] : Oral Pain: Grade 2 - Moderate pain; limiting instrumental ADL [Dysgeusia: Grade 2 - Altered taste with change in diet (e.g., oral supplements); noxious or unpleasant taste; loss of taste] : Dysgeusia: Grade 2 - Altered taste with change in diet (e.g., oral supplements); noxious or unpleasant taste; loss of taste [Hoarseness: Grade 1 - Mild or intermittent voice change; fully understandable; self-resolves] : Hoarseness: Grade 1 - Mild or intermittent voice change; fully understandable; self-resolves [Skin Hyperpigmentation: Grade 1 - Hyperpigmentation covering <10% BSA; no psychosocial impact] : Skin Hyperpigmentation: Grade 1 - Hyperpigmentation covering <10% BSA; no psychosocial impact [Dermatitis Radiation: Grade 1 - Faint erythema or dry desquamation] : Dermatitis Radiation: Grade 1 - Faint erythema or dry desquamation

## 2022-06-07 LAB
ALBUMIN SERPL ELPH-MCNC: 4.1 G/DL
ALP BLD-CCNC: 75 U/L
ALT SERPL-CCNC: 19 U/L
ANION GAP SERPL CALC-SCNC: 9 MMOL/L
AST SERPL-CCNC: 16 U/L
BILIRUB SERPL-MCNC: 0.3 MG/DL
BUN SERPL-MCNC: 20 MG/DL
CALCIUM SERPL-MCNC: 8.9 MG/DL
CHLORIDE SERPL-SCNC: 94 MMOL/L
CO2 SERPL-SCNC: 32 MMOL/L
CREAT SERPL-MCNC: 0.73 MG/DL
EGFR: 105 ML/MIN/1.73M2
GLUCOSE SERPL-MCNC: 99 MG/DL
MAGNESIUM SERPL-MCNC: 1.7 MG/DL
POTASSIUM SERPL-SCNC: 5.4 MMOL/L
PROT SERPL-MCNC: 6.3 G/DL
SODIUM SERPL-SCNC: 135 MMOL/L

## 2022-06-07 PROCEDURE — 77427 RADIATION TX MANAGEMENT X5: CPT

## 2022-06-07 PROCEDURE — 77387B: CUSTOM | Mod: 26

## 2022-06-08 ENCOUNTER — APPOINTMENT (OUTPATIENT)
Age: 57
End: 2022-06-08

## 2022-06-08 PROCEDURE — 77014: CPT | Mod: 26

## 2022-06-08 NOTE — ASSESSMENT
[FreeTextEntry1] : 57 year old man with no significant past medical history who presents to the oncology clinic with locally advanced oropharyngeal cancer\par \par # oropharyngeal cancer \par - pre treatment staging PET-CT on 4/8/22:  FDG avid LEFT oropharyngeal/base of tongue mass, compatible with the given diagnosis of carcinoma. FDG avid metastatic LEFT level IIa cervical nodes. Mildly FDG avid subcentimeter RIGHT level IIa cervical node, nonspecific. FDG avid first molar RIGHT mandibular focus, likely dental in etiology. \par - today is C4D7.  C4 delayed one week by hospitalization for weakness, fatigue due to decreased PO intake, now s/p PEG placement. \par - mucinex for thick oral secretions\par - xylimelts for xerostomia \par - RTC Qday for RT. \par - MD/PA follow up Q7-10 days \par - post treatment PET 3 months after completion of RT \par \par # Weight loss\par - s/p PEG placement\par - CECILIO French following closely. Formula changed today due to poor digestion of Jevity \par - advised to rinse mouth often and always before meals to help dysgeusia \par \par # prostamegaly \par - noted; follow up with urology after completion of treatment.

## 2022-06-08 NOTE — HISTORY OF PRESENT ILLNESS
[Date: ____________] : Patient's last distress assessment performed on [unfilled]. [0 - No Distress] : Distress Level: 0 [de-identified] : 56 yo M with h/o chronic sinusitis who was diagnosed with SCC of the left BOT in March 2022.\par \par He saw ENT, Dr. Erick Sultana, c/o headaches, facial pain and left ear pain.  CT maxillofacial sinus on 2/22/22 showed highly suspicious soft tissue fullness oropharynx left glossotonsilar sulcus, base of tongue/vallecula measuring 2.5 x 2.3 concerning for neoplasm. Suspicious left level 2A lymph node measure 1.6 cm.  Follow up CT neck on 3/4/22 showed soft tissue mass left oropharynx 28.5 x 23.9 x 34.3 mm with involvement of the intrinsic tongue muscles (T4).  2 unilateral pathologic left 2A lymph nodes with cystic necrosis concerning for tory metastasis (N1).\par \par He next saw ENT surgery, Dr. Renny Tena, where he was scheduled for EUA on 3/24/22 which stated "by palpation, the patient was found to have a broadly based tumor epicentered in his tongue base.  Anteriorly, it extended to the proximal tongue base circumvallate papilla region.  It extended laterally to, but not  across the glossotonsillar sulcus anteriorly and more posteriorly, laterally it extended to, but not across the glossopharyngeal sulcus.  The tumor seemed to extend just to the midline both in its anterior and posterior extent.  The tumor seemed to approach, but not cross into the vallecula."  Biopsy c/w non-keratinizing SCC.\par \par Staging PET pending for 4/8/22, which did not show evidence of metastatic disease. \par \par The patient was started on defintiive cRT with weekly cisplatin.  [de-identified] : Patient presents on C4D7 CRT with weekly Cisplatin for SCC of the left BOT.  C4 delayed one week by hospitalization for weakness, fatigue due to decreased PO intake, now s/p PEG placement. \par + Stool urgency, but mostly formed, 1-2 episodes of loose stool. + Odynophagia. + Dysphagia. + Dysgeusia. Calories ~100% via PEG. + Xerostomia. + Thick oral secretions. + Mildly erythematous skin around neck. No N/V. No worse than baseline tinnitus. No fevers or SOB.

## 2022-06-08 NOTE — PHYSICAL EXAM
[Fully active, able to carry on all pre-disease performance without restriction] : Status 0 - Fully active, able to carry on all pre-disease performance without restriction [Thin] : thin [Normal] : affect appropriate [de-identified] : palpable cervical lymph node on the left, improved

## 2022-06-09 ENCOUNTER — NON-APPOINTMENT (OUTPATIENT)
Age: 57
End: 2022-06-09

## 2022-06-09 ENCOUNTER — APPOINTMENT (OUTPATIENT)
Age: 57
End: 2022-06-09

## 2022-06-09 ENCOUNTER — RESULT REVIEW (OUTPATIENT)
Age: 57
End: 2022-06-09

## 2022-06-09 VITALS
OXYGEN SATURATION: 95 % | WEIGHT: 155 LBS | DIASTOLIC BLOOD PRESSURE: 68 MMHG | BODY MASS INDEX: 22.96 KG/M2 | HEART RATE: 87 BPM | SYSTOLIC BLOOD PRESSURE: 143 MMHG | HEIGHT: 69 IN | RESPIRATION RATE: 15 BRPM | TEMPERATURE: 97 F

## 2022-06-09 DIAGNOSIS — L53.9 ERYTHEMATOUS CONDITION, UNSPECIFIED: ICD-10-CM

## 2022-06-09 LAB
ALBUMIN SERPL ELPH-MCNC: 4.1 G/DL — SIGNIFICANT CHANGE UP (ref 3.3–5)
ALP SERPL-CCNC: 72 U/L — SIGNIFICANT CHANGE UP (ref 40–120)
ALT FLD-CCNC: 17 U/L — SIGNIFICANT CHANGE UP (ref 10–45)
ANION GAP SERPL CALC-SCNC: 11 MMOL/L — SIGNIFICANT CHANGE UP (ref 5–17)
AST SERPL-CCNC: 18 U/L — SIGNIFICANT CHANGE UP (ref 10–40)
BILIRUB SERPL-MCNC: 0.2 MG/DL — SIGNIFICANT CHANGE UP (ref 0.2–1.2)
BUN SERPL-MCNC: 24 MG/DL — HIGH (ref 7–23)
CALCIUM SERPL-MCNC: 9.1 MG/DL — SIGNIFICANT CHANGE UP (ref 8.4–10.5)
CHLORIDE SERPL-SCNC: 98 MMOL/L — SIGNIFICANT CHANGE UP (ref 96–108)
CO2 SERPL-SCNC: 29 MMOL/L — SIGNIFICANT CHANGE UP (ref 22–31)
CREAT SERPL-MCNC: 0.78 MG/DL — SIGNIFICANT CHANGE UP (ref 0.5–1.3)
EGFR: 103 ML/MIN/1.73M2 — SIGNIFICANT CHANGE UP
GLUCOSE SERPL-MCNC: 101 MG/DL — HIGH (ref 70–99)
MAGNESIUM SERPL-MCNC: 2 MG/DL — SIGNIFICANT CHANGE UP (ref 1.6–2.6)
POTASSIUM SERPL-MCNC: 4.7 MMOL/L — SIGNIFICANT CHANGE UP (ref 3.5–5.3)
POTASSIUM SERPL-SCNC: 4.7 MMOL/L — SIGNIFICANT CHANGE UP (ref 3.5–5.3)
PROT SERPL-MCNC: 6.7 G/DL — SIGNIFICANT CHANGE UP (ref 6–8.3)
SODIUM SERPL-SCNC: 138 MMOL/L — SIGNIFICANT CHANGE UP (ref 135–145)

## 2022-06-09 PROCEDURE — 77387B: CUSTOM | Mod: 26

## 2022-06-09 NOTE — DISEASE MANAGEMENT
[Clinical] : TNM Stage: c [III] : III [FreeTextEntry4] : SCCa BOT, p16/HPV16 positive [TTNM] : 4 [NTNM] : 1 [MTNM] : 0 [de-identified] : 3784 [de-identified] : 0726 [de-identified] : head & neck

## 2022-06-09 NOTE — VITALS
[Pain Description/Quality: ___] : Pain description/quality: [unfilled] [Pain Duration: ___] : Pain duration: [unfilled] [Pain Location: ___] : Pain Location: [unfilled] [Pain Interferes with ADLs] : Pain interferes with activities of daily living. [Opioid] : opioid [80: Normal activity with effort; some signs or symptoms of disease.] : 80: Normal activity with effort; some signs or symptoms of disease.  [Maximal Pain Intensity: 4/10] : 4/10 [Least Pain Intensity: 2/10] : 2/10

## 2022-06-09 NOTE — PHYSICAL EXAM
[Sclera] : the sclera and conjunctiva were normal [Extraocular Movements] : extraocular movements were intact [Abdomen Soft] : soft [Nondistended] : nondistended [Abdomen Tenderness] : non-tender [Feeding Tube] : a feeding tube was present [Normal] : oriented to person, place and time, the affect was normal, the mood was normal and not anxious [de-identified] : mucositis of posterior oral tongue and oropharynx [de-identified] : mild erythema and hyperpigmentation of the neck, small area of dry desquamation [de-identified] : dermatitis around PEG, dressing c/d/i [de-identified] : mild irritation of the left neck from necklace

## 2022-06-10 PROCEDURE — 77387B: CUSTOM | Mod: 26

## 2022-06-13 ENCOUNTER — RESULT REVIEW (OUTPATIENT)
Age: 57
End: 2022-06-13

## 2022-06-13 ENCOUNTER — APPOINTMENT (OUTPATIENT)
Dept: HEMATOLOGY ONCOLOGY | Facility: CLINIC | Age: 57
End: 2022-06-13

## 2022-06-13 LAB
ANISOCYTOSIS BLD QL: SLIGHT — SIGNIFICANT CHANGE UP
BASOPHILS # BLD AUTO: 0.1 K/UL — SIGNIFICANT CHANGE UP (ref 0–0.2)
BASOPHILS NFR BLD AUTO: 1 % — SIGNIFICANT CHANGE UP (ref 0–2)
DOHLE BOD BLD QL SMEAR: PRESENT — SIGNIFICANT CHANGE UP
ELLIPTOCYTES BLD QL SMEAR: SLIGHT — SIGNIFICANT CHANGE UP
EOSINOPHIL # BLD AUTO: 0.1 K/UL — SIGNIFICANT CHANGE UP (ref 0–0.5)
EOSINOPHIL NFR BLD AUTO: 1 % — SIGNIFICANT CHANGE UP (ref 0–6)
HCT VFR BLD CALC: 30.3 % — LOW (ref 39–50)
HGB BLD-MCNC: 10.4 G/DL — LOW (ref 13–17)
HYPOSEGMENTATION: PRESENT — SIGNIFICANT CHANGE UP
LYMPHOCYTES # BLD AUTO: 0.3 K/UL — LOW (ref 1–3.3)
LYMPHOCYTES # BLD AUTO: 4 % — LOW (ref 13–44)
MACROCYTES BLD QL: SLIGHT — SIGNIFICANT CHANGE UP
MCHC RBC-ENTMCNC: 32.4 PG — SIGNIFICANT CHANGE UP (ref 27–34)
MCHC RBC-ENTMCNC: 34.2 G/DL — SIGNIFICANT CHANGE UP (ref 32–36)
MCV RBC AUTO: 94.7 FL — SIGNIFICANT CHANGE UP (ref 80–100)
METAMYELOCYTES # FLD: 2 % — HIGH (ref 0–0)
MICROCYTES BLD QL: SLIGHT — SIGNIFICANT CHANGE UP
MONOCYTES # BLD AUTO: 0.5 K/UL — SIGNIFICANT CHANGE UP (ref 0–0.9)
MONOCYTES NFR BLD AUTO: 19 % — HIGH (ref 2–14)
MYELOCYTES NFR BLD: 1 % — HIGH (ref 0–0)
NEUTROPHILS # BLD AUTO: 2.5 K/UL — SIGNIFICANT CHANGE UP (ref 1.8–7.4)
NEUTROPHILS NFR BLD AUTO: 67 % — SIGNIFICANT CHANGE UP (ref 43–77)
NEUTS BAND # BLD: 5 % — SIGNIFICANT CHANGE UP (ref 0–8)
OVALOCYTES BLD QL SMEAR: SLIGHT — SIGNIFICANT CHANGE UP
PLAT MORPH BLD: NORMAL — SIGNIFICANT CHANGE UP
PLATELET # BLD AUTO: 140 K/UL — LOW (ref 150–400)
POIKILOCYTOSIS BLD QL AUTO: SLIGHT — SIGNIFICANT CHANGE UP
RBC # BLD: 3.2 M/UL — LOW (ref 4.2–5.8)
RBC # FLD: 12.6 % — SIGNIFICANT CHANGE UP (ref 10.3–14.5)
RBC BLD AUTO: SIGNIFICANT CHANGE UP
TOXIC GRANULES BLD QL SMEAR: PRESENT — SIGNIFICANT CHANGE UP
WBC # BLD: 3.5 K/UL — LOW (ref 3.8–10.5)
WBC # FLD AUTO: 3.5 K/UL — LOW (ref 3.8–10.5)

## 2022-06-13 PROCEDURE — 77387B: CUSTOM | Mod: 26

## 2022-06-13 NOTE — REVIEW OF SYSTEMS
[Dysphagia: Grade 2 - Symptomatic and altered eating/swallowing] : Dysphagia: Grade 2 - Symptomatic and altered eating/swallowing [Edema Limbs: Grade 0] : Edema Limbs: Grade 0  [Fatigue: Grade 1 - Fatigue relieved by rest] : Fatigue: Grade 1 - Fatigue relieved by rest [Localized Edema: Grade 0] : Localized Edema: Grade 0  [Neck Edema: Grade 0] : Neck Edema: Grade 0 [Mucositis Oral: Grade 3 - Severe pain; interfering with oral intake] : Mucositis Oral: Grade 3 - Severe pain; interfering with oral intake [Tinnitus - Grade 0] : Tinnitus - Grade 0 [Xerostomia: Grade 2 - Moderate symptoms; oral intake alterations (e.g., copious water, other lubricants, diet limited to purees and/or soft, moist foods); unstimulated saliva 0.1 to 0.2 ml/min] : Xerostomia: Grade 2 - Moderate symptoms; oral intake alterations (e.g., copious water, other lubricants, diet limited to purees and/or soft, moist foods); unstimulated saliva 0.1 to 0.2 ml/min [Oral Pain: Grade 2 - Moderate pain; limiting instrumental ADL] : Oral Pain: Grade 2 - Moderate pain; limiting instrumental ADL [Dysgeusia: Grade 2 - Altered taste with change in diet (e.g., oral supplements); noxious or unpleasant taste; loss of taste] : Dysgeusia: Grade 2 - Altered taste with change in diet (e.g., oral supplements); noxious or unpleasant taste; loss of taste [Hoarseness: Grade 1 - Mild or intermittent voice change; fully understandable; self-resolves] : Hoarseness: Grade 1 - Mild or intermittent voice change; fully understandable; self-resolves [Skin Hyperpigmentation: Grade 1 - Hyperpigmentation covering <10% BSA; no psychosocial impact] : Skin Hyperpigmentation: Grade 1 - Hyperpigmentation covering <10% BSA; no psychosocial impact [Dermatitis Radiation: Grade 1 - Faint erythema or dry desquamation] : Dermatitis Radiation: Grade 1 - Faint erythema or dry desquamation

## 2022-06-14 ENCOUNTER — APPOINTMENT (OUTPATIENT)
Dept: HEMATOLOGY ONCOLOGY | Facility: CLINIC | Age: 57
End: 2022-06-14
Payer: MEDICAID

## 2022-06-14 ENCOUNTER — RESULT REVIEW (OUTPATIENT)
Age: 57
End: 2022-06-14

## 2022-06-14 ENCOUNTER — APPOINTMENT (OUTPATIENT)
Age: 57
End: 2022-06-14

## 2022-06-14 ENCOUNTER — NON-APPOINTMENT (OUTPATIENT)
Age: 57
End: 2022-06-14

## 2022-06-14 VITALS
HEIGHT: 69 IN | OXYGEN SATURATION: 93 % | DIASTOLIC BLOOD PRESSURE: 80 MMHG | HEART RATE: 95 BPM | BODY MASS INDEX: 22.51 KG/M2 | SYSTOLIC BLOOD PRESSURE: 147 MMHG | WEIGHT: 152 LBS

## 2022-06-14 LAB
ANISOCYTOSIS BLD QL: SLIGHT — SIGNIFICANT CHANGE UP
BASOPHILS # BLD AUTO: 0.1 K/UL — SIGNIFICANT CHANGE UP (ref 0–0.2)
BASOPHILS NFR BLD AUTO: 2 % — SIGNIFICANT CHANGE UP (ref 0–2)
DOHLE BOD BLD QL SMEAR: PRESENT — SIGNIFICANT CHANGE UP
EOSINOPHIL # BLD AUTO: 0 K/UL — SIGNIFICANT CHANGE UP (ref 0–0.5)
HCT VFR BLD CALC: 29.4 % — LOW (ref 39–50)
HGB BLD-MCNC: 9.8 G/DL — LOW (ref 13–17)
HYPOSEGMENTATION: PRESENT — SIGNIFICANT CHANGE UP
LG PLATELETS BLD QL AUTO: SLIGHT — SIGNIFICANT CHANGE UP
LYMPHOCYTES # BLD AUTO: 0.2 K/UL — LOW (ref 1–3.3)
LYMPHOCYTES # BLD AUTO: 4 % — LOW (ref 13–44)
MACROCYTES BLD QL: SLIGHT — SIGNIFICANT CHANGE UP
MCHC RBC-ENTMCNC: 32.2 PG — SIGNIFICANT CHANGE UP (ref 27–34)
MCHC RBC-ENTMCNC: 33.3 G/DL — SIGNIFICANT CHANGE UP (ref 32–36)
MCV RBC AUTO: 96.8 FL — SIGNIFICANT CHANGE UP (ref 80–100)
METAMYELOCYTES # FLD: 4 % — HIGH (ref 0–0)
MICROCYTES BLD QL: SLIGHT — SIGNIFICANT CHANGE UP
MONOCYTES # BLD AUTO: 0.9 K/UL — SIGNIFICANT CHANGE UP (ref 0–0.9)
MONOCYTES NFR BLD AUTO: 14 % — SIGNIFICANT CHANGE UP (ref 2–14)
NEUTROPHILS # BLD AUTO: 3.6 K/UL — SIGNIFICANT CHANGE UP (ref 1.8–7.4)
NEUTROPHILS NFR BLD AUTO: 71 % — SIGNIFICANT CHANGE UP (ref 43–77)
NEUTS BAND # BLD: 5 % — SIGNIFICANT CHANGE UP (ref 0–8)
OVALOCYTES BLD QL SMEAR: SLIGHT — SIGNIFICANT CHANGE UP
PLAT MORPH BLD: NORMAL — SIGNIFICANT CHANGE UP
PLATELET # BLD AUTO: 128 K/UL — LOW (ref 150–400)
POIKILOCYTOSIS BLD QL AUTO: SLIGHT — SIGNIFICANT CHANGE UP
POLYCHROMASIA BLD QL SMEAR: SLIGHT — SIGNIFICANT CHANGE UP
RBC # BLD: 3.03 M/UL — LOW (ref 4.2–5.8)
RBC # FLD: 13.1 % — SIGNIFICANT CHANGE UP (ref 10.3–14.5)
RBC BLD AUTO: SIGNIFICANT CHANGE UP
TOXIC GRANULES BLD QL SMEAR: PRESENT — SIGNIFICANT CHANGE UP
WBC # BLD: 4.7 K/UL — SIGNIFICANT CHANGE UP (ref 3.8–10.5)
WBC # FLD AUTO: 4.7 K/UL — SIGNIFICANT CHANGE UP (ref 3.8–10.5)

## 2022-06-14 PROCEDURE — 77427 RADIATION TX MANAGEMENT X5: CPT

## 2022-06-14 PROCEDURE — 99214 OFFICE O/P EST MOD 30 MIN: CPT

## 2022-06-14 PROCEDURE — 77387B: CUSTOM | Mod: 26

## 2022-06-14 RX ORDER — FENTANYL 50 UG/H
50 PATCH, EXTENDED RELEASE TRANSDERMAL
Qty: 10 | Refills: 0 | Status: COMPLETED | COMMUNITY
Start: 2022-05-13 | End: 2022-06-14

## 2022-06-15 ENCOUNTER — APPOINTMENT (OUTPATIENT)
Age: 57
End: 2022-06-15

## 2022-06-15 LAB
ALBUMIN SERPL ELPH-MCNC: 3.8 G/DL — SIGNIFICANT CHANGE UP (ref 3.3–5)
ALBUMIN SERPL ELPH-MCNC: 3.9 G/DL
ALP BLD-CCNC: 82 U/L
ALP SERPL-CCNC: 76 U/L — SIGNIFICANT CHANGE UP (ref 40–120)
ALT FLD-CCNC: 19 U/L — SIGNIFICANT CHANGE UP (ref 10–45)
ALT SERPL-CCNC: 18 U/L
ANION GAP SERPL CALC-SCNC: 15 MMOL/L
ANION GAP SERPL CALC-SCNC: 15 MMOL/L — SIGNIFICANT CHANGE UP (ref 5–17)
AST SERPL-CCNC: 22 U/L
AST SERPL-CCNC: 22 U/L — SIGNIFICANT CHANGE UP (ref 10–40)
BILIRUB SERPL-MCNC: 0.4 MG/DL
BILIRUB SERPL-MCNC: 0.4 MG/DL — SIGNIFICANT CHANGE UP (ref 0.2–1.2)
BUN SERPL-MCNC: 23 MG/DL
BUN SERPL-MCNC: 23 MG/DL — SIGNIFICANT CHANGE UP (ref 7–23)
CALCIUM SERPL-MCNC: 9 MG/DL
CALCIUM SERPL-MCNC: 9.2 MG/DL — SIGNIFICANT CHANGE UP (ref 8.4–10.5)
CHLORIDE SERPL-SCNC: 88 MMOL/L
CHLORIDE SERPL-SCNC: 88 MMOL/L — LOW (ref 96–108)
CO2 SERPL-SCNC: 29 MMOL/L — SIGNIFICANT CHANGE UP (ref 22–31)
CO2 SERPL-SCNC: 30 MMOL/L
CREAT SERPL-MCNC: 0.69 MG/DL — SIGNIFICANT CHANGE UP (ref 0.5–1.3)
CREAT SERPL-MCNC: 0.75 MG/DL
EGFR: 105 ML/MIN/1.73M2
EGFR: 107 ML/MIN/1.73M2 — SIGNIFICANT CHANGE UP
GLUCOSE SERPL-MCNC: 116 MG/DL — HIGH (ref 70–99)
GLUCOSE SERPL-MCNC: 124 MG/DL
MAGNESIUM SERPL-MCNC: 1.7 MG/DL
MAGNESIUM SERPL-MCNC: 1.7 MG/DL — SIGNIFICANT CHANGE UP (ref 1.6–2.6)
POTASSIUM SERPL-MCNC: 4.3 MMOL/L — SIGNIFICANT CHANGE UP (ref 3.5–5.3)
POTASSIUM SERPL-SCNC: 4.3 MMOL/L — SIGNIFICANT CHANGE UP (ref 3.5–5.3)
POTASSIUM SERPL-SCNC: 5 MMOL/L
PROT SERPL-MCNC: 6.6 G/DL
PROT SERPL-MCNC: 7 G/DL — SIGNIFICANT CHANGE UP (ref 6–8.3)
SODIUM SERPL-SCNC: 131 MMOL/L — LOW (ref 135–145)
SODIUM SERPL-SCNC: 133 MMOL/L

## 2022-06-15 PROCEDURE — 77014: CPT | Mod: 26

## 2022-06-15 RX ORDER — GABAPENTIN 400 MG/1
1 CAPSULE ORAL
Qty: 0 | Refills: 0 | DISCHARGE

## 2022-06-15 NOTE — PHYSICAL EXAM
[Fully active, able to carry on all pre-disease performance without restriction] : Status 0 - Fully active, able to carry on all pre-disease performance without restriction [Thin] : thin [Normal] : affect appropriate [de-identified] : + pharyngeal erythema.  [de-identified] : palpable cervical lymph node on the left, improved

## 2022-06-15 NOTE — HISTORY OF PRESENT ILLNESS
[Date: ____________] : Patient's last distress assessment performed on [unfilled]. [0 - No Distress] : Distress Level: 0 [de-identified] : 56 yo M with h/o chronic sinusitis who was diagnosed with SCC of the left BOT in March 2022.\par \par He saw ENT, Dr. Erick Sultana, c/o headaches, facial pain and left ear pain.  CT maxillofacial sinus on 2/22/22 showed highly suspicious soft tissue fullness oropharynx left glossotonsilar sulcus, base of tongue/vallecula measuring 2.5 x 2.3 concerning for neoplasm. Suspicious left level 2A lymph node measure 1.6 cm.  Follow up CT neck on 3/4/22 showed soft tissue mass left oropharynx 28.5 x 23.9 x 34.3 mm with involvement of the intrinsic tongue muscles (T4).  2 unilateral pathologic left 2A lymph nodes with cystic necrosis concerning for tory metastasis (N1).\par \par He next saw ENT surgery, Dr. Renny Tena, where he was scheduled for EUA on 3/24/22 which stated "by palpation, the patient was found to have a broadly based tumor epicentered in his tongue base.  Anteriorly, it extended to the proximal tongue base circumvallate papilla region.  It extended laterally to, but not  across the glossotonsillar sulcus anteriorly and more posteriorly, laterally it extended to, but not across the glossopharyngeal sulcus.  The tumor seemed to extend just to the midline both in its anterior and posterior extent.  The tumor seemed to approach, but not cross into the vallecula."  Biopsy c/w non-keratinizing SCC.\par \par Staging PET pending for 4/8/22, which did not show evidence of metastatic disease. \par \par The patient was started on defintiive cRT with weekly cisplatin.  [de-identified] :  + Odynophagia. + Dysphagia. + Dysgeusia,  + Thick oral secretions. + Mildly erythematous skin around neck. No N/V. No worse than baseline tinnitus. No fevers or SOB. Patient increased fentanyl dose with good control of pain. Not able to tolerate oral PO intake at all.

## 2022-06-16 ENCOUNTER — APPOINTMENT (OUTPATIENT)
Age: 57
End: 2022-06-16

## 2022-06-16 ENCOUNTER — NON-APPOINTMENT (OUTPATIENT)
Age: 57
End: 2022-06-16

## 2022-06-16 VITALS
DIASTOLIC BLOOD PRESSURE: 70 MMHG | BODY MASS INDEX: 23.4 KG/M2 | RESPIRATION RATE: 16 BRPM | OXYGEN SATURATION: 96 % | HEIGHT: 69 IN | WEIGHT: 158 LBS | SYSTOLIC BLOOD PRESSURE: 145 MMHG | HEART RATE: 84 BPM | TEMPERATURE: 97 F

## 2022-06-16 PROCEDURE — 77387B: CUSTOM | Mod: 26

## 2022-06-16 RX ORDER — METOCLOPRAMIDE HYDROCHLORIDE 10 MG/1
10 TABLET, ORALLY DISINTEGRATING ORAL
Qty: 60 | Refills: 0 | Status: COMPLETED | COMMUNITY
Start: 2022-06-02 | End: 2022-06-16

## 2022-06-16 RX ORDER — VALACYCLOVIR 1 G/1
1 TABLET, FILM COATED ORAL
Qty: 21 | Refills: 0 | Status: COMPLETED | COMMUNITY
Start: 2022-02-12

## 2022-06-16 RX ORDER — PREDNISONE 10 MG/1
10 TABLET ORAL
Qty: 14 | Refills: 0 | Status: COMPLETED | COMMUNITY
Start: 2022-01-11

## 2022-06-16 RX ORDER — AMOXICILLIN 500 MG/1
500 CAPSULE ORAL
Qty: 21 | Refills: 0 | Status: COMPLETED | COMMUNITY
Start: 2022-01-20

## 2022-06-16 RX ORDER — 1.1% SODIUM FLUORIDE PRESCRIPTION DENTAL CREAM 5 MG/G
1.1 CREAM DENTAL
Qty: 51 | Refills: 0 | Status: COMPLETED | COMMUNITY
Start: 2022-04-06

## 2022-06-16 RX ORDER — HYDROCODONE BITARTRATE AND ACETAMINOPHEN 5; 325 MG/1; MG/1
5-325 TABLET ORAL
Qty: 20 | Refills: 0 | Status: COMPLETED | COMMUNITY
Start: 2022-03-09

## 2022-06-16 RX ORDER — PREDNISONE 20 MG/1
20 TABLET ORAL
Qty: 10 | Refills: 0 | Status: COMPLETED | COMMUNITY
Start: 2022-02-12

## 2022-06-16 RX ORDER — AZITHROMYCIN 250 MG/1
250 TABLET, FILM COATED ORAL
Qty: 6 | Refills: 0 | Status: COMPLETED | COMMUNITY
Start: 2022-01-03

## 2022-06-16 RX ORDER — FENTANYL 25 UG/H
25 PATCH, EXTENDED RELEASE TRANSDERMAL
Qty: 10 | Refills: 0 | Status: COMPLETED | COMMUNITY
Start: 2022-05-13

## 2022-06-16 RX ORDER — OXYCODONE AND ACETAMINOPHEN 5; 325 MG/1; MG/1
5-325 TABLET ORAL
Qty: 8 | Refills: 0 | Status: COMPLETED | COMMUNITY
Start: 2022-03-24

## 2022-06-16 NOTE — PHYSICAL EXAM
[Sclera] : the sclera and conjunctiva were normal [Extraocular Movements] : extraocular movements were intact [Abdomen Soft] : soft [Nondistended] : nondistended [Abdomen Tenderness] : non-tender [Feeding Tube] : a feeding tube was present [Normal] : oriented to person, place and time, the affect was normal, the mood was normal and not anxious [de-identified] : mucositis of posterior oral tongue and oropharynx [de-identified] : mild erythema and hyperpigmentation of the neck, small area of dry desquamation [de-identified] : dressing c/d/i [de-identified] : mild irritation of the left neck from necklace

## 2022-06-16 NOTE — DISEASE MANAGEMENT
[Clinical] : TNM Stage: c [III] : III [FreeTextEntry4] : SCCa BOT, p16/HPV16 positive [TTNM] : 4 [NTNM] : 1 [MTNM] : 0 [de-identified] : 4154 [de-identified] : 3141 [de-identified] : head & neck

## 2022-06-16 NOTE — VITALS
[Pain Description/Quality: ___] : Pain description/quality: [unfilled] [Pain Duration: ___] : Pain duration: [unfilled] [Pain Location: ___] : Pain Location: [unfilled] [Pain Interferes with ADLs] : Pain interferes with activities of daily living. [Opioid] : opioid [80: Normal activity with effort; some signs or symptoms of disease.] : 80: Normal activity with effort; some signs or symptoms of disease.  [Maximal Pain Intensity: 6/10] : 6/10 [Least Pain Intensity: 4/10] : 4/10

## 2022-06-17 PROCEDURE — 77014: CPT | Mod: 26

## 2022-06-20 ENCOUNTER — INPATIENT (INPATIENT)
Facility: HOSPITAL | Age: 57
LOS: 3 days | Discharge: HOME | DRG: 871 | End: 2022-06-24
Attending: STUDENT IN AN ORGANIZED HEALTH CARE EDUCATION/TRAINING PROGRAM | Admitting: HOSPITALIST
Payer: MEDICAID

## 2022-06-20 VITALS
HEIGHT: 69 IN | WEIGHT: 154.98 LBS | RESPIRATION RATE: 17 BRPM | HEART RATE: 95 BPM | DIASTOLIC BLOOD PRESSURE: 68 MMHG | SYSTOLIC BLOOD PRESSURE: 124 MMHG | OXYGEN SATURATION: 98 % | TEMPERATURE: 101 F

## 2022-06-20 VITALS
BODY MASS INDEX: 22.89 KG/M2 | HEART RATE: 120 BPM | TEMPERATURE: 99.6 F | SYSTOLIC BLOOD PRESSURE: 145 MMHG | WEIGHT: 155 LBS | DIASTOLIC BLOOD PRESSURE: 65 MMHG | OXYGEN SATURATION: 92 %

## 2022-06-20 DIAGNOSIS — J18.9 PNEUMONIA, UNSPECIFIED ORGANISM: ICD-10-CM

## 2022-06-20 LAB
ALBUMIN SERPL ELPH-MCNC: 3.3 G/DL — SIGNIFICANT CHANGE UP (ref 3.3–5.2)
ALP SERPL-CCNC: 100 U/L — SIGNIFICANT CHANGE UP (ref 40–120)
ALT FLD-CCNC: 50 U/L — HIGH
ANION GAP SERPL CALC-SCNC: 9 MMOL/L — SIGNIFICANT CHANGE UP (ref 5–17)
APTT BLD: 25.3 SEC — LOW (ref 27.5–35.5)
AST SERPL-CCNC: 59 U/L — HIGH
BASE EXCESS BLDV CALC-SCNC: 16.7 MMOL/L — HIGH (ref -2–3)
BASOPHILS # BLD AUTO: 0 K/UL — SIGNIFICANT CHANGE UP (ref 0–0.2)
BASOPHILS NFR BLD AUTO: 0 % — SIGNIFICANT CHANGE UP (ref 0–2)
BILIRUB SERPL-MCNC: 0.6 MG/DL — SIGNIFICANT CHANGE UP (ref 0.4–2)
BUN SERPL-MCNC: 18.6 MG/DL — SIGNIFICANT CHANGE UP (ref 8–20)
CA-I SERPL-SCNC: 1.04 MMOL/L — LOW (ref 1.15–1.33)
CALCIUM SERPL-MCNC: 8.2 MG/DL — LOW (ref 8.6–10.2)
CHLORIDE BLDV-SCNC: 88 MMOL/L — LOW (ref 98–107)
CHLORIDE SERPL-SCNC: 85 MMOL/L — LOW (ref 98–107)
CO2 SERPL-SCNC: 35 MMOL/L — HIGH (ref 22–29)
CREAT SERPL-MCNC: 0.53 MG/DL — SIGNIFICANT CHANGE UP (ref 0.5–1.3)
EGFR: 116 ML/MIN/1.73M2 — SIGNIFICANT CHANGE UP
EOSINOPHIL # BLD AUTO: 0 K/UL — SIGNIFICANT CHANGE UP (ref 0–0.5)
EOSINOPHIL NFR BLD AUTO: 0 % — SIGNIFICANT CHANGE UP (ref 0–6)
GAS PNL BLDV: 125 MMOL/L — LOW (ref 136–145)
GAS PNL BLDV: SIGNIFICANT CHANGE UP
GLUCOSE BLDV-MCNC: 113 MG/DL — HIGH (ref 70–99)
GLUCOSE SERPL-MCNC: 112 MG/DL — HIGH (ref 70–99)
HCO3 BLDV-SCNC: 40 MMOL/L — HIGH (ref 22–29)
HCT VFR BLD CALC: 23.5 % — LOW (ref 39–50)
HCT VFR BLDA CALC: 25 % — SIGNIFICANT CHANGE UP
HGB BLD CALC-MCNC: 8.4 G/DL — LOW (ref 12.6–17.4)
HGB BLD-MCNC: 8.1 G/DL — LOW (ref 13–17)
INR BLD: 1.55 RATIO — HIGH (ref 0.88–1.16)
LACTATE BLDV-MCNC: 0.6 MMOL/L — SIGNIFICANT CHANGE UP (ref 0.5–2)
LYMPHOCYTES # BLD AUTO: 0.17 K/UL — LOW (ref 1–3.3)
LYMPHOCYTES # BLD AUTO: 10.4 % — LOW (ref 13–44)
MANUAL SMEAR VERIFICATION: SIGNIFICANT CHANGE UP
MCHC RBC-ENTMCNC: 33.2 PG — SIGNIFICANT CHANGE UP (ref 27–34)
MCHC RBC-ENTMCNC: 34.5 GM/DL — SIGNIFICANT CHANGE UP (ref 32–36)
MCV RBC AUTO: 96.3 FL — SIGNIFICANT CHANGE UP (ref 80–100)
METAMYELOCYTES # FLD: 1.7 % — HIGH (ref 0–0)
MONOCYTES # BLD AUTO: 0.23 K/UL — SIGNIFICANT CHANGE UP (ref 0–0.9)
MONOCYTES NFR BLD AUTO: 13.9 % — SIGNIFICANT CHANGE UP (ref 2–14)
NEUTROPHILS # BLD AUTO: 1.22 K/UL — LOW (ref 1.8–7.4)
NEUTROPHILS NFR BLD AUTO: 73.1 % — SIGNIFICANT CHANGE UP (ref 43–77)
NEUTS BAND # BLD: 0.9 % — SIGNIFICANT CHANGE UP (ref 0–8)
NT-PROBNP SERPL-SCNC: 146 PG/ML — SIGNIFICANT CHANGE UP (ref 0–300)
PCO2 BLDV: 57 MMHG — HIGH (ref 42–55)
PH BLDV: 7.46 — HIGH (ref 7.32–7.43)
PLAT MORPH BLD: NORMAL — SIGNIFICANT CHANGE UP
PLATELET # BLD AUTO: 73 K/UL — LOW (ref 150–400)
PO2 BLDV: 92 MMHG — HIGH (ref 25–45)
POTASSIUM BLDV-SCNC: 4.2 MMOL/L — SIGNIFICANT CHANGE UP (ref 3.5–5.1)
POTASSIUM SERPL-MCNC: 4.4 MMOL/L — SIGNIFICANT CHANGE UP (ref 3.5–5.3)
POTASSIUM SERPL-SCNC: 4.4 MMOL/L — SIGNIFICANT CHANGE UP (ref 3.5–5.3)
PROT SERPL-MCNC: 6.1 G/DL — LOW (ref 6.6–8.7)
PROTHROM AB SERPL-ACNC: 18.1 SEC — HIGH (ref 10.5–13.4)
RAPID RVP RESULT: SIGNIFICANT CHANGE UP
RBC # BLD: 2.44 M/UL — LOW (ref 4.2–5.8)
RBC # FLD: 14.1 % — SIGNIFICANT CHANGE UP (ref 10.3–14.5)
RBC BLD AUTO: NORMAL — SIGNIFICANT CHANGE UP
SAO2 % BLDV: 99.2 % — SIGNIFICANT CHANGE UP
SARS-COV-2 RNA SPEC QL NAA+PROBE: SIGNIFICANT CHANGE UP
SODIUM SERPL-SCNC: 129 MMOL/L — LOW (ref 135–145)
TROPONIN T SERPL-MCNC: <0.01 NG/ML — SIGNIFICANT CHANGE UP (ref 0–0.06)
WBC # BLD: 1.65 K/UL — LOW (ref 3.8–10.5)
WBC # FLD AUTO: 1.65 K/UL — LOW (ref 3.8–10.5)

## 2022-06-20 PROCEDURE — 93010 ELECTROCARDIOGRAM REPORT: CPT

## 2022-06-20 PROCEDURE — 71275 CT ANGIOGRAPHY CHEST: CPT | Mod: 26,MG

## 2022-06-20 PROCEDURE — 99285 EMERGENCY DEPT VISIT HI MDM: CPT

## 2022-06-20 PROCEDURE — G1004: CPT

## 2022-06-20 PROCEDURE — 99223 1ST HOSP IP/OBS HIGH 75: CPT

## 2022-06-20 RX ORDER — GABAPENTIN 400 MG/1
10 CAPSULE ORAL
Qty: 0 | Refills: 0 | DISCHARGE

## 2022-06-20 RX ORDER — MORPHINE SULFATE 50 MG/1
2 CAPSULE, EXTENDED RELEASE ORAL ONCE
Refills: 0 | Status: DISCONTINUED | OUTPATIENT
Start: 2022-06-20 | End: 2022-06-20

## 2022-06-20 RX ORDER — AZITHROMYCIN 500 MG/1
500 TABLET, FILM COATED ORAL ONCE
Refills: 0 | Status: COMPLETED | OUTPATIENT
Start: 2022-06-20 | End: 2022-06-20

## 2022-06-20 RX ORDER — OXYCODONE HYDROCHLORIDE 5 MG/1
5 TABLET ORAL EVERY 4 HOURS
Refills: 0 | Status: DISCONTINUED | OUTPATIENT
Start: 2022-06-20 | End: 2022-06-24

## 2022-06-20 RX ORDER — PIPERACILLIN AND TAZOBACTAM 4; .5 G/20ML; G/20ML
3.38 INJECTION, POWDER, LYOPHILIZED, FOR SOLUTION INTRAVENOUS EVERY 8 HOURS
Refills: 0 | Status: DISCONTINUED | OUTPATIENT
Start: 2022-06-21 | End: 2022-06-24

## 2022-06-20 RX ORDER — ACETAMINOPHEN 500 MG
650 TABLET ORAL EVERY 6 HOURS
Refills: 0 | Status: DISCONTINUED | OUTPATIENT
Start: 2022-06-20 | End: 2022-06-24

## 2022-06-20 RX ORDER — CEFTRIAXONE 500 MG/1
1000 INJECTION, POWDER, FOR SOLUTION INTRAMUSCULAR; INTRAVENOUS ONCE
Refills: 0 | Status: COMPLETED | OUTPATIENT
Start: 2022-06-20 | End: 2022-06-20

## 2022-06-20 RX ORDER — MORPHINE SULFATE 50 MG/1
320 CAPSULE, EXTENDED RELEASE ORAL DAILY
Refills: 0 | Status: DISCONTINUED | OUTPATIENT
Start: 2022-06-20 | End: 2022-06-20

## 2022-06-20 RX ORDER — GABAPENTIN 400 MG/1
500 CAPSULE ORAL THREE TIMES A DAY
Refills: 0 | Status: DISCONTINUED | OUTPATIENT
Start: 2022-06-20 | End: 2022-06-24

## 2022-06-20 RX ORDER — DIPHENHYDRAMINE HYDROCHLORIDE AND LIDOCAINE HYDROCHLORIDE AND ALUMINUM HYDROXIDE AND MAGNESIUM HYDRO
5 KIT
Qty: 0 | Refills: 0 | DISCHARGE

## 2022-06-20 RX ORDER — ACETAMINOPHEN 500 MG
650 TABLET ORAL ONCE
Refills: 0 | Status: DISCONTINUED | OUTPATIENT
Start: 2022-06-20 | End: 2022-06-20

## 2022-06-20 RX ORDER — OXYCODONE HYDROCHLORIDE 5 MG/1
1 TABLET ORAL
Qty: 0 | Refills: 0 | DISCHARGE

## 2022-06-20 RX ORDER — SODIUM CHLORIDE 9 MG/ML
2200 INJECTION, SOLUTION INTRAVENOUS ONCE
Refills: 0 | Status: COMPLETED | OUTPATIENT
Start: 2022-06-20 | End: 2022-06-20

## 2022-06-20 RX ORDER — FENTANYL CITRATE 50 UG/ML
1 INJECTION INTRAVENOUS
Qty: 0 | Refills: 0 | DISCHARGE

## 2022-06-20 RX ORDER — LANOLIN ALCOHOL/MO/W.PET/CERES
3 CREAM (GRAM) TOPICAL AT BEDTIME
Refills: 0 | Status: DISCONTINUED | OUTPATIENT
Start: 2022-06-20 | End: 2022-06-24

## 2022-06-20 RX ORDER — VANCOMYCIN HCL 1 G
1000 VIAL (EA) INTRAVENOUS ONCE
Refills: 0 | Status: COMPLETED | OUTPATIENT
Start: 2022-06-20 | End: 2022-06-20

## 2022-06-20 RX ORDER — OXYCODONE HYDROCHLORIDE 5 MG/1
5 TABLET ORAL EVERY 6 HOURS
Refills: 0 | Status: DISCONTINUED | OUTPATIENT
Start: 2022-06-20 | End: 2022-06-20

## 2022-06-20 RX ORDER — MONTELUKAST 4 MG/1
10 TABLET, CHEWABLE ORAL DAILY
Refills: 0 | Status: DISCONTINUED | OUTPATIENT
Start: 2022-06-20 | End: 2022-06-24

## 2022-06-20 RX ORDER — DIPHENHYDRAMINE HYDROCHLORIDE AND LIDOCAINE HYDROCHLORIDE AND ALUMINUM HYDROXIDE AND MAGNESIUM HYDRO
5 KIT EVERY 6 HOURS
Refills: 0 | Status: DISCONTINUED | OUTPATIENT
Start: 2022-06-20 | End: 2022-06-24

## 2022-06-20 RX ORDER — ACETAMINOPHEN 500 MG
650 TABLET ORAL ONCE
Refills: 0 | Status: COMPLETED | OUTPATIENT
Start: 2022-06-20 | End: 2022-06-20

## 2022-06-20 RX ORDER — ONDANSETRON 8 MG/1
4 TABLET, FILM COATED ORAL EVERY 8 HOURS
Refills: 0 | Status: DISCONTINUED | OUTPATIENT
Start: 2022-06-20 | End: 2022-06-24

## 2022-06-20 RX ORDER — MORPHINE SULFATE 50 MG/1
6 CAPSULE, EXTENDED RELEASE ORAL ONCE
Refills: 0 | Status: DISCONTINUED | OUTPATIENT
Start: 2022-06-20 | End: 2022-06-20

## 2022-06-20 RX ORDER — METOCLOPRAMIDE HCL 10 MG
1 TABLET ORAL
Qty: 0 | Refills: 0 | DISCHARGE

## 2022-06-20 RX ORDER — FLUTICASONE PROPIONATE 50 MCG
1 SPRAY, SUSPENSION NASAL
Qty: 0 | Refills: 0 | DISCHARGE

## 2022-06-20 RX ORDER — FLUTICASONE PROPIONATE AND SALMETEROL 50; 250 UG/1; UG/1
1 POWDER ORAL; RESPIRATORY (INHALATION)
Qty: 0 | Refills: 0 | DISCHARGE

## 2022-06-20 RX ORDER — MONTELUKAST 4 MG/1
1 TABLET, CHEWABLE ORAL
Qty: 0 | Refills: 0 | DISCHARGE

## 2022-06-20 RX ADMIN — AZITHROMYCIN 255 MILLIGRAM(S): 500 TABLET, FILM COATED ORAL at 15:47

## 2022-06-20 RX ADMIN — MORPHINE SULFATE 6 MILLIGRAM(S): 50 CAPSULE, EXTENDED RELEASE ORAL at 15:07

## 2022-06-20 RX ADMIN — Medication 250 MILLIGRAM(S): at 19:47

## 2022-06-20 RX ADMIN — Medication 650 MILLIGRAM(S): at 15:07

## 2022-06-20 RX ADMIN — SODIUM CHLORIDE 2200 MILLILITER(S): 9 INJECTION, SOLUTION INTRAVENOUS at 14:40

## 2022-06-20 RX ADMIN — MORPHINE SULFATE 2 MILLIGRAM(S): 50 CAPSULE, EXTENDED RELEASE ORAL at 20:59

## 2022-06-20 RX ADMIN — OXYCODONE HYDROCHLORIDE 5 MILLIGRAM(S): 5 TABLET ORAL at 23:31

## 2022-06-20 RX ADMIN — CEFTRIAXONE 100 MILLIGRAM(S): 500 INJECTION, POWDER, FOR SOLUTION INTRAMUSCULAR; INTRAVENOUS at 14:40

## 2022-06-20 RX ADMIN — GABAPENTIN 500 MILLIGRAM(S): 400 CAPSULE ORAL at 23:33

## 2022-06-20 RX ADMIN — MONTELUKAST 10 MILLIGRAM(S): 4 TABLET, CHEWABLE ORAL at 23:33

## 2022-06-20 NOTE — ED PROVIDER NOTE - NS ED ATTENDING STATEMENT MOD
This was a shared visit with the WISAM. I reviewed and verified the documentation and independently performed the documented: Attending with

## 2022-06-20 NOTE — ED ADULT TRIAGE NOTE - CHIEF COMPLAINT QUOTE
Pt BIBA from outpt radiation center for hypoxia and sob.  Pt went get radiation therapy for esophageal CA and states "I got there and think I had  a little panic attack and was sob but that's gone now."  As per ems, O2 was 90% on room air at center; pt 97% on RA on arrival to ED and denies all complaints

## 2022-06-20 NOTE — ED ADULT NURSE NOTE - OBJECTIVE STATEMENT
· HPI Objective Statement: 58 y/o M with PMHx throat / tongue cancer on Chemo/ Radiation, asthma, who presents to the ED from radiation center today for PT c/o sob.  Pt has throat cancer and was at radiation treatment today when he became anxious and felt sob.  Pulse ox was found to be 90% and staff called EMS.  Denies sob currently but c/o chronic painful cough r/t treatment.

## 2022-06-20 NOTE — PHARMACOTHERAPY INTERVENTION NOTE - COMMENTS
Spoke to patient and brother at bedside to obtain medication list. Outpatient Medication Review updated.    HOME MEDICATIONS:  fentaNYL 75 mcg/hr transdermal film, extended release: 1 film(s) transdermal every 72 hours (20 Jun 2022 20:20)  FIRST Mouthwash BLM mucous membrane suspension: 5 milliliter(s) mucous membrane 4 times a day, As Needed (20 Jun 2022 20:20)  Flonase 50 mcg/inh nasal spray: 1 spray(s) nasal once a day (20 Jun 2022 20:20)  gabapentin 250 mg/5 mL oral solution: 10 milliliter(s) orally 3 times a day (20 Jun 2022 20:20)  [employer] Tube Feeds: as directed (20 Jun 2022 20:20)  metoclopramide 10 mg oral tablet: 1 tab(s) orally 4 times a day (before meals and at bedtime) (20 Jun 2022 20:20)  oxyCODONE 5 mg oral tablet: 1 to 2 tabs every 4 to 6 hours as needed (20 Jun 2022 20:20)  Singulair 10 mg oral tablet: 1 tab(s) orally once a day PM (20 Jun 2022 20:20)

## 2022-06-20 NOTE — ED PROVIDER NOTE - PHYSICAL EXAMINATION
General: NAD  Head: NC, AT  EENT: no scleral icterus  Cardiac: RRR, no apparent murmurs, no lower extremity edema  Respiratory: rales b/l otherwise CTABL, no respiratory distress   Abdomen: soft, ND, NT, nonperitonitic  MSK/Vascular: soft compartments, warm extremities  Neuro: AAOx3, sensation to light touch intact  Psych: calm, cooperative

## 2022-06-20 NOTE — ED PROVIDER NOTE - OBJECTIVE STATEMENT
56 y/o M with PMHx throat / tongue cancer on Chemo/ Radiation, asthma, who presents to the ED from radiation center today for shortness of breath. Patient states that he was about to receive the radiation when he began to feel anxious at which point he informed the staff there and they took his pulse ox and it was 90% on RA so they sent him here. Patient denies any current shortness of breath. States that he normally has a productive cough, but denies any changes or new symptoms such as fevers, chills, NVD, abdominal pain, or dysuria. States that his oncologist is Dr. De Guzman. Denies any other complaints at this time.

## 2022-06-20 NOTE — ED ADULT NURSE REASSESSMENT NOTE - NS ED NURSE REASSESS COMMENT FT1
Assumed care of pt from previous RN. Pt presents to ED c/o SOB and cough. Hx tongue cancer, pt started experiencing SOB during his radiation appointment today. A/O x3. Respirations are even and unlabored. Pt is in no acute distress at this time. Pt educated on plan of care and expresses understanding.

## 2022-06-20 NOTE — ED PROVIDER NOTE - ATTENDING APP SHARED VISIT CONTRIBUTION OF CARE
56 y/o M with PMHx throat / tongue cancer on Chemo/ Radiation, asthma, who presents to the ED from radiation center today for shortness of breath. Patient states that he was about to receive the radiation when he began to feel anxious at which point he informed the staff there and they took his pulse ox and it was 90% on RA so they sent him here. Patient denies any current shortness of breath. States that he normally has a productive cough, but denies any changes or new symptoms such as fevers, chills, NVD, abdominal pain, or dysuria. States that his oncologist is Dr. De Guzman. Denies any other complaints at this time.    labs. ct, abx, admit

## 2022-06-20 NOTE — CONSULT NOTE ADULT - ASSESSMENT
INPROGRESS 58 y/o male with oropharyngeal/ BOT carcinoma s/p Cisplatin/ RT ( completed cisplatin on 6/15/22) / RT asthma, was sent to the ED from radiation center for shortness of breath. Patient said he did not get the radiation tx today, as the treatment was about to start, he started having difficulty breathing, staff checked his pulse ox: 90 on RA  CT chest angio: no PE, RLL PNA. Patient meet sepsis criteria on arrival (temp: 100.5 and leukopenia) with aspiration PNA. IVF given as per protocol     # Oropharyngeal BOT CA   # Pancytopenia 2/2 chemotherapy   # RLL PNA   # Hyponatremia     - On recephin azithromycin and vancomycin for Neutropenia/ PNA  - Hb at 7.5, transfuse with 1 unit PRBC   - Continue to monitor electrolytes, nephrology following Patient recently completed cisplatin on 6/15/22   - Monitor daily CBC/ CMP  Check Magnesium level

## 2022-06-20 NOTE — ED PROVIDER NOTE - NS ED ROS FT
Constitutional: no fever, no chills  Head: NC, AT   Eyes: no redness   ENMT: no nasal congestion/drainage, no sore throat   CV: no chest pain, no edema  Resp: + cough, no dyspnea  GI: no abdominal pain, no nausea, no vomiting, no diarrhea  : no dysuria, no hematuria   Skin: no lesions, no rashes   Neuro: no LOC, no headache, no sensory deficits, no weakness

## 2022-06-20 NOTE — H&P ADULT - ASSESSMENT
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 Patient said he did not get the radiation tx today, as the treatment was about to start, he started having difficulty breathing, staff checked his pulse ox: 90 on RA. Patient reported cough productive of clear/brown sputum sometimes with blood streak which he said is baseline for him; he has difficulty bringing up sputum.  CT chest angio: no PE, RLL PNA. Patient meet sepsis criteria on arrival (temp: 100.5 and leukopenia) with aspiration PNA. IVF given as per protocol     RLL PNA   Admit to medical floor with    Rocephin/azithromycin/Vancomycin given in the ED   Will continue with Zosyn   Aspiration precaution   Oxygen therapy as needed     Sepsis likely due to PNA   Temp: 100.5  WBC: 1.65 (partly might be due to chemo/radiation)   IVF given as per protocol   Blood culture x 2 sent follow up   Continue antibiotic     Hyponatremia   Na: 129  Likely due to dehydration   2L of LR given in the ED   Monitor BMP    Tongue ca SSC   Finished chemotherapy, now on radiation   Follows with oncology   Pain medication   Gabapentin 250mg/5ml 10ml tid   Fentanyl patch 75mg q 3 days (last placed yesterday)  Oxycodone 5mg q4-6 hours  Patient medication list indicated patient on Morphine 20mg/dl 16ml. As per brother, patient only took it for few day, no longer on it.   Zofran PRN for nausea/vomiting     Pancytopenia   Likely due to chemo/radiation therapy   Will continue antibiotic given leukopenia   Hb: 8.1 (patient mentioned occasional sputum with blood streak otherwise no GI bleed)  Monitor CBC     Supportive   DVT prophylaxis: will do CSD for now given low hb with blood streak sputum; change to chemical as needed   Diet: NPO for now given aspiration, will resume tube feed as tolerated     Plan of care discussed with patient

## 2022-06-20 NOTE — ED PROVIDER NOTE - PROGRESS NOTE DETAILS
Labwork s/f WBC 1.65, sodium 129, CT showing R L lobe likely aspiration pneumonia. Patient on abx, plan to admit for further management. - Hernandez

## 2022-06-20 NOTE — H&P ADULT - HISTORY OF PRESENT ILLNESS
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 Patient said he did not get the radiation tx today, as the treatment was about to start, he started having difficulty breathing, staff checked his pulse ox: 90 on RA. Patient reported cough productive of clear/brown sputum sometimes with blood streak which he said is baseline for him; he has difficulty bringing up sputum. He has no fever, chills, chest pain, palpitation, nausea, vomiting, abdominal pain, change in bowel/urinary habit.

## 2022-06-20 NOTE — H&P ADULT - NSHPPHYSICALEXAM_GEN_ALL_CORE
Vital Signs Last 24 Hrs  T(C): 36.9 (20 Jun 2022 19:24), Max: 38.1 (20 Jun 2022 13:06)  T(F): 98.4 (20 Jun 2022 19:24), Max: 100.5 (20 Jun 2022 13:06)  HR: 88 (20 Jun 2022 19:24) (88 - 95)  BP: 136/72 (20 Jun 2022 19:24) (112/59 - 136/72)  BP(mean): --  RR: 20 (20 Jun 2022 19:24) (17 - 20)  SpO2: 96% (20 Jun 2022 19:24) (94% - 98%)

## 2022-06-21 LAB
ANION GAP SERPL CALC-SCNC: 10 MMOL/L — SIGNIFICANT CHANGE UP (ref 5–17)
ANION GAP SERPL CALC-SCNC: 12 MMOL/L — SIGNIFICANT CHANGE UP (ref 5–17)
BUN SERPL-MCNC: 13.9 MG/DL — SIGNIFICANT CHANGE UP (ref 8–20)
BUN SERPL-MCNC: 14.4 MG/DL — SIGNIFICANT CHANGE UP (ref 8–20)
CALCIUM SERPL-MCNC: 7.9 MG/DL — LOW (ref 8.6–10.2)
CALCIUM SERPL-MCNC: 7.9 MG/DL — LOW (ref 8.6–10.2)
CHLORIDE SERPL-SCNC: 88 MMOL/L — LOW (ref 98–107)
CHLORIDE SERPL-SCNC: 89 MMOL/L — LOW (ref 98–107)
CO2 SERPL-SCNC: 29 MMOL/L — SIGNIFICANT CHANGE UP (ref 22–29)
CO2 SERPL-SCNC: 30 MMOL/L — HIGH (ref 22–29)
CREAT SERPL-MCNC: 0.41 MG/DL — LOW (ref 0.5–1.3)
CREAT SERPL-MCNC: 0.47 MG/DL — LOW (ref 0.5–1.3)
EGFR: 120 ML/MIN/1.73M2 — SIGNIFICANT CHANGE UP
EGFR: 126 ML/MIN/1.73M2 — SIGNIFICANT CHANGE UP
GLUCOSE SERPL-MCNC: 103 MG/DL — HIGH (ref 70–99)
GLUCOSE SERPL-MCNC: 104 MG/DL — HIGH (ref 70–99)
HCT VFR BLD CALC: 22.4 % — LOW (ref 39–50)
HGB BLD-MCNC: 7.5 G/DL — LOW (ref 13–17)
MCHC RBC-ENTMCNC: 31.8 PG — SIGNIFICANT CHANGE UP (ref 27–34)
MCHC RBC-ENTMCNC: 33.5 GM/DL — SIGNIFICANT CHANGE UP (ref 32–36)
MCV RBC AUTO: 94.9 FL — SIGNIFICANT CHANGE UP (ref 80–100)
PLATELET # BLD AUTO: 62 K/UL — LOW (ref 150–400)
POTASSIUM SERPL-MCNC: 3.8 MMOL/L — SIGNIFICANT CHANGE UP (ref 3.5–5.3)
POTASSIUM SERPL-MCNC: 4 MMOL/L — SIGNIFICANT CHANGE UP (ref 3.5–5.3)
POTASSIUM SERPL-SCNC: 3.8 MMOL/L — SIGNIFICANT CHANGE UP (ref 3.5–5.3)
POTASSIUM SERPL-SCNC: 4 MMOL/L — SIGNIFICANT CHANGE UP (ref 3.5–5.3)
RBC # BLD: 2.36 M/UL — LOW (ref 4.2–5.8)
RBC # FLD: 14.2 % — SIGNIFICANT CHANGE UP (ref 10.3–14.5)
SODIUM SERPL-SCNC: 127 MMOL/L — LOW (ref 135–145)
SODIUM SERPL-SCNC: 130 MMOL/L — LOW (ref 135–145)
WBC # BLD: 1.56 K/UL — LOW (ref 3.8–10.5)
WBC # FLD AUTO: 1.56 K/UL — LOW (ref 3.8–10.5)

## 2022-06-21 PROCEDURE — 99233 SBSQ HOSP IP/OBS HIGH 50: CPT

## 2022-06-21 PROCEDURE — 99223 1ST HOSP IP/OBS HIGH 75: CPT

## 2022-06-21 RX ORDER — INFLUENZA VIRUS VACCINE 15; 15; 15; 15 UG/.5ML; UG/.5ML; UG/.5ML; UG/.5ML
0.5 SUSPENSION INTRAMUSCULAR ONCE
Refills: 0 | Status: DISCONTINUED | OUTPATIENT
Start: 2022-06-21 | End: 2022-06-21

## 2022-06-21 RX ORDER — FENTANYL CITRATE 50 UG/ML
1 INJECTION INTRAVENOUS
Refills: 0 | Status: DISCONTINUED | OUTPATIENT
Start: 2022-06-22 | End: 2022-06-24

## 2022-06-21 RX ORDER — ALBUTEROL 90 UG/1
2 AEROSOL, METERED ORAL EVERY 6 HOURS
Refills: 0 | Status: DISCONTINUED | OUTPATIENT
Start: 2022-06-21 | End: 2022-06-24

## 2022-06-21 RX ADMIN — OXYCODONE HYDROCHLORIDE 5 MILLIGRAM(S): 5 TABLET ORAL at 15:57

## 2022-06-21 RX ADMIN — DIPHENHYDRAMINE HYDROCHLORIDE AND LIDOCAINE HYDROCHLORIDE AND ALUMINUM HYDROXIDE AND MAGNESIUM HYDRO 5 MILLILITER(S): KIT at 02:10

## 2022-06-21 RX ADMIN — OXYCODONE HYDROCHLORIDE 5 MILLIGRAM(S): 5 TABLET ORAL at 10:45

## 2022-06-21 RX ADMIN — Medication 650 MILLIGRAM(S): at 04:45

## 2022-06-21 RX ADMIN — GABAPENTIN 500 MILLIGRAM(S): 400 CAPSULE ORAL at 22:34

## 2022-06-21 RX ADMIN — GABAPENTIN 500 MILLIGRAM(S): 400 CAPSULE ORAL at 05:51

## 2022-06-21 RX ADMIN — OXYCODONE HYDROCHLORIDE 5 MILLIGRAM(S): 5 TABLET ORAL at 03:35

## 2022-06-21 RX ADMIN — PIPERACILLIN AND TAZOBACTAM 25 GRAM(S): 4; .5 INJECTION, POWDER, LYOPHILIZED, FOR SOLUTION INTRAVENOUS at 14:35

## 2022-06-21 RX ADMIN — MONTELUKAST 10 MILLIGRAM(S): 4 TABLET, CHEWABLE ORAL at 10:07

## 2022-06-21 RX ADMIN — Medication 200 MILLIGRAM(S): at 23:29

## 2022-06-21 RX ADMIN — GABAPENTIN 500 MILLIGRAM(S): 400 CAPSULE ORAL at 14:45

## 2022-06-21 RX ADMIN — OXYCODONE HYDROCHLORIDE 5 MILLIGRAM(S): 5 TABLET ORAL at 18:33

## 2022-06-21 RX ADMIN — Medication 1 TABLET(S): at 10:07

## 2022-06-21 RX ADMIN — OXYCODONE HYDROCHLORIDE 5 MILLIGRAM(S): 5 TABLET ORAL at 01:55

## 2022-06-21 RX ADMIN — PIPERACILLIN AND TAZOBACTAM 25 GRAM(S): 4; .5 INJECTION, POWDER, LYOPHILIZED, FOR SOLUTION INTRAVENOUS at 05:50

## 2022-06-21 RX ADMIN — DIPHENHYDRAMINE HYDROCHLORIDE AND LIDOCAINE HYDROCHLORIDE AND ALUMINUM HYDROXIDE AND MAGNESIUM HYDRO 5 MILLILITER(S): KIT at 17:43

## 2022-06-21 RX ADMIN — PIPERACILLIN AND TAZOBACTAM 25 GRAM(S): 4; .5 INJECTION, POWDER, LYOPHILIZED, FOR SOLUTION INTRAVENOUS at 22:34

## 2022-06-21 RX ADMIN — OXYCODONE HYDROCHLORIDE 5 MILLIGRAM(S): 5 TABLET ORAL at 10:06

## 2022-06-21 RX ADMIN — OXYCODONE HYDROCHLORIDE 5 MILLIGRAM(S): 5 TABLET ORAL at 04:54

## 2022-06-21 RX ADMIN — Medication 650 MILLIGRAM(S): at 03:35

## 2022-06-21 RX ADMIN — OXYCODONE HYDROCHLORIDE 5 MILLIGRAM(S): 5 TABLET ORAL at 22:34

## 2022-06-21 RX ADMIN — OXYCODONE HYDROCHLORIDE 5 MILLIGRAM(S): 5 TABLET ORAL at 14:36

## 2022-06-21 RX ADMIN — OXYCODONE HYDROCHLORIDE 5 MILLIGRAM(S): 5 TABLET ORAL at 18:48

## 2022-06-21 NOTE — PATIENT PROFILE ADULT - FALL HARM RISK - HARM RISK INTERVENTIONS

## 2022-06-21 NOTE — CONSULT NOTE ADULT - ASSESSMENT
Hyponatremia Hyponatremia  Metabolic Alkalsosis  SCC of Tongue  PNA    -Likely multifactorial hyponatremia  -Check Ur lytes  -Check UA  -Check Ur Osm  -Hold FWF, can restart TF

## 2022-06-21 NOTE — CONSULT NOTE ADULT - SUBJECTIVE AND OBJECTIVE BOX
HPI:  58 y/o male with oropharyngeal/ BOT carcinoma on chemo( completed on 6/15/22) / RT asthma, was sent to the ED from radiation center for shortness of breath. Patient said he did not get the radiation tx today, as the treatment was about to start, he started having difficulty breathing, staff checked his pulse ox: 90 on RA. Patient reported cough productive of clear/brown sputum sometimes with blood streak which he said is baseline for him; he has difficulty bringing up sputum. He has no fever, chills, chest pain, palpitation, nausea, vomiting, abdominal pain, change in bowel/urinary habit.      (2022 20:00)  22: WBC 1.65, ANC 1.22, Hb  Na 129, Cr 0.53  CT ANGIO:   No pulmonary embolism. Right lower lobe aspiration pneumonia.      REVIEW OF SYSTEMS:  Constitutional, Eyes, ENT, Cardiovascular, Respiratory, Gastrointestinal, Genitourinary, Musculoskeletal, Integumentary, Neurological, Psychiatric, Endocrine, Heme/Lymph, and Allergic/Immunologic review of systems are otherwise negative except as noted in the HPI.    PAST MEDICAL & SURGICAL HISTORY:  Anxiety      Asthma  hx of hospitalization 20 years ago.  presesntly Well controlled with present medication regimen      Neoplasm of base of tongue          FAMILY HISTORY:  FH: heart disease (Father)   at age 59        SOCIAL HISTORY:    Allergies    No Known Allergies    Intolerances        MEDICATIONS  (STANDING):  gabapentin Solution 500 milliGRAM(s) Enteral Tube three times a day  montelukast 10 milliGRAM(s) Oral daily  multivitamin 1 Tablet(s) Oral daily    MEDICATIONS  (PRN):  acetaminophen     Tablet .. 650 milliGRAM(s) Oral every 6 hours PRN Temp greater or equal to 38C (100.4F), Mild Pain (1 - 3)  aluminum hydroxide/magnesium hydroxide/simethicone Suspension 30 milliLiter(s) Oral every 4 hours PRN Dyspepsia  FIRST- Mouthwash  BLM 5 milliLiter(s) Swish and Swallow every 6 hours PRN Mouth Care  melatonin 3 milliGRAM(s) Oral at bedtime PRN Insomnia  ondansetron Injectable 4 milliGRAM(s) IV Push every 8 hours PRN Nausea and/or Vomiting  oxyCODONE    IR 5 milliGRAM(s) Oral every 6 hours PRN Severe Pain (7 - 10)      Vital Signs Last 24 Hrs  T(C): 36.9 (2022 19:24), Max: 38.1 (2022 13:06)  T(F): 98.4 (2022 19:24), Max: 100.5 (2022 13:06)  HR: 88 (2022 19:24) (88 - 95)  BP: 136/72 (2022 19:24) (112/59 - 136/72)  BP(mean): --  RR: 20 (2022 19:24) (17 - 20)  SpO2: 96% (2022 19:24) (94% - 98%)    PHYSICAL EXAM:    GENERAL: NAD, well-groomed, well-developed  HEAD:  Atraumatic, Normocephalic  EYES: EOMI, PERRLA, conjunctiva and sclera clear  ENMT: No tonsillar erythema, exudates, or enlargement; Moist mucous membranes, Good dentition, No lesions  NECK: Supple, No JVD, Normal thyroid  NERVOUS SYSTEM:  Alert & Oriented X3, Good concentration; Motor Strength 5/5 B/L upper and lower extremities; DTRs 2+ intact and symmetric  CHEST/LUNG: Clear to auscultation bilaterally; No rales, rhonchi, wheezing, or rubs  HEART: Regular rate and rhythm; No murmurs, rubs, or gallops  ABDOMEN: Soft, Nontender, Nondistended; Bowel sounds present  EXTREMITIES:  2+ Peripheral Pulses, No clubbing, cyanosis, or edema  LYMPH: No lymphadenopathy noted  SKIN: No rashes or lesions      LABS:                        8.1    1.65  )-----------( 73       ( 2022 14:38 )             23.5     06-20    129<L>  |  85<L>  |  18.6  ----------------------------<  112<H>  4.4   |  35.0<H>  |  0.53    Ca    8.2<L>      2022 14:38    TPro  6.1<L>  /  Alb  3.3  /  TBili  0.6  /  DBili  x   /  AST  59<H>  /  ALT  50<H>  /  AlkPhos  100  06-20    PT/INR - ( 2022 14:38 )   PT: 18.1 sec;   INR: 1.55 ratio         PTT - ( 2022 14:38 )  PTT:25.3 sec        RADIOLOGY & ADDITIONAL STUDIES:    PATHOLOGY:    
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 Patient said he did not get the radiation tx today, as the treatment was about to start, he started having difficulty breathing, staff checked his pulse ox: 90 on RA. Patient reported cough productive of clear/brown sputum sometimes with blood streak which he said is baseline for him; he has difficulty bringing up sputum. He has no fever, chills, chest pain, palpitation, nausea, vomiting, abdominal pain, change in bowel/urinary habit.   States he is urinating well.  Has been off oral feedings for approx 1 month.  Takes TF + FWF.           PAST MEDICAL & SURGICAL HISTORY:  Anxiety      Asthma  hx of hospitalization 20 years ago.  presesntly Well controlled with present medication regimen      Neoplasm of base of tongue           FAMILY HISTORY:  FH: heart disease (Father)   at age 59    NC    Social History:Non smoker    MEDICATIONS  (STANDING):  gabapentin Solution 500 milliGRAM(s) Enteral Tube three times a day  guaiFENesin  milliGRAM(s) Oral every 12 hours  montelukast 10 milliGRAM(s) Oral daily  multivitamin 1 Tablet(s) Oral daily  piperacillin/tazobactam IVPB.. 3.375 Gram(s) IV Intermittent every 8 hours    MEDICATIONS  (PRN):  acetaminophen     Tablet .. 650 milliGRAM(s) Oral every 6 hours PRN Temp greater or equal to 38C (100.4F), Mild Pain (1 - 3)  aluminum hydroxide/magnesium hydroxide/simethicone Suspension 30 milliLiter(s) Oral every 4 hours PRN Dyspepsia  FIRST- Mouthwash  BLM 5 milliLiter(s) Swish and Swallow every 6 hours PRN Mouth Care  melatonin 3 milliGRAM(s) Oral at bedtime PRN Insomnia  ondansetron Injectable 4 milliGRAM(s) IV Push every 8 hours PRN Nausea and/or Vomiting  oxyCODONE    IR 5 milliGRAM(s) Oral every 4 hours PRN Severe Pain (7 - 10)   Meds reviewed    Allergies    No Known Allergies    Intolerances         REVIEW OF SYSTEMS:    Review of Systems:   Constitutional: Denies fatigue  HEENT: Denies headaches and dizziness  Respiratory: denies SOB, +cough  Cardiovascular: denies CP, palpitations  Gastrointestinal: Denies nausea, denies vomiting, diarrhea, constipation, abdominal pain, or bloody stools  Genitourinary: denies painful urination, increased frequency, urgency, or bloody urine  Skin: denies rashes or itching  Musculoskeletal: denies muscle aches, joint swelling  Neurologic: Denies generalized weakness, denies loss of sensation, numbness, or tingling      Vital Signs Last 24 Hrs  T(C): 37 (2022 11:16), Max: 37.3 (2022 22:22)  T(F): 98.6 (2022 11:16), Max: 99.1 (2022 22:22)  HR: 81 (2022 11:16) (81 - 98)  BP: 124/70 (2022 11:16) (112/59 - 136/72)  BP(mean): --  RR: 18 (2022 11:16) (18 - 20)  SpO2: 94% (2022 11:16) (93% - 96%)  Daily     Daily     PHYSICAL EXAM:    GENERAL: NAD  HEAD:  Atraumatic, Normocephalic  EYES: EOMI, conjunctiva and sclera clear  ENMT: No Drainage from nares, No drainage from ears  NECK: Supple, neck  veins full  NERVOUS SYSTEM:  Awake and Alert  CHEST/LUNG: Clear to percussion bilaterally; No rales, rhonchi, wheezing, or rubs  HEART: Regular rate and rhythm; No murmurs, rubs, or gallops  ABDOMEN: Soft, Nontender, Nondistended; Bowel sounds present  EXTREMITIES:  No Edema  SKIN: No rashes No obvious ecchymosis      LABS:                        7.5    1.56  )-----------( 62       ( 2022 03:01 )             22.4     06-21    130<L>  |  89<L>  |  13.9  ----------------------------<  103<H>  4.0   |  30.0<H>  |  0.47<L>    Ca    7.9<L>      2022 03:01    TPro  6.1<L>  /  Alb  3.3  /  TBili  0.6  /  DBili  x   /  AST  59<H>  /  ALT  50<H>  /  AlkPhos  100  06-20    PT/INR - ( 2022 14:38 )   PT: 18.1 sec;   INR: 1.55 ratio         PTT - ( 2022 14:38 )  PTT:25.3 sec            RADIOLOGY & ADDITIONAL TESTS:

## 2022-06-21 NOTE — CHART NOTE - NSCHARTNOTEFT_GEN_A_CORE
follow up on sign out  Serum Na 129 on admission today    06-20    127<L>  |  88<L>  |  14.4  ----------------------------<  104<H>  3.8   |  29.0  |  0.41<L>    Ca    7.9<L>      20 Jun 2022 23:42    TPro  6.1<L>  /  Alb  3.3  /  TBili  0.6  /  DBili  x   /  AST  59<H>  /  ALT  50<H>  /  AlkPhos  100  06-20    Pt is s/p 2 liters LR since admission    Impression: 1- hyponatremia  Plan: 1- fluid restriction          2- nephrology consult in a.m. - called           3- case d/w Dr. Li

## 2022-06-22 LAB
ALBUMIN SERPL ELPH-MCNC: 3.1 G/DL — LOW (ref 3.3–5.2)
ALP SERPL-CCNC: 90 U/L — SIGNIFICANT CHANGE UP (ref 40–120)
ALT FLD-CCNC: 72 U/L — HIGH
ANION GAP SERPL CALC-SCNC: 11 MMOL/L — SIGNIFICANT CHANGE UP (ref 5–17)
ANISOCYTOSIS BLD QL: SLIGHT — SIGNIFICANT CHANGE UP
APPEARANCE UR: CLEAR — SIGNIFICANT CHANGE UP
AST SERPL-CCNC: 84 U/L — HIGH
BACTERIA # UR AUTO: NEGATIVE — SIGNIFICANT CHANGE UP
BASOPHILS # BLD AUTO: 0 K/UL — SIGNIFICANT CHANGE UP (ref 0–0.2)
BASOPHILS NFR BLD AUTO: 0 % — SIGNIFICANT CHANGE UP (ref 0–2)
BILIRUB SERPL-MCNC: 0.6 MG/DL — SIGNIFICANT CHANGE UP (ref 0.4–2)
BILIRUB UR-MCNC: NEGATIVE — SIGNIFICANT CHANGE UP
BUN SERPL-MCNC: 13.2 MG/DL — SIGNIFICANT CHANGE UP (ref 8–20)
CALCIUM SERPL-MCNC: 8.4 MG/DL — LOW (ref 8.6–10.2)
CHLORIDE SERPL-SCNC: 88 MMOL/L — LOW (ref 98–107)
CO2 SERPL-SCNC: 32 MMOL/L — HIGH (ref 22–29)
COLOR SPEC: YELLOW — SIGNIFICANT CHANGE UP
CREAT SERPL-MCNC: 0.53 MG/DL — SIGNIFICANT CHANGE UP (ref 0.5–1.3)
DIFF PNL FLD: NEGATIVE — SIGNIFICANT CHANGE UP
EGFR: 116 ML/MIN/1.73M2 — SIGNIFICANT CHANGE UP
EOSINOPHIL # BLD AUTO: 0 K/UL — SIGNIFICANT CHANGE UP (ref 0–0.5)
EOSINOPHIL NFR BLD AUTO: 0 % — SIGNIFICANT CHANGE UP (ref 0–6)
EPI CELLS # UR: SIGNIFICANT CHANGE UP
GLUCOSE BLDC GLUCOMTR-MCNC: 146 MG/DL — HIGH (ref 70–99)
GLUCOSE SERPL-MCNC: 100 MG/DL — HIGH (ref 70–99)
GLUCOSE UR QL: NEGATIVE MG/DL — SIGNIFICANT CHANGE UP
GRAM STN FLD: SIGNIFICANT CHANGE UP
HCT VFR BLD CALC: 22.6 % — LOW (ref 39–50)
HGB BLD-MCNC: 7.8 G/DL — LOW (ref 13–17)
KETONES UR-MCNC: NEGATIVE — SIGNIFICANT CHANGE UP
LEUKOCYTE ESTERASE UR-ACNC: NEGATIVE — SIGNIFICANT CHANGE UP
LYMPHOCYTES # BLD AUTO: 0.18 K/UL — LOW (ref 1–3.3)
LYMPHOCYTES # BLD AUTO: 10 % — LOW (ref 13–44)
MACROCYTES BLD QL: SLIGHT — SIGNIFICANT CHANGE UP
MANUAL SMEAR VERIFICATION: SIGNIFICANT CHANGE UP
MCHC RBC-ENTMCNC: 32.9 PG — SIGNIFICANT CHANGE UP (ref 27–34)
MCHC RBC-ENTMCNC: 34.5 GM/DL — SIGNIFICANT CHANGE UP (ref 32–36)
MCV RBC AUTO: 95.4 FL — SIGNIFICANT CHANGE UP (ref 80–100)
MICROCYTES BLD QL: SLIGHT — SIGNIFICANT CHANGE UP
MONOCYTES # BLD AUTO: 0.21 K/UL — SIGNIFICANT CHANGE UP (ref 0–0.9)
MONOCYTES NFR BLD AUTO: 12 % — SIGNIFICANT CHANGE UP (ref 2–14)
NEUTROPHILS # BLD AUTO: 1.36 K/UL — LOW (ref 1.8–7.4)
NEUTROPHILS NFR BLD AUTO: 74 % — SIGNIFICANT CHANGE UP (ref 43–77)
NEUTS BAND # BLD: 3 % — SIGNIFICANT CHANGE UP (ref 0–8)
NITRITE UR-MCNC: NEGATIVE — SIGNIFICANT CHANGE UP
NRBC # BLD: 0 /100 — SIGNIFICANT CHANGE UP (ref 0–0)
OSMOLALITY SERPL: 274 MOSMOL/KG — LOW (ref 275–300)
OVALOCYTES BLD QL SMEAR: SLIGHT — SIGNIFICANT CHANGE UP
PH UR: 8 — SIGNIFICANT CHANGE UP (ref 5–8)
PLAT MORPH BLD: NORMAL — SIGNIFICANT CHANGE UP
PLATELET # BLD AUTO: 63 K/UL — LOW (ref 150–400)
POIKILOCYTOSIS BLD QL AUTO: SLIGHT — SIGNIFICANT CHANGE UP
POTASSIUM SERPL-MCNC: 4.1 MMOL/L — SIGNIFICANT CHANGE UP (ref 3.5–5.3)
POTASSIUM SERPL-SCNC: 4.1 MMOL/L — SIGNIFICANT CHANGE UP (ref 3.5–5.3)
PROT SERPL-MCNC: 5.9 G/DL — LOW (ref 6.6–8.7)
PROT UR-MCNC: NEGATIVE — SIGNIFICANT CHANGE UP
RBC # BLD: 2.37 M/UL — LOW (ref 4.2–5.8)
RBC # FLD: 14.1 % — SIGNIFICANT CHANGE UP (ref 10.3–14.5)
RBC BLD AUTO: ABNORMAL
RBC CASTS # UR COMP ASSIST: NEGATIVE /HPF — SIGNIFICANT CHANGE UP (ref 0–4)
SODIUM SERPL-SCNC: 131 MMOL/L — LOW (ref 135–145)
SP GR SPEC: 1.01 — SIGNIFICANT CHANGE UP (ref 1.01–1.02)
SPECIMEN SOURCE: SIGNIFICANT CHANGE UP
URATE SERPL-MCNC: 1.7 MG/DL — LOW (ref 3.4–7)
UROBILINOGEN FLD QL: 1 MG/DL
VARIANT LYMPHS # BLD: 1 % — SIGNIFICANT CHANGE UP (ref 0–6)
WBC # BLD: 1.77 K/UL — LOW (ref 3.8–10.5)
WBC # FLD AUTO: 1.77 K/UL — LOW (ref 3.8–10.5)
WBC UR QL: SIGNIFICANT CHANGE UP /HPF (ref 0–5)

## 2022-06-22 PROCEDURE — 99233 SBSQ HOSP IP/OBS HIGH 50: CPT

## 2022-06-22 RX ADMIN — Medication 200 MILLIGRAM(S): at 06:10

## 2022-06-22 RX ADMIN — GABAPENTIN 500 MILLIGRAM(S): 400 CAPSULE ORAL at 06:12

## 2022-06-22 RX ADMIN — PIPERACILLIN AND TAZOBACTAM 25 GRAM(S): 4; .5 INJECTION, POWDER, LYOPHILIZED, FOR SOLUTION INTRAVENOUS at 06:09

## 2022-06-22 RX ADMIN — OXYCODONE HYDROCHLORIDE 5 MILLIGRAM(S): 5 TABLET ORAL at 00:00

## 2022-06-22 RX ADMIN — Medication 1 TABLET(S): at 13:34

## 2022-06-22 RX ADMIN — PIPERACILLIN AND TAZOBACTAM 25 GRAM(S): 4; .5 INJECTION, POWDER, LYOPHILIZED, FOR SOLUTION INTRAVENOUS at 21:51

## 2022-06-22 RX ADMIN — PIPERACILLIN AND TAZOBACTAM 25 GRAM(S): 4; .5 INJECTION, POWDER, LYOPHILIZED, FOR SOLUTION INTRAVENOUS at 13:32

## 2022-06-22 RX ADMIN — GABAPENTIN 500 MILLIGRAM(S): 400 CAPSULE ORAL at 14:54

## 2022-06-22 RX ADMIN — GABAPENTIN 500 MILLIGRAM(S): 400 CAPSULE ORAL at 21:51

## 2022-06-22 RX ADMIN — FENTANYL CITRATE 1 PATCH: 50 INJECTION INTRAVENOUS at 14:54

## 2022-06-22 RX ADMIN — OXYCODONE HYDROCHLORIDE 5 MILLIGRAM(S): 5 TABLET ORAL at 06:10

## 2022-06-22 RX ADMIN — MONTELUKAST 10 MILLIGRAM(S): 4 TABLET, CHEWABLE ORAL at 13:33

## 2022-06-23 LAB
ALBUMIN SERPL ELPH-MCNC: 3.1 G/DL — LOW (ref 3.3–5.2)
ALP SERPL-CCNC: 93 U/L — SIGNIFICANT CHANGE UP (ref 40–120)
ALT FLD-CCNC: 79 U/L — HIGH
ANION GAP SERPL CALC-SCNC: 10 MMOL/L — SIGNIFICANT CHANGE UP (ref 5–17)
AST SERPL-CCNC: 69 U/L — HIGH
BASOPHILS # BLD AUTO: 0 K/UL — SIGNIFICANT CHANGE UP (ref 0–0.2)
BASOPHILS NFR BLD AUTO: 0 % — SIGNIFICANT CHANGE UP (ref 0–2)
BILIRUB SERPL-MCNC: 0.4 MG/DL — SIGNIFICANT CHANGE UP (ref 0.4–2)
BUN SERPL-MCNC: 11.7 MG/DL — SIGNIFICANT CHANGE UP (ref 8–20)
CALCIUM SERPL-MCNC: 8.2 MG/DL — LOW (ref 8.6–10.2)
CHLORIDE SERPL-SCNC: 89 MMOL/L — LOW (ref 98–107)
CO2 SERPL-SCNC: 32 MMOL/L — HIGH (ref 22–29)
CREAT SERPL-MCNC: 0.5 MG/DL — SIGNIFICANT CHANGE UP (ref 0.5–1.3)
CULTURE RESULTS: NO GROWTH — SIGNIFICANT CHANGE UP
EGFR: 118 ML/MIN/1.73M2 — SIGNIFICANT CHANGE UP
EOSINOPHIL # BLD AUTO: 0 K/UL — SIGNIFICANT CHANGE UP (ref 0–0.5)
EOSINOPHIL NFR BLD AUTO: 0 % — SIGNIFICANT CHANGE UP (ref 0–6)
GIANT PLATELETS BLD QL SMEAR: PRESENT — SIGNIFICANT CHANGE UP
GLUCOSE SERPL-MCNC: 116 MG/DL — HIGH (ref 70–99)
HCT VFR BLD CALC: 22.4 % — LOW (ref 39–50)
HGB BLD-MCNC: 7.6 G/DL — LOW (ref 13–17)
LYMPHOCYTES # BLD AUTO: 0.11 K/UL — LOW (ref 1–3.3)
LYMPHOCYTES # BLD AUTO: 6.1 % — LOW (ref 13–44)
MANUAL SMEAR VERIFICATION: SIGNIFICANT CHANGE UP
MCHC RBC-ENTMCNC: 32.6 PG — SIGNIFICANT CHANGE UP (ref 27–34)
MCHC RBC-ENTMCNC: 33.9 GM/DL — SIGNIFICANT CHANGE UP (ref 32–36)
MCV RBC AUTO: 96.1 FL — SIGNIFICANT CHANGE UP (ref 80–100)
MONOCYTES # BLD AUTO: 0.16 K/UL — SIGNIFICANT CHANGE UP (ref 0–0.9)
MONOCYTES NFR BLD AUTO: 8.8 % — SIGNIFICANT CHANGE UP (ref 2–14)
NEUTROPHILS # BLD AUTO: 1.51 K/UL — LOW (ref 1.8–7.4)
NEUTROPHILS NFR BLD AUTO: 83.3 % — HIGH (ref 43–77)
PLAT MORPH BLD: NORMAL — SIGNIFICANT CHANGE UP
PLATELET # BLD AUTO: 63 K/UL — LOW (ref 150–400)
POLYCHROMASIA BLD QL SMEAR: SIGNIFICANT CHANGE UP
POTASSIUM SERPL-MCNC: 4.5 MMOL/L — SIGNIFICANT CHANGE UP (ref 3.5–5.3)
POTASSIUM SERPL-SCNC: 4.5 MMOL/L — SIGNIFICANT CHANGE UP (ref 3.5–5.3)
PROT SERPL-MCNC: 5.7 G/DL — LOW (ref 6.6–8.7)
RBC # BLD: 2.33 M/UL — LOW (ref 4.2–5.8)
RBC # FLD: 14.4 % — SIGNIFICANT CHANGE UP (ref 10.3–14.5)
RBC BLD AUTO: ABNORMAL
SODIUM SERPL-SCNC: 130 MMOL/L — LOW (ref 135–145)
SPECIMEN SOURCE: SIGNIFICANT CHANGE UP
VARIANT LYMPHS # BLD: 1.8 % — SIGNIFICANT CHANGE UP (ref 0–6)
WBC # BLD: 1.81 K/UL — LOW (ref 3.8–10.5)
WBC # FLD AUTO: 1.81 K/UL — LOW (ref 3.8–10.5)

## 2022-06-23 PROCEDURE — 99232 SBSQ HOSP IP/OBS MODERATE 35: CPT

## 2022-06-23 RX ADMIN — GABAPENTIN 500 MILLIGRAM(S): 400 CAPSULE ORAL at 22:06

## 2022-06-23 RX ADMIN — PIPERACILLIN AND TAZOBACTAM 25 GRAM(S): 4; .5 INJECTION, POWDER, LYOPHILIZED, FOR SOLUTION INTRAVENOUS at 06:19

## 2022-06-23 RX ADMIN — PIPERACILLIN AND TAZOBACTAM 25 GRAM(S): 4; .5 INJECTION, POWDER, LYOPHILIZED, FOR SOLUTION INTRAVENOUS at 13:20

## 2022-06-23 RX ADMIN — GABAPENTIN 500 MILLIGRAM(S): 400 CAPSULE ORAL at 06:19

## 2022-06-23 RX ADMIN — Medication 1 TABLET(S): at 13:15

## 2022-06-23 RX ADMIN — PIPERACILLIN AND TAZOBACTAM 25 GRAM(S): 4; .5 INJECTION, POWDER, LYOPHILIZED, FOR SOLUTION INTRAVENOUS at 22:06

## 2022-06-23 RX ADMIN — MONTELUKAST 10 MILLIGRAM(S): 4 TABLET, CHEWABLE ORAL at 13:16

## 2022-06-23 RX ADMIN — GABAPENTIN 500 MILLIGRAM(S): 400 CAPSULE ORAL at 14:40

## 2022-06-23 NOTE — DIETITIAN NUTRITION RISK NOTIFICATION - TREATMENT: THE FOLLOWING DIET HAS BEEN RECOMMENDED
Diet, NPO with Tube Feed:   Tube Feeding Modality: Gastrostomy  Vital 1.5 Osvaldo (VITAL1.5)  Continuous  Starting Tube Feed Rate {mL per Hour}: 10  Increase Tube Feed Rate by (mL): 10     Every 2 hours  Until Goal Tube Feed Rate (mL per Hour): 50  Tube Feed Duration (in Hours): 24  Tube Feed Start Time: 12:30  Free Water Flush   Total Volume per Flush (mL): 250   Frequency: Every 6 Hours (06-21-22 @ 12:22) [Active]

## 2022-06-23 NOTE — PROGRESS NOTE ADULT - ASSESSMENT
58 y/o male with oropharyngeal/ BOT carcinoma s/p Cisplatin/ RT ( completed cisplatin on 6/15/22) / RT asthma, was sent to the ED from radiation center for shortness of breath. Patient said he did not get the radiation tx today, as the treatment was about to start, he started having difficulty breathing, staff checked his pulse ox: 90 on RA  CT chest angio: no PE, RLL PNA. Patient meet sepsis criteria on arrival (temp: 100.5 and leukopenia) with aspiration PNA. IVF given as per protocol     # Oropharyngeal BOT CA   # Pancytopenia 2/2 chemotherapy   # RLL PNA   # Hyponatremia     - received recephin azithromycin and vancomycin for Neutropenia/ PNA, in the er now on zoyn  - Hb at 7.8 this am, ANC1.3  - Continue to monitor electrolytes, nephrology following Patient recently completed cisplatin on 6/15/22   - Monitor daily CBC/ CMP  Check Magnesium level 
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 Patient said he did not get the radiation tx today, as the treatment was about to start, he started having difficulty breathing, staff checked his pulse ox: 90 on RA. Patient reported cough productive of clear/brown sputum sometimes with blood streak which he said is baseline for him; he has difficulty bringing up sputum.  CT chest angio: no PE, RLL PNA. Patient meet sepsis criteria on arrival (temp: 100.5 and leukopenia) with aspiration PNA. IVF given as per protocol     RLL PNA   Medical floor with    Rocephin/azithromycin/Vancomycin given in the ED   Will continue with Zosyn   Aspiration precaution   Oxygen therapy as needed     Sepsis likely due to PNA   Temp: 100.5  WBC: 1.65 (partly might be due to chemo/radiation)   IVF given as per protocol   Blood culture x 2 sent follow up   Continue antibiotic     Hyponatremia   Monitor BMP  Renal recs appreciated    Tongue ca SSC   Finished chemotherapy, now on radiation   Follows with oncology   Pain medication   Gabapentin 250mg/5ml 10ml tid   Fentanyl patch 75mg q 3 days (last placed yesterday)  Oxycodone 5mg q4-6 hours  Patient medication list indicated patient on Morphine 20mg/dl 16ml. As per brother, patient only took it for few day, no longer on it.   Zofran PRN for nausea/vomiting     Pancytopenia   Likely due to chemo/radiation therapy   Will continue antibiotic given leukopenia   Monitor CBC     Supportive   DVT prophylaxis: will do CSD for now given low hb with blood streak sputum; change to chemical as needed   Diet: NPO with tube feeds  Plan of care discussed with patient and sister Linda    Dispo: Pending course - Possible DC in 48 hours    
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 Patient said he did not get the radiation tx today, as the treatment was about to start, he started having difficulty breathing, staff checked his pulse ox: 90 on RA. Patient reported cough productive of clear/brown sputum sometimes with blood streak which he said is baseline for him; he has difficulty bringing up sputum.  CT chest angio: no PE, RLL PNA. Patient meet sepsis criteria on arrival (temp: 100.5 and leukopenia) with aspiration PNA. IVF given as per protocol     RLL PNA   Admit to medical floor with    Rocephin/azithromycin/Vancomycin given in the ED   Will continue with Zosyn   Aspiration precaution   Oxygen therapy as needed     Sepsis likely due to PNA   Temp: 100.5  WBC: 1.65 (partly might be due to chemo/radiation)   IVF given as per protocol   Blood culture x 2 sent follow up   Continue antibiotic     Hyponatremia   Monitor BMP  Renal recs appreciated    Tongue ca SSC   Finished chemotherapy, now on radiation   Follows with oncology   Pain medication   Gabapentin 250mg/5ml 10ml tid   Fentanyl patch 75mg q 3 days (last placed yesterday)  Oxycodone 5mg q4-6 hours  Patient medication list indicated patient on Morphine 20mg/dl 16ml. As per brother, patient only took it for few day, no longer on it.   Zofran PRN for nausea/vomiting     Pancytopenia   Likely due to chemo/radiation therapy   Will continue antibiotic given leukopenia   Monitor CBC     Supportive   DVT prophylaxis: will do CSD for now given low hb with blood streak sputum; change to chemical as needed   Diet: NPO with tube feeds  Plan of care discussed with patient     
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 Patient said he did not get the radiation tx today, as the treatment was about to start, he started having difficulty breathing, staff checked his pulse ox: 90 on RA. Patient reported cough productive of clear/brown sputum sometimes with blood streak which he said is baseline for him; he has difficulty bringing up sputum.  CT chest angio: no PE, RLL PNA. Patient meet sepsis criteria on arrival (temp: 100.5 and leukopenia) with aspiration PNA. IVF given as per protocol     RLL PNA   Medical floor with    Rocephin/azithromycin/Vancomycin given in the ED   Will continue with Zosyn   Aspiration precaution   Oxygen therapy as needed     Sepsis likely due to PNA   Temp: 100.5  WBC: 1.65 (partly might be due to chemo/radiation)   IVF given as per protocol   Blood culture x 2 with NGTD  Sputum cx   Continue antibiotic     Hyponatremia   Monitor BMP  Renal recs appreciated    Tongue ca SSC   Finished chemotherapy, now on radiation   Follows with oncology   Pain medication   Gabapentin 250mg/5ml 10ml tid   Fentanyl patch 75mg q 3 days (last placed yesterday)  Oxycodone 5mg q4-6 hours  Patient medication list indicated patient on Morphine 20mg/dl 16ml. As per brother, patient only took it for few day, no longer on it.   Zofran PRN for nausea/vomiting     Pancytopenia   Likely due to chemo/radiation therapy   Will continue antibiotic given leukopenia   Monitor CBC     Supportive   DVT prophylaxis: will do CSD for now given low hb with blood streak sputum; change to chemical as needed   Diet: NPO with tube feeds  Plan of care discussed with patient and sister Linda    Dispo: DC in AM    
Hyponatremia  Metabolic Alkalsosis  SCC of Tongue  PNA    -Likely multifactorial hyponatremia  -Check urine studies - still pending; reordered  -Continue with TF and FW 250cc q6hrs  -Sodium is improving well, now > 130; acceptable range  -Stable renal function    Thank you
Hyponatremia  Metabolic Alkalsosis  SCC of Tongue  PNA    -Likely multifactorial hyponatremia  -Check urine studies - still pending; will reorder  -Continue with TF and FW 250cc q6hrs  -Sodium is improving well, now > 130; acceptable range  -Stable renal function    Thank you
21-May-2021 17:59

## 2022-06-23 NOTE — DIETITIAN INITIAL EVALUATION ADULT - ORAL INTAKE PTA/DIET HISTORY
Pt with difficulty speaking secondary to history of tongue cancer; however was able to communicate via pen and paper.   Pt reports being unable to take PO; PEG in place; takes bolus feeds of Vital HN at home. Pt endorses over 20lb weight loss over past few months; however has been stable. Pt is currently tolerating continues feeds without discomfort.

## 2022-06-23 NOTE — DIETITIAN INITIAL EVALUATION ADULT - SIGNS/SYMPTOMS
as evidenced by meeting < 75% est needs > 1 month, physical signs of severe muscle mass and fat loss

## 2022-06-23 NOTE — DIETITIAN INITIAL EVALUATION ADULT - PERTINENT LABORATORY DATA
06-23    130<L>  |  89<L>  |  11.7  ----------------------------<  116<H>  4.5   |  32.0<H>  |  0.50    Ca    8.2<L>      23 Jun 2022 02:50    TPro  5.7<L>  /  Alb  3.1<L>  /  TBili  0.4  /  DBili  x   /  AST  69<H>  /  ALT  79<H>  /  AlkPhos  93  06-23  POCT Blood Glucose.: 146 mg/dL (06-22-22 @ 17:10)

## 2022-06-23 NOTE — DIETITIAN INITIAL EVALUATION ADULT - PERTINENT MEDS FT
MEDICATIONS  (STANDING):  fentaNYL   Patch  75 MICROgram(s)/Hr 1 Patch Transdermal every 72 hours  gabapentin Solution 500 milliGRAM(s) Enteral Tube three times a day  montelukast 10 milliGRAM(s) Oral daily  multivitamin 1 Tablet(s) Oral daily  piperacillin/tazobactam IVPB.. 3.375 Gram(s) IV Intermittent every 8 hours    MEDICATIONS  (PRN):  acetaminophen     Tablet .. 650 milliGRAM(s) Oral every 6 hours PRN Temp greater or equal to 38C (100.4F), Mild Pain (1 - 3)  ALBUTerol    90 MICROgram(s) HFA Inhaler 2 Puff(s) Inhalation every 6 hours PRN Bronchospasm  aluminum hydroxide/magnesium hydroxide/simethicone Suspension 30 milliLiter(s) Oral every 4 hours PRN Dyspepsia  FIRST- Mouthwash  BLM 5 milliLiter(s) Swish and Swallow every 6 hours PRN Mouth Care  guaiFENesin Oral Liquid (Sugar-Free) 200 milliGRAM(s) Oral every 6 hours PRN Cough  melatonin 3 milliGRAM(s) Oral at bedtime PRN Insomnia  ondansetron Injectable 4 milliGRAM(s) IV Push every 8 hours PRN Nausea and/or Vomiting  oxyCODONE    IR 5 milliGRAM(s) Oral every 4 hours PRN Severe Pain (7 - 10)

## 2022-06-23 NOTE — DIETITIAN INITIAL EVALUATION ADULT - ETIOLOGY
Related to inability to meet sufficient protein energy requirements in setting of Tongue ca SSC, on chemo and radiation

## 2022-06-23 NOTE — PROGRESS NOTE ADULT - SUBJECTIVE AND OBJECTIVE BOX
Hospitalist Daily Progress Note    Chief Complaint:  Patient is a 57y old  Male who presents with a chief complaint of PNA (2022 13:27)      SUBJECTIVE / OVERNIGHT EVENTS:  Patient was seen and examined at bedside. States to be slowly improving but not at his baseline yet.   Patient denies chest pain, SOB, abd pain, N/V, fever, chills, dysuria or any other complaints. All remainder ROS negative.     MEDICATIONS  (STANDING):  fentaNYL   Patch  75 MICROgram(s)/Hr 1 Patch Transdermal every 72 hours  gabapentin Solution 500 milliGRAM(s) Enteral Tube three times a day  montelukast 10 milliGRAM(s) Oral daily  multivitamin 1 Tablet(s) Oral daily  piperacillin/tazobactam IVPB.. 3.375 Gram(s) IV Intermittent every 8 hours    MEDICATIONS  (PRN):  acetaminophen     Tablet .. 650 milliGRAM(s) Oral every 6 hours PRN Temp greater or equal to 38C (100.4F), Mild Pain (1 - 3)  ALBUTerol    90 MICROgram(s) HFA Inhaler 2 Puff(s) Inhalation every 6 hours PRN Bronchospasm  aluminum hydroxide/magnesium hydroxide/simethicone Suspension 30 milliLiter(s) Oral every 4 hours PRN Dyspepsia  FIRST- Mouthwash  BLM 5 milliLiter(s) Swish and Swallow every 6 hours PRN Mouth Care  guaiFENesin Oral Liquid (Sugar-Free) 200 milliGRAM(s) Oral every 6 hours PRN Cough  melatonin 3 milliGRAM(s) Oral at bedtime PRN Insomnia  ondansetron Injectable 4 milliGRAM(s) IV Push every 8 hours PRN Nausea and/or Vomiting  oxyCODONE    IR 5 milliGRAM(s) Oral every 4 hours PRN Severe Pain (7 - 10)        I&O's Summary      PHYSICAL EXAM:  Vital Signs Last 24 Hrs  T(C): 36.6 (2022 07:40), Max: 37.6 (2022 19:27)  T(F): 97.8 (2022 07:40), Max: 99.7 (2022 19:27)  HR: 84 (2022 07:40) (84 - 90)  BP: 121/67 (2022 07:40) (113/76 - 150/74)  BP(mean): --  RR: 20 (2022 07:40) (18 - 20)  SpO2: 94% (2022 07:40) (94% - 96%)      Constitutional: NAD, Resting,   ENT: Supple, No JVD  Lungs: CTA B/L, Non-labored breathing  Cardio: RRR, S1/S2, No murmur  Abdomen: Soft, Nontender, Nondistended; Bowel sounds present, PEG tube  Extremities: No calf tenderness, No pitting edema  Musculoskeletal:   No clubbing or cyanosis of digits; no joint swelling or tenderness to palpation  Psych: Calm, cooperative affect appropriate  Neuro: Awake and alert, oriented x 4  Skin: No rashes; no palpable lesions    LABS:                        7.6    1.81  )-----------( 63       ( 2022 02:50 )             22.4     06-23    130<L>  |  89<L>  |  11.7  ----------------------------<  116<H>  4.5   |  32.0<H>  |  0.50    Ca    8.2<L>      2022 02:50    TPro  5.7<L>  /  Alb  3.1<L>  /  TBili  0.4  /  DBili  x   /  AST  69<H>  /  ALT  79<H>  /  AlkPhos  93  06-23          Urinalysis Basic - ( 2022 13:24 )    Color: Yellow / Appearance: Clear / S.015 / pH: x  Gluc: x / Ketone: Negative  / Bili: Negative / Urobili: 1 mg/dL   Blood: x / Protein: Negative / Nitrite: Negative   Leuk Esterase: Negative / RBC: Negative /HPF / WBC 0-2 /HPF   Sq Epi: x / Non Sq Epi: Occasional / Bacteria: Negative        Culture - Sputum (collected 2022 17:08)  Source: .Sputum Sputum  Gram Stain (2022 20:44):    Numerous polymorphonuclear leukocytes seen per low power field    Few Squamous epithelial cells seen per low power field    No organisms seen per oil power field    Culture - Blood (collected 2022 22:21)  Source: .Blood Blood-Peripheral  Preliminary Report (2022 23:01):    No growth to date.    Culture - Blood (collected 2022 22:21)  Source: .Blood Blood-Peripheral  Preliminary Report (2022 23:01):    No growth to date.      CAPILLARY BLOOD GLUCOSE      POCT Blood Glucose.: 146 mg/dL (2022 17:10)        RADIOLOGY REVIEWED  
Hospitalist Daily Progress Note    Chief Complaint:  Patient is a 57y old  Male who presents with a chief complaint of PNA (21 Jun 2022 11:01)      SUBJECTIVE / OVERNIGHT EVENTS:  Patient was seen and examined at bedside. States to be spitting up and is having increased sputum production.   Patient denies chest pain,  abd pain, N/V, fever, chills, dysuria or any other complaints. All remainder ROS negative.     MEDICATIONS  (STANDING):  gabapentin Solution 500 milliGRAM(s) Enteral Tube three times a day  guaiFENesin  milliGRAM(s) Oral every 12 hours  montelukast 10 milliGRAM(s) Oral daily  multivitamin 1 Tablet(s) Oral daily  piperacillin/tazobactam IVPB.. 3.375 Gram(s) IV Intermittent every 8 hours    MEDICATIONS  (PRN):  acetaminophen     Tablet .. 650 milliGRAM(s) Oral every 6 hours PRN Temp greater or equal to 38C (100.4F), Mild Pain (1 - 3)  aluminum hydroxide/magnesium hydroxide/simethicone Suspension 30 milliLiter(s) Oral every 4 hours PRN Dyspepsia  FIRST- Mouthwash  BLM 5 milliLiter(s) Swish and Swallow every 6 hours PRN Mouth Care  melatonin 3 milliGRAM(s) Oral at bedtime PRN Insomnia  ondansetron Injectable 4 milliGRAM(s) IV Push every 8 hours PRN Nausea and/or Vomiting  oxyCODONE    IR 5 milliGRAM(s) Oral every 4 hours PRN Severe Pain (7 - 10)        I&O's Summary      PHYSICAL EXAM:  Vital Signs Last 24 Hrs  T(C): 37 (21 Jun 2022 11:16), Max: 38.1 (20 Jun 2022 13:06)  T(F): 98.6 (21 Jun 2022 11:16), Max: 100.5 (20 Jun 2022 13:06)  HR: 81 (21 Jun 2022 11:16) (81 - 98)  BP: 124/70 (21 Jun 2022 11:16) (112/59 - 136/72)  BP(mean): --  RR: 18 (21 Jun 2022 11:16) (17 - 20)  SpO2: 94% (21 Jun 2022 11:16) (93% - 98%)      Constitutional: NAD, Resting  ENT: Supple, No JVD  Lungs: Mid crackles noted   Cardio: RRR, S1/S2, No murmur  Abdomen: Soft, Nontender, Nondistended; Bowel sounds present  Extremities: No calf tenderness, No pitting edema  Musculoskeletal:   No clubbing or cyanosis of digits; no joint swelling or tenderness to palpation  Psych: Calm, cooperative affect appropriate  Neuro: Awake and alert, oriented x 4  Skin: No rashes; no palpable lesions    LABS:                        7.5    1.56  )-----------( 62       ( 21 Jun 2022 03:01 )             22.4     06-21    130<L>  |  89<L>  |  13.9  ----------------------------<  103<H>  4.0   |  30.0<H>  |  0.47<L>    Ca    7.9<L>      21 Jun 2022 03:01    TPro  6.1<L>  /  Alb  3.3  /  TBili  0.6  /  DBili  x   /  AST  59<H>  /  ALT  50<H>  /  AlkPhos  100  06-20    PT/INR - ( 20 Jun 2022 14:38 )   PT: 18.1 sec;   INR: 1.55 ratio         PTT - ( 20 Jun 2022 14:38 )  PTT:25.3 sec  CARDIAC MARKERS ( 20 Jun 2022 14:38 )  x     / <0.01 ng/mL / x     / x     / x              CAPILLARY BLOOD GLUCOSE            RADIOLOGY REVIEWED  
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 Patient said he did not get the radiation tx today, as the treatment was about to start, he started having difficulty breathing, staff checked his pulse ox: 90 on RA. Patient reported cough productive of clear/brown sputum sometimes with blood streak which he said is baseline for him; he has difficulty bringing up sputum. He has no fever, chills, chest pain, palpitation, nausea, vomiting, abdominal pain, change in bowel/urinary habit.   States he is urinating well.  Has been off oral feedings for approx 1 month.  Takes TF + FWF.        Follow up hyponatremia  No acute complaints        PAST MEDICAL & SURGICAL HISTORY:  Anxiety      Asthma  hx of hospitalization 20 years ago.  presesntly Well controlled with present medication regimen      Neoplasm of base of tongue           FAMILY HISTORY:  FH: heart disease (Father)   at age 59    [place]    Social History:Non smoker    MEDICATIONS  (STANDING):  gabapentin Solution 500 milliGRAM(s) Enteral Tube three times a day  guaiFENesin  milliGRAM(s) Oral every 12 hours  montelukast 10 milliGRAM(s) Oral daily  multivitamin 1 Tablet(s) Oral daily  piperacillin/tazobactam IVPB.. 3.375 Gram(s) IV Intermittent every 8 hours    MEDICATIONS  (PRN):  acetaminophen     Tablet .. 650 milliGRAM(s) Oral every 6 hours PRN Temp greater or equal to 38C (100.4F), Mild Pain (1 - 3)  aluminum hydroxide/magnesium hydroxide/simethicone Suspension 30 milliLiter(s) Oral every 4 hours PRN Dyspepsia  FIRST- Mouthwash  BLM 5 milliLiter(s) Swish and Swallow every 6 hours PRN Mouth Care  melatonin 3 milliGRAM(s) Oral at bedtime PRN Insomnia  ondansetron Injectable 4 milliGRAM(s) IV Push every 8 hours PRN Nausea and/or Vomiting  oxyCODONE    IR 5 milliGRAM(s) Oral every 4 hours PRN Severe Pain (7 - 10)   Meds reviewed    Allergies    No Known Allergies    Intolerances         REVIEW OF SYSTEMS:    Review of Systems:   Constitutional: Denies fatigue  HEENT: Denies headaches and dizziness  Respiratory: denies SOB, +cough  Cardiovascular: denies CP, palpitations  Gastrointestinal: Denies nausea, denies vomiting, diarrhea, constipation, abdominal pain, or bloody stools  Genitourinary: denies painful urination, increased frequency, urgency, or bloody urine  Skin: denies rashes or itching  Musculoskeletal: denies muscle aches, joint swelling  Neurologic: Denies generalized weakness, denies loss of sensation, numbness, or tingling      Vital Signs Last 24 Hrs  T(C): 37 (2022 11:25), Max: 37.2 (2022 07:11)  T(F): 98.6 (2022 11:25), Max: 98.9 (2022 07:11)  HR: 88 (2022 11:25) (81 - 91)  BP: 117/68 (2022 11:25) (113/74 - 150/75)  BP(mean): --  RR: 18 (2022 11:25) (18 - 18)  SpO2: 96% (2022 11:25) (93% - 96%)    PHYSICAL EXAM:    GENERAL: NAD  HEAD:  Atraumatic, Normocephalic  EYES: EOMI, conjunctiva and sclera clear  ENMT: No Drainage from nares, No drainage from ears  NECK: Supple, neck  veins full  NERVOUS SYSTEM:  Awake and Alert  CHEST/LUNG: Clear to percussion bilaterally; No rales, rhonchi, wheezing, or rubs  HEART: Regular rate and rhythm; No murmurs, rubs, or gallops  ABDOMEN: Soft, Nontender, Nondistended; Bowel sounds present  EXTREMITIES:  No Edema  SKIN: No rashes No obvious ecchymosis      LABS:                                           7.8    1.77  )-----------( 63       ( 2022 03:18 )             22.6     06-22    131<L>  |  88<L>  |  13.2  ----------------------------<  100<H>  4.1   |  32.0<H>  |  0.53    Ca    8.4<L>      2022 03:18    TPro  5.9<L>  /  Alb  3.1<L>  /  TBili  0.6  /  DBili  x   /  AST  84<H>  /  ALT  72<H>  /  AlkPhos  90  06-22    PT/INR - ( 2022 14:38 )   PT: 18.1 sec;   INR: 1.55 ratio         PTT - ( 2022 14:38 )  PTT:25.3 sec      
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 Patient said he did not get the radiation tx today, as the treatment was about to start, he started having difficulty breathing, staff checked his pulse ox: 90 on RA. Patient reported cough productive of clear/brown sputum sometimes with blood streak which he said is baseline for him; he has difficulty bringing up sputum. He has no fever, chills, chest pain, palpitation, nausea, vomiting, abdominal pain, change in bowel/urinary habit.   States he is urinating well.  Has been off oral feedings for approx 1 month.  Takes TF + FWF.        Follow up hyponatremia  No acute complaints        PAST MEDICAL & SURGICAL HISTORY:  Anxiety      Asthma  hx of hospitalization 20 years ago.  presesntly Well controlled with present medication regimen      Neoplasm of base of tongue           FAMILY HISTORY:  FH: heart disease (Father)   at age 59    [place]    Social History:Non smoker    MEDICATIONS  (STANDING):  gabapentin Solution 500 milliGRAM(s) Enteral Tube three times a day  guaiFENesin  milliGRAM(s) Oral every 12 hours  montelukast 10 milliGRAM(s) Oral daily  multivitamin 1 Tablet(s) Oral daily  piperacillin/tazobactam IVPB.. 3.375 Gram(s) IV Intermittent every 8 hours    MEDICATIONS  (PRN):  acetaminophen     Tablet .. 650 milliGRAM(s) Oral every 6 hours PRN Temp greater or equal to 38C (100.4F), Mild Pain (1 - 3)  aluminum hydroxide/magnesium hydroxide/simethicone Suspension 30 milliLiter(s) Oral every 4 hours PRN Dyspepsia  FIRST- Mouthwash  BLM 5 milliLiter(s) Swish and Swallow every 6 hours PRN Mouth Care  melatonin 3 milliGRAM(s) Oral at bedtime PRN Insomnia  ondansetron Injectable 4 milliGRAM(s) IV Push every 8 hours PRN Nausea and/or Vomiting  oxyCODONE    IR 5 milliGRAM(s) Oral every 4 hours PRN Severe Pain (7 - 10)   Meds reviewed    Allergies    No Known Allergies    Intolerances         REVIEW OF SYSTEMS:    Review of Systems:   Constitutional: Denies fatigue  HEENT: Denies headaches and dizziness  Respiratory: denies SOB, +cough  Cardiovascular: denies CP, palpitations  Gastrointestinal: Denies nausea, denies vomiting, diarrhea, constipation, abdominal pain, or bloody stools  Genitourinary: denies painful urination, increased frequency, urgency, or bloody urine  Skin: denies rashes or itching  Musculoskeletal: denies muscle aches, joint swelling  Neurologic: Denies generalized weakness, denies loss of sensation, numbness, or tingling    Vital Signs Last 24 Hrs  T(C): 36.4 (2022 11:13), Max: 37.6 (2022 19:27)  T(F): 97.5 (2022 11:13), Max: 99.7 (2022 19:27)  HR: 81 (2022 11:13) (81 - 90)  BP: 118/67 (2022 11:13) (113/76 - 150/74)  BP(mean): --  RR: 18 (2022 11:13) (18 - 20)  SpO2: 96% (2022 11:13) (94% - 96%)    PHYSICAL EXAM:    GENERAL: NAD  HEAD:  Atraumatic, Normocephalic  EYES: EOMI, conjunctiva and sclera clear  ENMT: No Drainage from nares, No drainage from ears  NECK: Supple, neck  veins full  NERVOUS SYSTEM:  Awake and Alert  CHEST/LUNG: Clear to percussion bilaterally; No rales, rhonchi, wheezing, or rubs  HEART: Regular rate and rhythm; No murmurs, rubs, or gallops  ABDOMEN: Soft, Nontender, Nondistended; Bowel sounds present  EXTREMITIES:  No Edema  SKIN: No rashes No obvious ecchymosis      LABS:                        7.6    1.81  )-----------( 63       ( 2022 02:50 )             22.4     06-23    130<L>  |  89<L>  |  11.7  ----------------------------<  116<H>  4.5   |  32.0<H>  |  0.50    Ca    8.2<L>      2022 02:50    TPro  5.7<L>  /  Alb  3.1<L>  /  TBili  0.4  /  DBili  x   /  AST  69<H>  /  ALT  79<H>  /  AlkPhos  93  06-23      Urinalysis Basic - ( 2022 13:24 )    Color: Yellow / Appearance: Clear / S.015 / pH: x  Gluc: x / Ketone: Negative  / Bili: Negative / Urobili: 1 mg/dL   Blood: x / Protein: Negative / Nitrite: Negative   Leuk Esterase: Negative / RBC: Negative /HPF / WBC 0-2 /HPF   Sq Epi: x / Non Sq Epi: Occasional / Bacteria: Negative            
Hospitalist Daily Progress Note    Chief Complaint:  Patient is a 57y old  Male who presents with a chief complaint of PNA (21 Jun 2022 12:12)      SUBJECTIVE / OVERNIGHT EVENTS:  Patient was seen and examined at bedside. States cough is better.   Patient denies chest pain, SOB, abd pain, N/V, fever, chills, dysuria or any other complaints. All remainder ROS negative.     MEDICATIONS  (STANDING):  fentaNYL   Patch  75 MICROgram(s)/Hr 1 Patch Transdermal every 72 hours  gabapentin Solution 500 milliGRAM(s) Enteral Tube three times a day  montelukast 10 milliGRAM(s) Oral daily  multivitamin 1 Tablet(s) Oral daily  piperacillin/tazobactam IVPB.. 3.375 Gram(s) IV Intermittent every 8 hours    MEDICATIONS  (PRN):  acetaminophen     Tablet .. 650 milliGRAM(s) Oral every 6 hours PRN Temp greater or equal to 38C (100.4F), Mild Pain (1 - 3)  ALBUTerol    90 MICROgram(s) HFA Inhaler 2 Puff(s) Inhalation every 6 hours PRN Bronchospasm  aluminum hydroxide/magnesium hydroxide/simethicone Suspension 30 milliLiter(s) Oral every 4 hours PRN Dyspepsia  FIRST- Mouthwash  BLM 5 milliLiter(s) Swish and Swallow every 6 hours PRN Mouth Care  guaiFENesin Oral Liquid (Sugar-Free) 200 milliGRAM(s) Oral every 6 hours PRN Cough  melatonin 3 milliGRAM(s) Oral at bedtime PRN Insomnia  ondansetron Injectable 4 milliGRAM(s) IV Push every 8 hours PRN Nausea and/or Vomiting  oxyCODONE    IR 5 milliGRAM(s) Oral every 4 hours PRN Severe Pain (7 - 10)        I&O's Summary      PHYSICAL EXAM:  Vital Signs Last 24 Hrs  T(C): 37.2 (22 Jun 2022 07:11), Max: 37.2 (22 Jun 2022 07:11)  T(F): 98.9 (22 Jun 2022 07:11), Max: 98.9 (22 Jun 2022 07:11)  HR: 81 (22 Jun 2022 07:11) (81 - 91)  BP: 150/75 (22 Jun 2022 07:11) (113/74 - 150/75)  BP(mean): --  RR: 18 (22 Jun 2022 07:11) (18 - 18)  SpO2: 94% (22 Jun 2022 07:11) (93% - 95%)      Constitutional: NAD, Resting, chronically ill appearing male   ENT: Supple, No JVD  Lungs: CTA B/L, Non-labored breathing  Cardio: RRR, S1/S2, No murmur  Abdomen: Soft, Nontender, Nondistended; Bowel sounds present, PEG tube in place  Extremities: No calf tenderness, No pitting edema  Musculoskeletal:   No clubbing or cyanosis of digits; no joint swelling or tenderness to palpation  Psych: Calm, cooperative affect appropriate  Neuro: Awake and alert, oriented x 4  Skin: No rashes; no palpable lesions    LABS:                        7.8    1.77  )-----------( 63       ( 22 Jun 2022 03:18 )             22.6     06-22    131<L>  |  88<L>  |  13.2  ----------------------------<  100<H>  4.1   |  32.0<H>  |  0.53    Ca    8.4<L>      22 Jun 2022 03:18    TPro  5.9<L>  /  Alb  3.1<L>  /  TBili  0.6  /  DBili  x   /  AST  84<H>  /  ALT  72<H>  /  AlkPhos  90  06-22    PT/INR - ( 20 Jun 2022 14:38 )   PT: 18.1 sec;   INR: 1.55 ratio         PTT - ( 20 Jun 2022 14:38 )  PTT:25.3 sec  CARDIAC MARKERS ( 20 Jun 2022 14:38 )  x     / <0.01 ng/mL / x     / x     / x              Culture - Blood (collected 20 Jun 2022 22:21)  Source: .Blood Blood-Peripheral  Preliminary Report (21 Jun 2022 23:01):    No growth to date.    Culture - Blood (collected 20 Jun 2022 22:21)  Source: .Blood Blood-Peripheral  Preliminary Report (21 Jun 2022 23:01):    No growth to date.      CAPILLARY BLOOD GLUCOSE            RADIOLOGY REVIEWED  
Medicine Progress Note    Patient is a 57y old  Male who presents with a chief complaint of PNA (22 Jun 2022 11:21)      SUBJECTIVE / OVERNIGHT EVENTS:    ADDITIONAL REVIEW OF SYSTEMS:    MEDICATIONS  (STANDING):  fentaNYL   Patch  75 MICROgram(s)/Hr 1 Patch Transdermal every 72 hours  gabapentin Solution 500 milliGRAM(s) Enteral Tube three times a day  montelukast 10 milliGRAM(s) Oral daily  multivitamin 1 Tablet(s) Oral daily  piperacillin/tazobactam IVPB.. 3.375 Gram(s) IV Intermittent every 8 hours    MEDICATIONS  (PRN):  acetaminophen     Tablet .. 650 milliGRAM(s) Oral every 6 hours PRN Temp greater or equal to 38C (100.4F), Mild Pain (1 - 3)  ALBUTerol    90 MICROgram(s) HFA Inhaler 2 Puff(s) Inhalation every 6 hours PRN Bronchospasm  aluminum hydroxide/magnesium hydroxide/simethicone Suspension 30 milliLiter(s) Oral every 4 hours PRN Dyspepsia  FIRST- Mouthwash  BLM 5 milliLiter(s) Swish and Swallow every 6 hours PRN Mouth Care  guaiFENesin Oral Liquid (Sugar-Free) 200 milliGRAM(s) Oral every 6 hours PRN Cough  melatonin 3 milliGRAM(s) Oral at bedtime PRN Insomnia  ondansetron Injectable 4 milliGRAM(s) IV Push every 8 hours PRN Nausea and/or Vomiting  oxyCODONE    IR 5 milliGRAM(s) Oral every 4 hours PRN Severe Pain (7 - 10)    CAPILLARY BLOOD GLUCOSE        I&O's Summary      PHYSICAL EXAM:  Vital Signs Last 24 Hrs  T(C): 37 (22 Jun 2022 11:25), Max: 37.2 (22 Jun 2022 07:11)  T(F): 98.6 (22 Jun 2022 11:25), Max: 98.9 (22 Jun 2022 07:11)  HR: 88 (22 Jun 2022 11:25) (81 - 91)  BP: 117/68 (22 Jun 2022 11:25) (113/74 - 150/75)  BP(mean): --  RR: 18 (22 Jun 2022 11:25) (18 - 18)  SpO2: 96% (22 Jun 2022 11:25) (93% - 96%)  CONSTITUTIONAL: NAD, well-developed, well-groomed  ENMT: Moist oral mucosa, no pharyngeal injection or exudates; normal dentition  RESPIRATORY: Normal respiratory effort; lungs are clear to auscultation bilaterally  CARDIOVASCULAR: Regular rate and rhythm, normal S1 and S2, no murmur/rub/gallop; No lower extremity edema; Peripheral pulses are 2+ bilaterally  ABDOMEN: Nontender to palpation, normoactive bowel sounds, no rebound/guarding; No hepatosplenomegaly  PSYCH: A+O to person, place, and time; affect appropriate  NEUROLOGY: CN 2-12 are intact and symmetric; no gross sensory deficits   SKIN: No rashes; no palpable lesions    LABS:                        7.8    1.77  )-----------( 63       ( 22 Jun 2022 03:18 )             22.6     06-22    131<L>  |  88<L>  |  13.2  ----------------------------<  100<H>  4.1   |  32.0<H>  |  0.53    Ca    8.4<L>      22 Jun 2022 03:18    TPro  5.9<L>  /  Alb  3.1<L>  /  TBili  0.6  /  DBili  x   /  AST  84<H>  /  ALT  72<H>  /  AlkPhos  90  06-22    PT/INR - ( 20 Jun 2022 14:38 )   PT: 18.1 sec;   INR: 1.55 ratio         PTT - ( 20 Jun 2022 14:38 )  PTT:25.3 sec  CARDIAC MARKERS ( 20 Jun 2022 14:38 )  x     / <0.01 ng/mL / x     / x     / x              Culture - Blood (collected 20 Jun 2022 22:21)  Source: .Blood Blood-Peripheral  Preliminary Report (21 Jun 2022 23:01):    No growth to date.    Culture - Blood (collected 20 Jun 2022 22:21)  Source: .Blood Blood-Peripheral  Preliminary Report (21 Jun 2022 23:01):    No growth to date.      SARS-CoV-2: NotDetec (20 Jun 2022 14:20)  COVID-19 PCR: NotDetec (18 May 2022 13:26)      RADIOLOGY & ADDITIONAL TESTS:  Imaging from Last 24 Hours:    Electrocardiogram/QTc Interval:    COORDINATION OF CARE:  Care Discussed with Consultants/Other Providers:

## 2022-06-23 NOTE — DIETITIAN INITIAL EVALUATION ADULT - NS FNS DIET ORDER
Diet, NPO with Tube Feed:   Tube Feeding Modality: Gastrostomy  Vital 1.5 Osvaldo (VITAL1.5)  Continuous  Starting Tube Feed Rate {mL per Hour}: 10  Increase Tube Feed Rate by (mL): 10     Every 2 hours  Until Goal Tube Feed Rate (mL per Hour): 50  Tube Feed Duration (in Hours): 24  Tube Feed Start Time: 12:30  Free Water Flush   Total Volume per Flush (mL): 250   Frequency: Every 6 Hours (06-21-22 @ 12:22)

## 2022-06-23 NOTE — DIETITIAN INITIAL EVALUATION ADULT - OTHER INFO
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 Patient said he did not get the radiation tx today, as the treatment was about to start, he started having difficulty breathing, staff checked his pulse ox: 90 on RA. Patient reported cough productive of clear/brown sputum sometimes with blood streak which he said is baseline for him; he has difficulty bringing up sputum.  CT chest angio: no PE, RLL PNA. Patient meet sepsis criteria on arrival (temp: 100.5 and leukopenia) with aspiration PNA. IVF given as per protocol   Pt admitted with RLL PNA/sepsis

## 2022-06-24 ENCOUNTER — TRANSCRIPTION ENCOUNTER (OUTPATIENT)
Age: 57
End: 2022-06-24

## 2022-06-24 VITALS
RESPIRATION RATE: 18 BRPM | SYSTOLIC BLOOD PRESSURE: 126 MMHG | DIASTOLIC BLOOD PRESSURE: 55 MMHG | OXYGEN SATURATION: 95 % | HEART RATE: 81 BPM | TEMPERATURE: 99 F

## 2022-06-24 LAB
ALBUMIN SERPL ELPH-MCNC: 3.1 G/DL — LOW (ref 3.3–5.2)
ALP SERPL-CCNC: 101 U/L — SIGNIFICANT CHANGE UP (ref 40–120)
ALT FLD-CCNC: 99 U/L — HIGH
ANION GAP SERPL CALC-SCNC: 11 MMOL/L — SIGNIFICANT CHANGE UP (ref 5–17)
AST SERPL-CCNC: 72 U/L — HIGH
BASOPHILS # BLD AUTO: 0.01 K/UL — SIGNIFICANT CHANGE UP (ref 0–0.2)
BASOPHILS NFR BLD AUTO: 0.7 % — SIGNIFICANT CHANGE UP (ref 0–2)
BILIRUB SERPL-MCNC: 0.3 MG/DL — LOW (ref 0.4–2)
BUN SERPL-MCNC: 12.3 MG/DL — SIGNIFICANT CHANGE UP (ref 8–20)
CALCIUM SERPL-MCNC: 8.7 MG/DL — SIGNIFICANT CHANGE UP (ref 8.6–10.2)
CHLORIDE SERPL-SCNC: 90 MMOL/L — LOW (ref 98–107)
CO2 SERPL-SCNC: 29 MMOL/L — SIGNIFICANT CHANGE UP (ref 22–29)
CREAT SERPL-MCNC: 0.45 MG/DL — LOW (ref 0.5–1.3)
CULTURE RESULTS: SIGNIFICANT CHANGE UP
EGFR: 122 ML/MIN/1.73M2 — SIGNIFICANT CHANGE UP
EOSINOPHIL # BLD AUTO: 0.02 K/UL — SIGNIFICANT CHANGE UP (ref 0–0.5)
EOSINOPHIL NFR BLD AUTO: 1.4 % — SIGNIFICANT CHANGE UP (ref 0–6)
GLUCOSE SERPL-MCNC: 101 MG/DL — HIGH (ref 70–99)
HCT VFR BLD CALC: 23.4 % — LOW (ref 39–50)
HGB BLD-MCNC: 7.9 G/DL — LOW (ref 13–17)
IMM GRANULOCYTES NFR BLD AUTO: 0.7 % — SIGNIFICANT CHANGE UP (ref 0–1.5)
LYMPHOCYTES # BLD AUTO: 0.16 K/UL — LOW (ref 1–3.3)
LYMPHOCYTES # BLD AUTO: 10.9 % — LOW (ref 13–44)
MCHC RBC-ENTMCNC: 33.2 PG — SIGNIFICANT CHANGE UP (ref 27–34)
MCHC RBC-ENTMCNC: 33.8 GM/DL — SIGNIFICANT CHANGE UP (ref 32–36)
MCV RBC AUTO: 98.3 FL — SIGNIFICANT CHANGE UP (ref 80–100)
MONOCYTES # BLD AUTO: 0.24 K/UL — SIGNIFICANT CHANGE UP (ref 0–0.9)
MONOCYTES NFR BLD AUTO: 16.3 % — HIGH (ref 2–14)
NEUTROPHILS # BLD AUTO: 1.03 K/UL — LOW (ref 1.8–7.4)
NEUTROPHILS NFR BLD AUTO: 70 % — SIGNIFICANT CHANGE UP (ref 43–77)
PLATELET # BLD AUTO: 63 K/UL — LOW (ref 150–400)
POTASSIUM SERPL-MCNC: 4.7 MMOL/L — SIGNIFICANT CHANGE UP (ref 3.5–5.3)
POTASSIUM SERPL-SCNC: 4.7 MMOL/L — SIGNIFICANT CHANGE UP (ref 3.5–5.3)
PROT SERPL-MCNC: 6 G/DL — LOW (ref 6.6–8.7)
RBC # BLD: 2.38 M/UL — LOW (ref 4.2–5.8)
RBC # FLD: 14.6 % — HIGH (ref 10.3–14.5)
SODIUM SERPL-SCNC: 130 MMOL/L — LOW (ref 135–145)
SPECIMEN SOURCE: SIGNIFICANT CHANGE UP
WBC # BLD: 1.47 K/UL — LOW (ref 3.8–10.5)
WBC # FLD AUTO: 1.47 K/UL — LOW (ref 3.8–10.5)

## 2022-06-24 PROCEDURE — 84295 ASSAY OF SERUM SODIUM: CPT

## 2022-06-24 PROCEDURE — 85025 COMPLETE CBC W/AUTO DIFF WBC: CPT

## 2022-06-24 PROCEDURE — 80048 BASIC METABOLIC PNL TOTAL CA: CPT

## 2022-06-24 PROCEDURE — 84550 ASSAY OF BLOOD/URIC ACID: CPT

## 2022-06-24 PROCEDURE — 87040 BLOOD CULTURE FOR BACTERIA: CPT

## 2022-06-24 PROCEDURE — 85610 PROTHROMBIN TIME: CPT

## 2022-06-24 PROCEDURE — 82947 ASSAY GLUCOSE BLOOD QUANT: CPT

## 2022-06-24 PROCEDURE — 83605 ASSAY OF LACTIC ACID: CPT

## 2022-06-24 PROCEDURE — 82962 GLUCOSE BLOOD TEST: CPT

## 2022-06-24 PROCEDURE — 83930 ASSAY OF BLOOD OSMOLALITY: CPT

## 2022-06-24 PROCEDURE — 81001 URINALYSIS AUTO W/SCOPE: CPT

## 2022-06-24 PROCEDURE — 82435 ASSAY OF BLOOD CHLORIDE: CPT

## 2022-06-24 PROCEDURE — 87070 CULTURE OTHR SPECIMN AEROBIC: CPT

## 2022-06-24 PROCEDURE — 82330 ASSAY OF CALCIUM: CPT

## 2022-06-24 PROCEDURE — 85014 HEMATOCRIT: CPT

## 2022-06-24 PROCEDURE — 99285 EMERGENCY DEPT VISIT HI MDM: CPT | Mod: 25

## 2022-06-24 PROCEDURE — 82803 BLOOD GASES ANY COMBINATION: CPT

## 2022-06-24 PROCEDURE — 85730 THROMBOPLASTIN TIME PARTIAL: CPT

## 2022-06-24 PROCEDURE — 84484 ASSAY OF TROPONIN QUANT: CPT

## 2022-06-24 PROCEDURE — 36415 COLL VENOUS BLD VENIPUNCTURE: CPT

## 2022-06-24 PROCEDURE — 85027 COMPLETE CBC AUTOMATED: CPT

## 2022-06-24 PROCEDURE — 0225U NFCT DS DNA&RNA 21 SARSCOV2: CPT

## 2022-06-24 PROCEDURE — 87086 URINE CULTURE/COLONY COUNT: CPT

## 2022-06-24 PROCEDURE — 84132 ASSAY OF SERUM POTASSIUM: CPT

## 2022-06-24 PROCEDURE — 93005 ELECTROCARDIOGRAM TRACING: CPT

## 2022-06-24 PROCEDURE — 99239 HOSP IP/OBS DSCHRG MGMT >30: CPT

## 2022-06-24 PROCEDURE — 71275 CT ANGIOGRAPHY CHEST: CPT | Mod: MG

## 2022-06-24 PROCEDURE — 83880 ASSAY OF NATRIURETIC PEPTIDE: CPT

## 2022-06-24 PROCEDURE — 80053 COMPREHEN METABOLIC PANEL: CPT

## 2022-06-24 PROCEDURE — G1004: CPT

## 2022-06-24 PROCEDURE — 85018 HEMOGLOBIN: CPT

## 2022-06-24 RX ADMIN — GABAPENTIN 500 MILLIGRAM(S): 400 CAPSULE ORAL at 05:12

## 2022-06-24 RX ADMIN — PIPERACILLIN AND TAZOBACTAM 25 GRAM(S): 4; .5 INJECTION, POWDER, LYOPHILIZED, FOR SOLUTION INTRAVENOUS at 05:12

## 2022-06-24 NOTE — DISCHARGE NOTE PROVIDER - NSDCHHCONTACT_GEN_ALL_CORE_FT
Staff spoke with patient to confirm appointment scheduled with Ari Dickerson 6/2/20 at 9:40am as virtual audio call patient was informed that provider may call 15 minutes before or after the visit depending on the visit before patient verbalized understanding    
As certified below, I, or a nurse practitioner or physician assistant working with me, had a face-to-face encounter that meets the physician face-to-face encounter requirements.

## 2022-06-24 NOTE — DISCHARGE NOTE PROVIDER - HOSPITAL COURSE
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 Patient was admitted to the Hospital for Aspiration pneumonia. Patient was seen by Heme onc for Pancytopenia likely secondary to recent cisplantin. Patient was also seen by Nephrology for hyponatremia. Patient was taking too much free water at home and was advised to not do that. Now stable for DC with outpatient follow up.

## 2022-06-24 NOTE — DISCHARGE NOTE NURSING/CASE MANAGEMENT/SOCIAL WORK - PATIENT PORTAL LINK FT
You can access the FollowMyHealth Patient Portal offered by [facility] by registering at the following website: [email or web address]. By joining [email or web address] FollowMyHealth portal, you will also be able to view your health information using other applications (apps) compatible with our system.

## 2022-06-24 NOTE — DISCHARGE NOTE PROVIDER - DETAILS OF MALNUTRITION DIAGNOSIS/DIAGNOSES
This patient has been assessed with a concern for Malnutrition and was treated during this hospitalization for the following Nutrition diagnosis/diagnoses:     -  06/23/2022: Severe protein-calorie malnutrition

## 2022-06-24 NOTE — DISCHARGE NOTE PROVIDER - PROVIDER TOKENS
PROVIDER:[TOKEN:[[id number]],FOLLOWUP:[1-3 days]],PROVIDER:[TOKEN:[[id number]],FOLLOWUP:[1 week]],FREE:[LAST:[PCP],PHONE:[(   )    -],FAX:[(   )    -],FOLLOWUP:[1-3 days]]

## 2022-06-24 NOTE — DISCHARGE NOTE PROVIDER - NSDCMRMEDTOKEN_GEN_ALL_CORE_FT
amoxicillin-clavulanate 875 mg-125 mg oral tablet: 1 tab(s) by gastrostomy tube 2 times a day   fentaNYL 75 mcg/hr transdermal film, extended release: 1 film(s) transdermal every 72 hours  FIRST Mouthwash BLM mucous membrane suspension: 5 milliliter(s) mucous membrane 4 times a day, As Needed  Flonase 50 mcg/inh nasal spray: 1 spray(s) nasal once a day  gabapentin 250 mg/5 mL oral solution: 10 milliliter(s) orally 3 times a day  [employer] Tube Feeds: as directed  metoclopramide 10 mg oral tablet: 1 tab(s) orally 4 times a day (before meals and at bedtime)  oxyCODONE 5 mg oral tablet: 1 to 2 tabs every 4 to 6 hours as needed  Singulair 10 mg oral tablet: 1 tab(s) orally once a day PM

## 2022-06-24 NOTE — DISCHARGE NOTE PROVIDER - NSDCFUSCHEDAPPT_GEN_ALL_CORE_FT
Francisco Johnson  [facility] Physician [facility]  PHYSMED [address]  Scheduled Appointment: 07/01/2022    [facility]  [facility]  Scheduled Appointment: 07/12/2022    [facility]  REYNALDO [address]  Scheduled Appointment: 07/18/2022

## 2022-06-24 NOTE — DISCHARGE NOTE PROVIDER - ATTENDING DISCHARGE PHYSICAL EXAMINATION:
PHYSICAL EXAM:  Vital Signs Last 24 Hrs  T(F): 99 (24 Jun 2022 08:40), Max: 99 (24 Jun 2022 08:40)  HR: 89 (24 Jun 2022 08:40) (81 - 90)  BP: 112/68 (24 Jun 2022 08:40) (112/68 - 133/79)  RR: 18 (24 Jun 2022 08:40) (18 - 18)  SpO2: 94% (24 Jun 2022 08:40) (94% - 98%)    GENERAL: NAD, Resting in bed  Eyes: EOMI, PERRLA  ENMT: Conjunctiva and sclera clear; supple neck, No JVD  Cardiovascular: S1,S2, RRR, No murmur  Respiratory: CTA B/L, Non-labored breathing  GI: Bowel sounds present; Soft, Nontender, Nondistended. No hepatomegaly, PEG tube  Genitourinary: Deferred  Skin:  no breakdowns, ulcers or discharge, No rashes or lesions  Neurology: Alert & Oriented X3, non-focal and spontaneous movements of all extremities, CN 2 to 12 grossly intact   Psych: Appropriate mood and affect, calm, pleasant, Responds appropriately to questions

## 2022-06-24 NOTE — DISCHARGE NOTE NURSING/CASE MANAGEMENT/SOCIAL WORK - NSDCPEFALRISK_GEN_ALL_CORE
For information on Fall & Injury Prevention, visit: [email or web address] OR  [email or web address] OR  https://www.cdc.gov/steadi/patient.html

## 2022-06-24 NOTE — DISCHARGE NOTE PROVIDER - CARE PROVIDER_API CALL
Avani Carvajal)  Hematology; Internal Medicine; Medical Oncology  [address] [place] [address]  Phone: [phone]  Fax: [phone]  Follow Up Time: 1-3 days    Jae Luevano)  Nephrology  [address], [address]  [place] [address]  Phone: [phone]  Fax: [phone]  Follow Up Time: 1 week    PCP,   Phone: (   )    -  Fax: (   )    -  Follow Up Time: 1-3 days

## 2022-06-24 NOTE — DISCHARGE NOTE PROVIDER - NSDCCPCAREPLAN_GEN_ALL_CORE_FT
PRINCIPAL DISCHARGE DIAGNOSIS  Diagnosis: Pneumonia  Assessment and Plan of Treatment: Likely secondary to aspiration pneumonia. You were treated with IV antibiotics and now will be transitioned to oral antibiotics to complete the course. Please follow up with your PCP upon discharge. You will need repeat imaging to be performed in about 6 weeks to ensure resolution of pneumonia.      SECONDARY DISCHARGE DIAGNOSES  Diagnosis: Pancytopenia due to chemotherapy  Assessment and Plan of Treatment: You were noted to have pancytopenia secondary to cisplantin. Please follow up with  your hematologist.    Diagnosis: Hyponatremia  Assessment and Plan of Treatment: Your sodium was noted to be low. This is likely secondary to too much free water flushes. Please do free water flush at 250cc per 6 hours. Please follow up with your PCP/Nephrology to assess and do repeat labs next week.

## 2022-06-25 LAB
CULTURE RESULTS: SIGNIFICANT CHANGE UP
CULTURE RESULTS: SIGNIFICANT CHANGE UP
SPECIMEN SOURCE: SIGNIFICANT CHANGE UP
SPECIMEN SOURCE: SIGNIFICANT CHANGE UP

## 2022-06-27 ENCOUNTER — NON-APPOINTMENT (OUTPATIENT)
Age: 57
End: 2022-06-27

## 2022-06-27 VITALS
BODY MASS INDEX: 21.18 KG/M2 | OXYGEN SATURATION: 94 % | RESPIRATION RATE: 16 BRPM | HEART RATE: 110 BPM | SYSTOLIC BLOOD PRESSURE: 153 MMHG | HEIGHT: 69 IN | WEIGHT: 143 LBS | TEMPERATURE: 97 F | DIASTOLIC BLOOD PRESSURE: 76 MMHG

## 2022-06-27 PROCEDURE — 77387B: CUSTOM | Mod: 26

## 2022-06-28 ENCOUNTER — APPOINTMENT (OUTPATIENT)
Dept: HEMATOLOGY ONCOLOGY | Facility: CLINIC | Age: 57
End: 2022-06-28
Payer: MEDICAID

## 2022-06-28 ENCOUNTER — RESULT REVIEW (OUTPATIENT)
Age: 57
End: 2022-06-28

## 2022-06-28 VITALS
HEIGHT: 69 IN | OXYGEN SATURATION: 96 % | WEIGHT: 143 LBS | BODY MASS INDEX: 21.18 KG/M2 | HEART RATE: 99 BPM | DIASTOLIC BLOOD PRESSURE: 72 MMHG | SYSTOLIC BLOOD PRESSURE: 132 MMHG

## 2022-06-28 LAB
BASOPHILS # BLD AUTO: 0.1 K/UL — SIGNIFICANT CHANGE UP (ref 0–0.2)
BASOPHILS NFR BLD AUTO: 2.3 % — HIGH (ref 0–2)
EOSINOPHIL # BLD AUTO: 0 K/UL — SIGNIFICANT CHANGE UP (ref 0–0.5)
EOSINOPHIL NFR BLD AUTO: 1.5 % — SIGNIFICANT CHANGE UP (ref 0–6)
HCT VFR BLD CALC: 24.7 % — LOW (ref 39–50)
HGB BLD-MCNC: 8.4 G/DL — LOW (ref 13–17)
LYMPHOCYTES # BLD AUTO: 0.3 K/UL — LOW (ref 1–3.3)
LYMPHOCYTES # BLD AUTO: 14.5 % — SIGNIFICANT CHANGE UP (ref 13–44)
MCHC RBC-ENTMCNC: 33.3 PG — SIGNIFICANT CHANGE UP (ref 27–34)
MCHC RBC-ENTMCNC: 33.8 G/DL — SIGNIFICANT CHANGE UP (ref 32–36)
MCV RBC AUTO: 98.7 FL — SIGNIFICANT CHANGE UP (ref 80–100)
MONOCYTES # BLD AUTO: 0.3 K/UL — SIGNIFICANT CHANGE UP (ref 0–0.9)
MONOCYTES NFR BLD AUTO: 14 % — SIGNIFICANT CHANGE UP (ref 2–14)
NEUTROPHILS # BLD AUTO: 1.5 K/UL — LOW (ref 1.8–7.4)
NEUTROPHILS NFR BLD AUTO: 67.8 % — SIGNIFICANT CHANGE UP (ref 43–77)
PLATELET # BLD AUTO: 120 K/UL — LOW (ref 150–400)
RBC # BLD: 2.51 M/UL — LOW (ref 4.2–5.8)
RBC # FLD: 14.6 % — HIGH (ref 10.3–14.5)
WBC # BLD: 2.3 K/UL — LOW (ref 3.8–10.5)
WBC # FLD AUTO: 2.3 K/UL — LOW (ref 3.8–10.5)

## 2022-06-28 PROCEDURE — 77427 RADIATION TX MANAGEMENT X5: CPT

## 2022-06-28 PROCEDURE — 77387B: CUSTOM | Mod: 26

## 2022-06-28 PROCEDURE — 99214 OFFICE O/P EST MOD 30 MIN: CPT

## 2022-06-29 LAB
ALBUMIN SERPL ELPH-MCNC: 4 G/DL
ALP BLD-CCNC: 101 U/L
ALT SERPL-CCNC: 67 U/L
ANION GAP SERPL CALC-SCNC: 10 MMOL/L
AST SERPL-CCNC: 24 U/L
BILIRUB SERPL-MCNC: 0.2 MG/DL
BUN SERPL-MCNC: 28 MG/DL
CALCIUM SERPL-MCNC: 9.1 MG/DL
CHLORIDE SERPL-SCNC: 96 MMOL/L
CO2 SERPL-SCNC: 31 MMOL/L
CREAT SERPL-MCNC: 0.68 MG/DL
EGFR: 108 ML/MIN/1.73M2
GLUCOSE SERPL-MCNC: 109 MG/DL
MAGNESIUM SERPL-MCNC: 1.7 MG/DL
POTASSIUM SERPL-SCNC: 4.9 MMOL/L
PROT SERPL-MCNC: 6.4 G/DL
SODIUM SERPL-SCNC: 137 MMOL/L

## 2022-07-01 ENCOUNTER — APPOINTMENT (OUTPATIENT)
Dept: PHYSICAL MEDICINE AND REHAB | Facility: CLINIC | Age: 57
End: 2022-07-01

## 2022-07-05 NOTE — DISEASE MANAGEMENT
[FreeTextEntry4] : SCCa BOT, p16/HPV16 positive [TTNM] : 4 [NTNM] : 1 [MTNM] : 0 [de-identified] : 2386 [de-identified] : 9348 [de-identified] : head & neck

## 2022-07-05 NOTE — HISTORY OF PRESENT ILLNESS
[Follow up with Radiation MD in _____] : Follow up with Radiation MD in [unfilled] [Follow up with Oncologist in _____] : Follow up with Oncologist in [unfilled] [Follow up with Surgeon in _____] : Follow up with Surgeon in [unfilled] [Primary care physician] : primary care physician [[facility]] : [facility] [FreeTextEntry8] : Elizabeth Au

## 2022-07-05 NOTE — PHYSICAL EXAM
[de-identified] : mucositis of posterior oral tongue and oropharynx [de-identified] : mild erythema and hyperpigmentation of the neck, small area of dry desquamation [de-identified] : dressing c/d/i [de-identified] : mild irritation of the left neck from necklace

## 2022-07-06 ENCOUNTER — RX RENEWAL (OUTPATIENT)
Age: 57
End: 2022-07-06

## 2022-07-06 NOTE — HISTORY OF PRESENT ILLNESS
[Date: ____________] : Patient's last distress assessment performed on [unfilled]. [0 - No Distress] : Distress Level: 0 [de-identified] : 56 yo M with h/o chronic sinusitis who was diagnosed with SCC of the left BOT in March 2022.\par \par He saw ENT, Dr. Erick Sultana, c/o headaches, facial pain and left ear pain.  CT maxillofacial sinus on 2/22/22 showed highly suspicious soft tissue fullness oropharynx left glossotonsilar sulcus, base of tongue/vallecula measuring 2.5 x 2.3 concerning for neoplasm. Suspicious left level 2A lymph node measure 1.6 cm.  Follow up CT neck on 3/4/22 showed soft tissue mass left oropharynx 28.5 x 23.9 x 34.3 mm with involvement of the intrinsic tongue muscles (T4).  2 unilateral pathologic left 2A lymph nodes with cystic necrosis concerning for tory metastasis (N1).\par \par He next saw ENT surgery, Dr. Renny Tena, where he was scheduled for EUA on 3/24/22 which stated "by palpation, the patient was found to have a broadly based tumor epicentered in his tongue base.  Anteriorly, it extended to the proximal tongue base circumvallate papilla region.  It extended laterally to, but not  across the glossotonsillar sulcus anteriorly and more posteriorly, laterally it extended to, but not across the glossopharyngeal sulcus.  The tumor seemed to extend just to the midline both in its anterior and posterior extent.  The tumor seemed to approach, but not cross into the vallecula."  Biopsy c/w non-keratinizing SCC.\par \par Staging PET pending for 4/8/22, which did not show evidence of metastatic disease. \par \par The patient was started on defintiive cRT with weekly cisplatin.  [de-identified] : Patient presents on s/p C7 weekly Cisplatin with concurrent radiation therapy for SCC of the left BOT.  End of RT delayed as patient was admitted 6/20/22 to [facility] with aspiration pneumonia, hyponatremia & pancytopenia, discharged on 6/24/22 and resumed RT on 6/27/22, will complete RT today, 6/28/22.  \par + Significant weight loss despite PEG, likely due from recent hospitalization where he was receiving a different feeding formula. Currently NPO but still some odynophagia just from swallowing salvia.  + Thick oral secretions, slowly improving. + Mildly erythematous skin around neck, unchanged. + Fatigue. No N/V. No worse than baseline tinnitus. No recent fevers or SOB. No constipation or diarrhea. Increased fentanyl dose has provided good control of pain, using only one 5mg oxycodone Q6 hours.

## 2022-07-06 NOTE — PHYSICAL EXAM
[Fully active, able to carry on all pre-disease performance without restriction] : Status 0 - Fully active, able to carry on all pre-disease performance without restriction [Thin] : thin [Normal] : affect appropriate [de-identified] : + pharyngeal erythema.  [de-identified] : palpable cervical lymph node on the left, improved

## 2022-07-06 NOTE — ASSESSMENT
[FreeTextEntry1] : 57 year old man with no significant past medical history who presents to the oncology clinic with locally advanced oropharyngeal cancer\par \par # oropharyngeal cancer \par - pre treatment staging PET-CT on 4/8/22:  FDG avid LEFT oropharyngeal/base of tongue mass, compatible with the given diagnosis of carcinoma. FDG avid metastatic LEFT level IIa cervical nodes. Mildly FDG avid subcentimeter RIGHT level IIa cervical node, nonspecific. FDG avid first molar RIGHT mandibular focus, likely dental in etiology. \par - scheduled to complete RT today, received C7 of Cisplatin on 6/14/22\par - continue mucinex for thick oral secretions\par - continue xylimelts for xerostomia \par - pain well controlled, will consider weaning down fentanyl at next follow up in 2 weeks \par - MD/PA follow up in 14 days \par - post treatment PET 3 months after completion of RT, late September. \par \par # Weight loss\par - s/p PEG placement but lost significant weight while admitted at [facility] last week \par - NPO at this time s/p recent hospital admission for aspiration PNA.  Will help patient make follow up with SLP ASAP\par - RD Gillian following closely. Continue [employer] formula \par \par # prostamegaly \par - noted; will follow up with urology after completion of treatment.

## 2022-07-11 RX ORDER — MONTELUKAST 10 MG/1
10 TABLET, FILM COATED ORAL DAILY
Qty: 30 | Refills: 1 | Status: ACTIVE | COMMUNITY
Start: 2022-03-17 | End: 1900-01-01

## 2022-07-11 RX ORDER — ALBUTEROL SULFATE 90 UG/1
108 (90 BASE) INHALANT RESPIRATORY (INHALATION)
Qty: 1 | Refills: 1 | Status: ACTIVE | COMMUNITY
Start: 1900-01-01 | End: 1900-01-01

## 2022-07-11 NOTE — H&P ADULT - BACK EXAM
"Clinic Care Coordination Contact    Follow Up Progress Note      Assessment:  received a call from patient. He said for cash assistance he will get $203 per month he just found out.     The PCA agency [employer] [employer] [facility] keeps saying they will have a staff and then back away saying they don't. \"Gillian\" never came out.  called with patient to [facility] and they keep saying they need more time to find the best fit. Hasan there also made a comment that patient may need to look for a different agency as he has been a \"difficult aggressive\" client to work with. Explained pt needs patience and understanding as he often needs to be explained things more in depth to understand them.      then called pt's KHARI Vaughn with patient on the line. Collaborated and he will reach out to a different agency to see if they can take on the referral or have staffing. He will also see if someone can help him fill out the emergency assistance application for rent help.    Patient expresses again defeat and feelings of sadness on waiting for services. He still has his homecare RN and person that helps him shower coming out. They have not discharged yet    Care Gaps:    Health Maintenance Due   Topic Date Due     ZOSTER IMMUNIZATION (1 of 2) Never done     LUNG CANCER SCREENING  01/26/2013     PREVENTIVE CARE VISIT  04/10/2014     Pneumococcal Vaccine: Pediatrics (0 to 5 Years) and At-Risk Patients (6 to 64 Years) (2 - PCV) 07/24/2016     COLORECTAL CANCER SCREENING  06/03/2021     COVID-19 Vaccine (3 - Booster for Moderna series) 09/22/2021     EYE EXAM  03/17/2022     MICROALBUMIN  07/14/2022       Did not focus on care gaps today    Goals addressed this encounter:    Goals Addressed                    This Visit's Progress       Patient Stated       Psychosocial (pt-stated)   70%      8-Goal Statement: I will work with [facility] case management to find alternative housing option within 6 " months   Date Goal set: 5/19/22  Barriers: availability of housing options-   Strengths: has county waiver now, and [facility] services   Date to Achieve By: Nov 19th, 2022  Patient expressed understanding of goal: yes  Action steps to achieve this goal:  1. I will continue to meet with  to figure out options -Porsche Chacon   2. I will keep PCP and care coordinator updated   3. I will submit any paperwork needed at facilities     As of today's date 6/2/2022 goal is met at 26 - 50%.   Goal Status:  Active.               will continue to follow every 1-2 weeks    Christine Rutledge, MSALEX, [facility]  Clinic Care Coordinator  [email or web address]  [phone]   normal shape/ROM intact/strength intact

## 2022-07-12 ENCOUNTER — RESULT REVIEW (OUTPATIENT)
Age: 57
End: 2022-07-12

## 2022-07-12 ENCOUNTER — APPOINTMENT (OUTPATIENT)
Dept: PHYSICAL MEDICINE AND REHAB | Facility: CLINIC | Age: 57
End: 2022-07-12

## 2022-07-12 ENCOUNTER — APPOINTMENT (OUTPATIENT)
Dept: HEMATOLOGY ONCOLOGY | Facility: CLINIC | Age: 57
End: 2022-07-12

## 2022-07-12 VITALS
SYSTOLIC BLOOD PRESSURE: 120 MMHG | HEIGHT: 69 IN | BODY MASS INDEX: 21.48 KG/M2 | WEIGHT: 145 LBS | HEART RATE: 104 BPM | OXYGEN SATURATION: 96 % | DIASTOLIC BLOOD PRESSURE: 76 MMHG

## 2022-07-12 LAB
ALBUMIN SERPL ELPH-MCNC: 4.1 G/DL
ALP BLD-CCNC: 88 U/L
ALT SERPL-CCNC: 10 U/L
ANION GAP SERPL CALC-SCNC: 13 MMOL/L
AST SERPL-CCNC: 14 U/L
BASOPHILS # BLD AUTO: 0.1 K/UL — SIGNIFICANT CHANGE UP (ref 0–0.2)
BASOPHILS NFR BLD AUTO: 1.6 % — SIGNIFICANT CHANGE UP (ref 0–2)
BILIRUB SERPL-MCNC: 0.3 MG/DL
BUN SERPL-MCNC: 17 MG/DL
CALCIUM SERPL-MCNC: 9.7 MG/DL
CHLORIDE SERPL-SCNC: 92 MMOL/L
CO2 SERPL-SCNC: 30 MMOL/L
CREAT SERPL-MCNC: 0.66 MG/DL
EGFR: 109 ML/MIN/1.73M2
EOSINOPHIL # BLD AUTO: 0.2 K/UL — SIGNIFICANT CHANGE UP (ref 0–0.5)
EOSINOPHIL NFR BLD AUTO: 2.4 % — SIGNIFICANT CHANGE UP (ref 0–6)
GLUCOSE SERPL-MCNC: 100 MG/DL
HCT VFR BLD CALC: 27.1 % — LOW (ref 39–50)
HGB BLD-MCNC: 8.8 G/DL — LOW (ref 13–17)
LYMPHOCYTES # BLD AUTO: 0.5 K/UL — LOW (ref 1–3.3)
LYMPHOCYTES # BLD AUTO: 5.8 % — LOW (ref 13–44)
MAGNESIUM SERPL-MCNC: 1.7 MG/DL
MCHC RBC-ENTMCNC: 32.5 G/DL — SIGNIFICANT CHANGE UP (ref 32–36)
MCHC RBC-ENTMCNC: 33.9 PG — SIGNIFICANT CHANGE UP (ref 27–34)
MCV RBC AUTO: 104.3 FL — HIGH (ref 80–100)
MONOCYTES # BLD AUTO: 1 K/UL — HIGH (ref 0–0.9)
MONOCYTES NFR BLD AUTO: 11.3 % — SIGNIFICANT CHANGE UP (ref 2–14)
NEUTROPHILS # BLD AUTO: 6.7 K/UL — SIGNIFICANT CHANGE UP (ref 1.8–7.4)
NEUTROPHILS NFR BLD AUTO: 79 % — HIGH (ref 43–77)
PLATELET # BLD AUTO: 373 K/UL — SIGNIFICANT CHANGE UP (ref 150–400)
POTASSIUM SERPL-SCNC: 4.7 MMOL/L
PROT SERPL-MCNC: 6.7 G/DL
RBC # BLD: 2.6 M/UL — LOW (ref 4.2–5.8)
RBC # FLD: 18.2 % — HIGH (ref 10.3–14.5)
SODIUM SERPL-SCNC: 135 MMOL/L
WBC # BLD: 8.5 K/UL — SIGNIFICANT CHANGE UP (ref 3.8–10.5)
WBC # FLD AUTO: 8.5 K/UL — SIGNIFICANT CHANGE UP (ref 3.8–10.5)

## 2022-07-12 PROCEDURE — 99214 OFFICE O/P EST MOD 30 MIN: CPT

## 2022-07-12 NOTE — PHYSICAL EXAM
[FreeTextEntry1] : Gen: Patient is A&O x 3, NAD\par HEENT: EOMI, hearing grossly normal\par Resp: regular, non - labored\par CV: pulses regular\par Skin: no rashes, erythema\par Lymph: no clubbing, cyanosis, edema, or palpable lymphadenopathy\par Inspection: no instability or misalignment\par ROM: full throughout\par Palpation: TTP in oral region \par Sensation: intact to light touch\par Reflexes: 1+ and symmetric throughout\par Strength: 5/5 throughout\par Special tests: -Hawkin's sign \par Gait: normal, non-antalgic\par \par

## 2022-07-12 NOTE — HISTORY OF PRESENT ILLNESS
[FreeTextEntry1] : Mr. Moses is a 57 year old male with OPSCC (BOT, cT2 N1, left level 2A lymphadenopathy), undergoing definitive chemoradiation with weekly Cisplatin.  He completed chemotherapy with cisplatin and radiation therapy in June 2022.\par \par He still reports that he is having significant pain.  Although it is improving.  He is currently on fentanyl patch 75 mcg/h.  Taking oxycodone 5 mg every 4-6 hours as needed.  Denies any swelling in his submental region.  Describes significant burning pain.  Taking gabapentin 500 mg 3 times a day.  Has not yet utilize medical cannabis.\par \par \par

## 2022-07-13 NOTE — ASSESSMENT
[FreeTextEntry1] : 57 year old man with no significant past medical history who presents to the oncology clinic with locally advanced oropharyngeal cancer\par \par # oropharyngeal cancer \par - pre treatment staging PET-CT on 4/8/22:  FDG avid LEFT oropharyngeal/base of tongue mass, compatible with the given diagnosis of carcinoma. FDG avid metastatic LEFT level IIa cervical nodes. Mildly FDG avid subcentimeter RIGHT level IIa cervical node, nonspecific. FDG avid first molar RIGHT mandibular focus, likely dental in etiology. \par - completed RT on 6/28/22, received C7 of Cisplatin on 6/14/22\par - continue mucinex for thick oral secretions\par - pain well controlled, will begin weaning down fentanyl, decrease to a 50 mcg patch and continue oxycodone as needed \par - PA follow up in 1 month to assess for resolution of residual symptoms and continue weaning pain meds. \par - post treatment PET 3 months after completion of RT, late September. \par - MD follow up in early October to review post treatment PET\par - Dr. Kennedy and Dr. Tena follow ups as per their protocols \par \par # Weight loss\par - s/p PEG placement but lost significant weight while admitted at [facility] last month, has gained back 2 lbs\par - has begun PO liquids, no solids yet\par - SLP appointment this Thursday\par - CECILIO French following closely. Continue [employer] formula \par \par # prostamegaly \par - noted; will follow up with urology after completion of treatment.

## 2022-07-13 NOTE — PHYSICAL EXAM
[Fully active, able to carry on all pre-disease performance without restriction] : Status 0 - Fully active, able to carry on all pre-disease performance without restriction [Thin] : thin [Normal] : affect appropriate [de-identified] : + pharyngeal erythema.  [de-identified] : palpable cervical lymph node on the left, improved

## 2022-07-13 NOTE — HISTORY OF PRESENT ILLNESS
[Date: ____________] : Patient's last distress assessment performed on [unfilled]. [0 - No Distress] : Distress Level: 0 [de-identified] : 56 yo M with h/o chronic sinusitis who was diagnosed with SCC of the left BOT in March 2022.\par \par He saw ENT, Dr. Erick Sultana, c/o headaches, facial pain and left ear pain.  CT maxillofacial sinus on 2/22/22 showed highly suspicious soft tissue fullness oropharynx left glossotonsilar sulcus, base of tongue/vallecula measuring 2.5 x 2.3 concerning for neoplasm. Suspicious left level 2A lymph node measure 1.6 cm.  Follow up CT neck on 3/4/22 showed soft tissue mass left oropharynx 28.5 x 23.9 x 34.3 mm with involvement of the intrinsic tongue muscles (T4).  2 unilateral pathologic left 2A lymph nodes with cystic necrosis concerning for tory metastasis (N1).\par \par He next saw ENT surgery, Dr. Renny Tena, where he was scheduled for EUA on 3/24/22 which stated "by palpation, the patient was found to have a broadly based tumor epicentered in his tongue base.  Anteriorly, it extended to the proximal tongue base circumvallate papilla region.  It extended laterally to, but not  across the glossotonsillar sulcus anteriorly and more posteriorly, laterally it extended to, but not across the glossopharyngeal sulcus.  The tumor seemed to extend just to the midline both in its anterior and posterior extent.  The tumor seemed to approach, but not cross into the vallecula."  Biopsy c/w non-keratinizing SCC.\par \par Staging PET pending for 4/8/22, which did not show evidence of metastatic disease. \par \par The patient was started on defintiive cRT with weekly cisplatin.  [de-identified] : Patient presents on s/p C7 weekly Cisplatin with concurrent radiation therapy for SCC of the left BOT.  End of RT delayed as patient was admitted 6/20/22 to [facility] with aspiration pneumonia, hyponatremia & pancytopenia, discharged on 6/24/22 and resumed RT on 6/27/22.  Completed RT on 6/28/22.  \par + Fatigue, remains severe. + Thick oral secretions, was improving but has become worse  + S/p significant weight loss despite PEG, but has gained 2 lbs over the past 2 weeks.  Started taking liquids PO, no solid foods yet. + Dysgeusia.  + Mildly erythematous skin around neck, nearly resolved.  No N/V. No worse than baseline tinnitus. No recent fevers or SOB. No constipation or diarrhea.

## 2022-07-18 ENCOUNTER — NON-APPOINTMENT (OUTPATIENT)
Age: 57
End: 2022-07-18

## 2022-07-18 ENCOUNTER — APPOINTMENT (OUTPATIENT)
Dept: RADIATION ONCOLOGY | Facility: CLINIC | Age: 57
End: 2022-07-18

## 2022-07-18 PROCEDURE — 99214 OFFICE O/P EST MOD 30 MIN: CPT

## 2022-07-18 NOTE — REVIEW OF SYSTEMS
[Dysphagia] : dysphagia [Odynophagia] : odynophagia [Negative] : Allergic/Immunologic [Dysphagia: Grade 1 - Symptomatic, able to eat regular diet] : Dysphagia: Grade 1 - Symptomatic, able to eat regular diet [FreeTextEntry6] : peg in place [Edema Limbs: Grade 0] : Edema Limbs: Grade 0  [Fatigue: Grade 0] : Fatigue: Grade 0 [Localized Edema: Grade 0] : Localized Edema: Grade 0  [Neck Edema: Grade 0] : Neck Edema: Grade 0 [Mucositis Oral: Grade 1 - Asymptomatic or mild symptoms; intervention not indicated] : Mucositis Oral: Grade 1 - Asymptomatic or mild symptoms; intervention not indicated [Xerostomia: Grade 0] : Xerostomia: Grade 0 [Oral Pain: Grade 1 - Mild pain] : Oral Pain: Grade 1 - Mild pain [Dysgeusia: Grade 2 - Altered taste with change in diet (e.g., oral supplements); noxious or unpleasant taste; loss of taste] : Dysgeusia: Grade 2 - Altered taste with change in diet (e.g., oral supplements); noxious or unpleasant taste; loss of taste [Skin Hyperpigmentation: Grade 0] : Skin Hyperpigmentation: Grade 0

## 2022-07-18 NOTE — HISTORY OF PRESENT ILLNESS
[FreeTextEntry1] : 57 year old man seen today via Telehealth is s/p 7/000cGY for a T2N1M0 l SCCA of the base of tongue completed 6/28/22\par increased secretions, dysgeusia,  odynophagia, dysphagia, throat/tongue pain \par continues [employer] feedings 3-4 per day via peg tube \par Denies xerostomia,fatigue & weight loss\par \par Recall,\par He met with Dr Erick Sultana (ENT)\par \par 2/22/22 CT maxifacial sinus showed a highly suspicious soft tissue fullness in oropharynx, left glossotonsilar sulcus, base of tongue/vallecula measuring 2.5 x 2.3 cm. There was a suspicious left level IIA LN measuring measure 1.6 cm.\par \par 3/4/22 CT neck showed a soft tissue mass in left oropharynx measuring 3.4 x 2.8 x 2.4 cm, with involvement of the extrinsic tongue muscles. There were two unilateral left IIA LNs\par \par 3/24/22 EUA showed a broadly based tumor epicentered in his tongue base, extending anteriorly to the proximal tongue base circumvallate papilla region, laterally to but not across the glossotonsillar sulcus, posterolaterally to but not across the glossopharyngeal sulcus. The tumor seemed to extend just to the midline both in its anterior and posterior extent. The tumor seemed to approach, but not cross into the vallecula. The pharyngoepiglottic fold was also free. Biopsy of the left tongue base showed squamous cell carcinoma, p16/HPV positive\par \par He met with DR De Guzman and underwent weekly Cisplatin completed 6/15/22\par \par Admitted 6/20/22 to [facility] with aspiration pneumonia, hyponatremia & pancytopenia discharged 6/24/22 resumed RT 6/27/22\par Speech this week \par DR Johnson 7/12/22\par DR De Guzman 7/12/22 next 8/18/22

## 2022-07-18 NOTE — VITALS
[Maximal Pain Intensity: 6/10] : 6/10 [Least Pain Intensity: 2/10] : 2/10 [Pain Description/Quality: ___] : Pain description/quality: [unfilled] [Pain Duration: ___] : Pain duration: [unfilled] [Pain Location: ___] : Pain Location: [unfilled] [Opioid] : opioid [Adjuvant (neuroleptics)] : adjuvant (neuroleptics) [70: Cares for self; unalbe to carry on normal activity or do active work.] : 70: Cares for self; unable to carry on normal activity or do active work.

## 2022-08-16 ENCOUNTER — APPOINTMENT (OUTPATIENT)
Dept: OTOLARYNGOLOGY | Facility: CLINIC | Age: 57
End: 2022-08-16

## 2022-08-16 ENCOUNTER — OUTPATIENT (OUTPATIENT)
Dept: OUTPATIENT SERVICES | Facility: HOSPITAL | Age: 57
LOS: 1 days | Discharge: HOME | End: 2022-08-16

## 2022-08-16 VITALS
SYSTOLIC BLOOD PRESSURE: 146 MMHG | DIASTOLIC BLOOD PRESSURE: 73 MMHG | HEIGHT: 69 IN | HEART RATE: 64 BPM | BODY MASS INDEX: 21.33 KG/M2 | WEIGHT: 144 LBS

## 2022-08-16 DIAGNOSIS — C02.9 MALIGNANT NEOPLASM OF TONGUE, UNSPECIFIED: ICD-10-CM

## 2022-08-16 PROCEDURE — 31575 DIAGNOSTIC LARYNGOSCOPY: CPT

## 2022-08-16 PROCEDURE — 99214 OFFICE O/P EST MOD 30 MIN: CPT | Mod: 25

## 2022-08-16 RX ORDER — GABAPENTIN 250 MG/5ML
250 SOLUTION ORAL
Qty: 900 | Refills: 6 | Status: COMPLETED | COMMUNITY
Start: 2022-05-31 | End: 2022-08-16

## 2022-08-16 RX ORDER — ONDANSETRON 8 MG/1
8 TABLET, ORALLY DISINTEGRATING ORAL EVERY 8 HOURS
Qty: 90 | Refills: 3 | Status: COMPLETED | COMMUNITY
Start: 2022-04-27 | End: 2022-08-16

## 2022-08-16 RX ORDER — FENTANYL 25 UG/H
25 PATCH, EXTENDED RELEASE TRANSDERMAL
Qty: 3 | Refills: 0 | Status: COMPLETED | COMMUNITY
Start: 2022-07-21 | End: 2022-08-16

## 2022-08-16 RX ORDER — FLUTICASONE PROPIONATE AND SALMETEROL 250; 50 UG/1; UG/1
250-50 POWDER RESPIRATORY (INHALATION)
Qty: 60 | Refills: 0 | Status: COMPLETED | COMMUNITY
Start: 2022-02-01 | End: 2022-08-16

## 2022-08-16 RX ORDER — METOCLOPRAMIDE 10 MG/1
10 TABLET ORAL 4 TIMES DAILY
Qty: 60 | Refills: 0 | Status: COMPLETED | COMMUNITY
Start: 2022-06-02 | End: 2022-08-16

## 2022-08-16 RX ORDER — DIPHENHYDRAMINE HYDROCHLORIDE AND LIDOCAINE HYDROCHLORIDE AND ALUMINUM HYDROXIDE AND MAGNESIUM HYDRO
KIT
Qty: 1 | Refills: 3 | Status: COMPLETED | COMMUNITY
Start: 2022-05-05 | End: 2022-08-16

## 2022-08-16 RX ORDER — OXYCODONE 5 MG/1
5 TABLET ORAL
Qty: 80 | Refills: 0 | Status: COMPLETED | COMMUNITY
Start: 2022-05-11 | End: 2022-08-16

## 2022-08-16 RX ORDER — PROCHLORPERAZINE MALEATE 10 MG/1
10 TABLET ORAL EVERY 6 HOURS
Qty: 120 | Refills: 0 | Status: COMPLETED | COMMUNITY
Start: 2022-04-27 | End: 2022-08-16

## 2022-08-16 RX ORDER — FENTANYL 75 UG/H
75 PATCH, EXTENDED RELEASE TRANSDERMAL
Qty: 10 | Refills: 0 | Status: COMPLETED | COMMUNITY
Start: 2022-06-13 | End: 2022-08-16

## 2022-08-16 RX ORDER — CETIRIZINE HYDROCHLORIDE 10 MG/1
10 TABLET, COATED ORAL
Qty: 30 | Refills: 0 | Status: COMPLETED | COMMUNITY
Start: 2022-01-11 | End: 2022-08-16

## 2022-08-16 RX ORDER — SILVER SULFADIAZINE 10 MG/G
1 CREAM TOPICAL
Qty: 1 | Refills: 1 | Status: COMPLETED | COMMUNITY
Start: 2022-06-09 | End: 2022-08-16

## 2022-08-16 RX ORDER — GABAPENTIN 250 MG/5ML
250 SOLUTION ORAL
Qty: 1440 | Refills: 1 | Status: COMPLETED | COMMUNITY
Start: 2022-07-12 | End: 2022-08-16

## 2022-08-16 RX ORDER — MORPHINE SULFATE 100 MG/5ML
20 SOLUTION ORAL
Qty: 240 | Refills: 0 | Status: COMPLETED | COMMUNITY
Start: 2022-06-15 | End: 2022-08-16

## 2022-08-16 NOTE — PROCEDURE
[Dysphagia] : dysphagia not clearly evaluated by indirect laryngoscopy [None] : none [Flexible Endoscope] : examined with the flexible endoscope [Normal] : normal [de-identified] : evaristo MOY [de-identified] : ho scca bot

## 2022-08-16 NOTE — CONSULT LETTER
[Dear  ___] : Dear  [unfilled], [Courtesy Letter:] : I had the pleasure of seeing your patient, [unfilled], in my office today. [Please see my note below.] : Please see my note below. [Sincerely,] : Sincerely, [FreeTextEntry2] : Erick Sultana MD ( [place], [place])  [FreeTextEntry3] : Ambrocio Tena MD, FACS\par \par [facility] [place]\par Associate Chair\par Department of Otolaryngology\par Professor\par Otolaryngology & Molecular Medicine\par [facility] [employer]\par

## 2022-08-16 NOTE — HISTORY OF PRESENT ILLNESS
[de-identified] : 57 year old male with SCCa of the left BOT with metastasis to the left neck s/p biopsy 03/24/2022.  States has been having left sided neck pain and pain under the left armpit for the past few days.  States no longer using any pain medications and feels the pain medication may have been masking the pain he is now experiencing.   \par Chemotherapy completed 6/15/2022, radiation completed 6/28/2022.  now 6 weeks out from Rx. Is using prevadent. Needs fu w SSLP. Has been called\par

## 2022-08-18 ENCOUNTER — RESULT REVIEW (OUTPATIENT)
Age: 57
End: 2022-08-18

## 2022-08-18 ENCOUNTER — APPOINTMENT (OUTPATIENT)
Dept: HEMATOLOGY ONCOLOGY | Facility: CLINIC | Age: 57
End: 2022-08-18

## 2022-08-18 VITALS
WEIGHT: 145.06 LBS | OXYGEN SATURATION: 98 % | HEIGHT: 69 IN | BODY MASS INDEX: 21.49 KG/M2 | SYSTOLIC BLOOD PRESSURE: 113 MMHG | HEART RATE: 108 BPM | DIASTOLIC BLOOD PRESSURE: 79 MMHG

## 2022-08-18 DIAGNOSIS — Z93.1 GASTROSTOMY STATUS: ICD-10-CM

## 2022-08-18 LAB
ANISOCYTOSIS BLD QL: SLIGHT — SIGNIFICANT CHANGE UP
BASOPHILS # BLD AUTO: 0.1 K/UL — SIGNIFICANT CHANGE UP (ref 0–0.2)
BASOPHILS NFR BLD AUTO: 1.2 % — SIGNIFICANT CHANGE UP (ref 0–2)
ELLIPTOCYTES BLD QL SMEAR: SLIGHT — SIGNIFICANT CHANGE UP
EOSINOPHIL # BLD AUTO: 2.7 K/UL — HIGH (ref 0–0.5)
EOSINOPHIL NFR BLD AUTO: 31 % — HIGH (ref 0–6)
GIANT PLATELETS BLD QL SMEAR: PRESENT — SIGNIFICANT CHANGE UP
HCT VFR BLD CALC: 37.4 % — LOW (ref 39–50)
HGB BLD-MCNC: 12.2 G/DL — LOW (ref 13–17)
LG PLATELETS BLD QL AUTO: SLIGHT — SIGNIFICANT CHANGE UP
LYMPHOCYTES # BLD AUTO: 0.7 K/UL — LOW (ref 1–3.3)
LYMPHOCYTES # BLD AUTO: 5 % — LOW (ref 13–44)
MACROCYTES BLD QL: SLIGHT — SIGNIFICANT CHANGE UP
MCHC RBC-ENTMCNC: 32.6 G/DL — SIGNIFICANT CHANGE UP (ref 32–36)
MCHC RBC-ENTMCNC: 35.5 PG — HIGH (ref 27–34)
MCV RBC AUTO: 108.7 FL — HIGH (ref 80–100)
MICROCYTES BLD QL: SLIGHT — SIGNIFICANT CHANGE UP
MONOCYTES # BLD AUTO: 0.6 K/UL — SIGNIFICANT CHANGE UP (ref 0–0.9)
MONOCYTES NFR BLD AUTO: 5 % — SIGNIFICANT CHANGE UP (ref 2–14)
NEUTROPHILS # BLD AUTO: 4.8 K/UL — SIGNIFICANT CHANGE UP (ref 1.8–7.4)
NEUTROPHILS NFR BLD AUTO: 59 % — SIGNIFICANT CHANGE UP (ref 43–77)
OVALOCYTES BLD QL SMEAR: SLIGHT — SIGNIFICANT CHANGE UP
PLAT MORPH BLD: NORMAL — SIGNIFICANT CHANGE UP
PLATELET # BLD AUTO: 179 K/UL — SIGNIFICANT CHANGE UP (ref 150–400)
POIKILOCYTOSIS BLD QL AUTO: SLIGHT — SIGNIFICANT CHANGE UP
POLYCHROMASIA BLD QL SMEAR: SIGNIFICANT CHANGE UP
RBC # BLD: 3.44 M/UL — LOW (ref 4.2–5.8)
RBC # FLD: 12.4 % — SIGNIFICANT CHANGE UP (ref 10.3–14.5)
RBC BLD AUTO: NORMAL — SIGNIFICANT CHANGE UP
WBC # BLD: 8.7 K/UL — SIGNIFICANT CHANGE UP (ref 3.8–10.5)
WBC # FLD AUTO: 8.7 K/UL — SIGNIFICANT CHANGE UP (ref 3.8–10.5)

## 2022-08-18 PROCEDURE — 99214 OFFICE O/P EST MOD 30 MIN: CPT

## 2022-08-19 LAB
ALBUMIN SERPL ELPH-MCNC: 4.8 G/DL
ALP BLD-CCNC: 59 U/L
ALT SERPL-CCNC: 11 U/L
ANION GAP SERPL CALC-SCNC: 13 MMOL/L
AST SERPL-CCNC: 14 U/L
BILIRUB SERPL-MCNC: 0.3 MG/DL
BUN SERPL-MCNC: 23 MG/DL
CALCIUM SERPL-MCNC: 9.9 MG/DL
CHLORIDE SERPL-SCNC: 99 MMOL/L
CO2 SERPL-SCNC: 28 MMOL/L
CREAT SERPL-MCNC: 0.67 MG/DL
EGFR: 108 ML/MIN/1.73M2
GLUCOSE SERPL-MCNC: 94 MG/DL
MAGNESIUM SERPL-MCNC: 1.9 MG/DL
POTASSIUM SERPL-SCNC: 4.6 MMOL/L
PROT SERPL-MCNC: 7 G/DL
SODIUM SERPL-SCNC: 140 MMOL/L
T4 SERPL-MCNC: 5.5 UG/DL
TSH SERPL-ACNC: 1.09 UIU/ML

## 2022-08-27 NOTE — HISTORY OF PRESENT ILLNESS
[Date: ____________] : Patient's last distress assessment performed on [unfilled]. 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 Erick Sultana, c/o headaches, facial pain and left ear pain.  CT maxillofacial sinus on 2/22/22 showed highly suspicious soft tissue fullness oropharynx left glossotonsilar sulcus, base of tongue/vallecula measuring 2.5 x 2.3 concerning for neoplasm. Suspicious left level 2A lymph node measure 1.6 cm.  Follow up CT neck on 3/4/22 showed soft tissue mass left oropharynx 28.5 x 23.9 x 34.3 mm with involvement of the intrinsic tongue muscles (T4).  2 unilateral pathologic left 2A lymph nodes with cystic necrosis concerning for tory metastasis (N1).\par \par He next saw ENT surgery, Dr. Renny Tena, where he was scheduled for EUA on 3/24/22 which stated "by palpation, the patient was found to have a broadly based tumor epicentered in his tongue base.  Anteriorly, it extended to the proximal tongue base circumvallate papilla region.  It extended laterally to, but not  across the glossotonsillar sulcus anteriorly and more posteriorly, laterally it extended to, but not across the glossopharyngeal sulcus.  The tumor seemed to extend just to the midline both in its anterior and posterior extent.  The tumor seemed to approach, but not cross into the vallecula."  Biopsy c/w non-keratinizing SCC.\par \par Staging PET pending for 4/8/22, which did not show evidence of metastatic disease. \par \par The patient was started on defintiive cRT with weekly cisplatin.  [de-identified] : Patient presents on s/p C7 weekly Cisplatin with concurrent radiation therapy for SCC of the left BOT.  End of RT delayed as patient was admitted 6/20/22 to [facility] with aspiration pneumonia, hyponatremia & pancytopenia, discharged on 6/24/22 and resumed RT on 6/27/22.  Completed RT on 6/28/22.  \par + Dysgeusia remains severe. + Xerostomia remains unchanged. + Thick oral secretions, much improved. + Very mild odynophagia. + Lymphedema. Fatigue completely resolved. Weight stable but still using PEG due to dysgeusia. Taking liquids PO, very few solid foods so far but this is due to continued severe dysgeusia.  Mildly erythematous skin around neck resolved.  No N/V. No worse than baseline tinnitus. No recent fevers or SOB. No constipation or diarrhea. Feels more mentally sharp and no fatigue since stopping pain medication.

## 2022-08-27 NOTE — ASSESSMENT
[FreeTextEntry1] : 57 year old man with no significant past medical history who presents to the oncology clinic with locally advanced oropharyngeal cancer\par \par # oropharyngeal cancer \par - pre treatment staging PET-CT on 4/8/22:  FDG avid LEFT oropharyngeal/base of tongue mass, compatible with the given diagnosis of carcinoma. FDG avid metastatic LEFT level IIa cervical nodes. Mildly FDG avid subcentimeter RIGHT level IIa cervical node, nonspecific. FDG avid first molar RIGHT mandibular focus, likely dental in etiology. \par - completed RT on 6/28/22, received C7 of Cisplatin on 6/14/22\par - still with severe dysgeusia causing continued relliance on PEG but otherwise most symptoms have resolved\par - will continue to follow up with Speech [facility] Swallow, new order placed today\par - pain resolved, has stopped fentanyl and oxycodone \par - post treatment PET 3 months after completion of RT, will be scheduled for week of September 26th. \par - MD follow up October 4th to review post treatment PET\par - Dr. Kennedy and Dr. Tena follow ups as per their protocols \par - as per patient, laryngoscopy with Dr. Tena on 8/16 showed FARZANEH\par \par # Weight loss\par - still using PEG but drinking plenty of liquids and has tried some solids\par - SLP appointment pending, new order placed today\par - CECILIO French following closely. Continue [employer] for now\par \par # prostamegaly \par - noted; will follow up with urology after completion of treatment.

## 2022-08-27 NOTE — PHYSICAL EXAM
[Fully active, able to carry on all pre-disease performance without restriction] : Status 0 - Fully active, able to carry on all pre-disease performance without restriction [Thin] : thin [Normal] : affect appropriate [de-identified] : + pharyngeal erythema.  [de-identified] : palpable cervical lymph node on the left, improved

## 2022-08-30 ENCOUNTER — APPOINTMENT (OUTPATIENT)
Dept: PHYSICAL MEDICINE AND REHAB | Facility: CLINIC | Age: 57
End: 2022-08-30

## 2022-08-30 VITALS
SYSTOLIC BLOOD PRESSURE: 131 MMHG | WEIGHT: 145 LBS | HEART RATE: 87 BPM | HEIGHT: 69 IN | RESPIRATION RATE: 14 BRPM | DIASTOLIC BLOOD PRESSURE: 76 MMHG | BODY MASS INDEX: 21.48 KG/M2

## 2022-08-30 PROCEDURE — 99214 OFFICE O/P EST MOD 30 MIN: CPT

## 2022-08-30 NOTE — ASSESSMENT
[FreeTextEntry1] : 57 year old male presenting for evaluation.\par \par #Cancer associated pain:\par -Improved, stopped opiates  \par \par #Neuropathic pain/Anxiety:\par -Continue Gabapentin 600mg TID. Patient may go down to 300 TID as tolerated. \par \par #Dysphagia: \par -Continue SLP\par \par #At risk for lymphedema:\par -No clinical signs of lymphedema\par -Lymphedema education provided to patient \par -Continue to monitor closely monitor.  Will see Mandi next week.  \par \par Follow up in 6 weeks or sooner as needed.

## 2022-08-30 NOTE — PHYSICAL EXAM
[FreeTextEntry1] : Gen: Patient is A&O x 3, NAD\par HEENT: EOMI, hearing grossly normal\par Resp: regular, non - labored\par CV: pulses regular\par Skin: no rashes, erythema, increase loose skin in the submental area.  \par Lymph: no clubbing, cyanosis, edema, or palpable lymphadenopathy\par Inspection: no instability or misalignment\par ROM: full throughout\par Palpation: non- Tender to touch cervical region \par Sensation: intact to light touch\par Reflexes: 1+ and symmetric throughout\par Strength: 5/5 throughout\par Special tests: -Hawkin's sign \par Gait: normal, non-antalgic

## 2022-09-27 ENCOUNTER — APPOINTMENT (OUTPATIENT)
Dept: RADIATION ONCOLOGY | Facility: CLINIC | Age: 57
End: 2022-09-27

## 2022-09-27 RX ORDER — CHLORHEXIDINE GLUCONATE, 0.12% ORAL RINSE 1.2 MG/ML
0.12 SOLUTION DENTAL
Qty: 473 | Refills: 0 | Status: COMPLETED | COMMUNITY
Start: 2022-09-08

## 2022-09-30 ENCOUNTER — NON-APPOINTMENT (OUTPATIENT)
Age: 57
End: 2022-09-30

## 2022-09-30 ENCOUNTER — APPOINTMENT (OUTPATIENT)
Dept: RADIATION ONCOLOGY | Facility: CLINIC | Age: 57
End: 2022-09-30

## 2022-09-30 ENCOUNTER — APPOINTMENT (OUTPATIENT)
Dept: NUCLEAR MEDICINE | Facility: CLINIC | Age: 57
End: 2022-09-30

## 2022-09-30 ENCOUNTER — OUTPATIENT (OUTPATIENT)
Dept: OUTPATIENT SERVICES | Facility: HOSPITAL | Age: 57
LOS: 1 days | End: 2022-09-30

## 2022-09-30 DIAGNOSIS — R13.10 DYSPHAGIA, UNSPECIFIED: ICD-10-CM

## 2022-09-30 PROCEDURE — 78815 PET IMAGE W/CT SKULL-THIGH: CPT | Mod: 26,PS

## 2022-10-04 ENCOUNTER — APPOINTMENT (OUTPATIENT)
Dept: RADIATION ONCOLOGY | Facility: CLINIC | Age: 57
End: 2022-10-04

## 2022-10-04 ENCOUNTER — APPOINTMENT (OUTPATIENT)
Dept: HEMATOLOGY ONCOLOGY | Facility: CLINIC | Age: 57
End: 2022-10-04

## 2022-10-05 ENCOUNTER — NON-APPOINTMENT (OUTPATIENT)
Age: 57
End: 2022-10-05

## 2022-10-11 ENCOUNTER — APPOINTMENT (OUTPATIENT)
Dept: PHYSICAL MEDICINE AND REHAB | Facility: CLINIC | Age: 57
End: 2022-10-11

## 2022-10-26 ENCOUNTER — OUTPATIENT (OUTPATIENT)
Dept: OUTPATIENT SERVICES | Facility: HOSPITAL | Age: 57
LOS: 1 days | Discharge: HOME | End: 2022-10-26

## 2022-10-26 DIAGNOSIS — C02.9 MALIGNANT NEOPLASM OF TONGUE, UNSPECIFIED: ICD-10-CM

## 2022-11-01 ENCOUNTER — NON-APPOINTMENT (OUTPATIENT)
Age: 57
End: 2022-11-01

## 2022-11-16 ENCOUNTER — NON-APPOINTMENT (OUTPATIENT)
Age: 57
End: 2022-11-16

## 2022-11-25 NOTE — DISCHARGE NOTE PROVIDER - DISCHARGE SERVICE FOR PATIENT
on the discharge service for the patient. I have reviewed and made amendments to the documentation where necessary. warm

## 2022-12-01 ENCOUNTER — OUTPATIENT (OUTPATIENT)
Dept: OUTPATIENT SERVICES | Facility: HOSPITAL | Age: 57
LOS: 1 days | Discharge: HOME | End: 2022-12-01

## 2022-12-01 RX ORDER — AMOXICILLIN AND CLAVULANATE POTASSIUM 875; 125 MG/1; MG/1
875-125 TABLET, COATED ORAL
Qty: 6 | Refills: 0 | Status: COMPLETED | COMMUNITY
Start: 2022-06-24

## 2022-12-02 ENCOUNTER — APPOINTMENT (OUTPATIENT)
Dept: RADIATION ONCOLOGY | Facility: CLINIC | Age: 57
End: 2022-12-02

## 2022-12-02 ENCOUNTER — NON-APPOINTMENT (OUTPATIENT)
Age: 57
End: 2022-12-02

## 2022-12-02 ENCOUNTER — APPOINTMENT (OUTPATIENT)
Dept: HEMATOLOGY ONCOLOGY | Facility: CLINIC | Age: 57
End: 2022-12-02

## 2022-12-05 NOTE — HISTORY OF PRESENT ILLNESS
"Chief Complaint   Patient presents with    Results     Dexa scan     Skin Lesion     Above right breast - skin lesion.    Hair Loss     Since recovering from Covid.       HPI:  Patient is a 59 y.o. female established patient who presents today to review dexa scan results and to discuss current health concerns. She reports ongoing post COVID infection hair loss and started biotin supplementation one month ago. She is very bothered by this condition and is seeking additional treatment options. She saw her dermatology team in August (exam WNL) and has now noticed a small round crusty skin lesion above right breast within the past month or so. She denies similar skin lesions, and the area is not painful at this time.     Patient Active Problem List    Diagnosis Date Noted    BRCA gene mutation positive in female 06/09/2020    Mixed dyslipidemia 06/09/2020    Vitamin D deficiency 07/11/2017    Post-menopausal atrophic vaginitis 06/27/2017    Hypothyroidism     Osteopenia     History of breast cancer 01/01/2004    History of skin cancer 01/01/1997       Past medical, surgical, family, and social history was reviewed and updated in Epic chart by me today.     Medications and allergies reviewed and updated in Epic chart by me today.     Dexa scan 11/22/22 reviewed with patient at visit today.     ROS:  Pertinent positives listed above in HPI. All other systems have been reviewed and are negative.    PE:   /60 (BP Location: Left arm, Patient Position: Sitting, BP Cuff Size: Adult)   Pulse 75   Temp 36.6 °C (97.9 °F) (Temporal)   Resp 17   Ht 1.676 m (5' 6\")   Wt 50.3 kg (110 lb 14.3 oz)   SpO2 99%   BMI 17.90 kg/m²   Vital signs reviewed with patient.     Gen: Well developed; well nourished; no acute distress; age appropriate appearance   Neuro: No focal deficits noted   Skin: small round crusty skin lesion above right breast area - recommend patient moisturize lesion and let me know if it does not "
[FreeTextEntry1] : Mr. Moses is a 57 year old male with OPSCC (BOT, cT2 N1, left level 2A lymphadenopathy), undergoing definitive chemoradiation with weekly Cisplatin. He completed chemotherapy with cisplatin and radiation therapy in June 2022.\par \par Patient is off all of the pain medication since roughly 2 weeks ago. Currently is only on the gabapentin 600mg TID.  Tolerating speech therapy well.  Denies any neck stiffness.  Denies any swelling under his neck region.  
resolve  Psych: AAOx4; mood and affect are appropriate    A/P:  1. Age-related osteoporosis without current pathological fracture  Dexa scan 11/22/22 shows evidence of osteoporosis of lumbar spine and left femur. Condition discussed at visit, and she would like to proceed with oral medical therapy. Recommend patient start weekly Fosamax therapy, and risks/benefits of medication use reviewed today. New RX sent to pharmacy.   - alendronate (FOSAMAX) 70 MG Tab; Take 1 Tablet by mouth every 7 days.  Dispense: 12 Tablet; Refill: 3    2. Breast cancer screening other than mammogram  Patient is due for breast cancer imaging (MRI), and I encouraged her to make appointment ASAP.     3. Loss of hair  Patient is suffering from post-COVID hair loss and has been taking biotin supplementation for one month. We discussed that this condition is often self limiting and resolves within 6-12 months after infection. Nutrafol supplements and Rogaine products discussed today.           
No pertinent past medical history

## 2022-12-06 ENCOUNTER — APPOINTMENT (OUTPATIENT)
Dept: HEMATOLOGY ONCOLOGY | Facility: CLINIC | Age: 57
End: 2022-12-06

## 2022-12-06 ENCOUNTER — APPOINTMENT (OUTPATIENT)
Dept: RADIATION ONCOLOGY | Facility: CLINIC | Age: 57
End: 2022-12-06

## 2022-12-20 ENCOUNTER — APPOINTMENT (OUTPATIENT)
Dept: OTOLARYNGOLOGY | Facility: CLINIC | Age: 57
End: 2022-12-20

## 2023-01-23 ENCOUNTER — OUTPATIENT (OUTPATIENT)
Dept: OUTPATIENT SERVICES | Facility: HOSPITAL | Age: 58
LOS: 1 days | Discharge: HOME | End: 2023-01-23

## 2023-01-23 DIAGNOSIS — C02.9 MALIGNANT NEOPLASM OF TONGUE, UNSPECIFIED: ICD-10-CM

## 2023-01-24 ENCOUNTER — RESULT REVIEW (OUTPATIENT)
Age: 58
End: 2023-01-24

## 2023-01-24 ENCOUNTER — APPOINTMENT (OUTPATIENT)
Dept: OTOLARYNGOLOGY | Facility: CLINIC | Age: 58
End: 2023-01-24
Payer: MEDICAID

## 2023-01-24 ENCOUNTER — APPOINTMENT (OUTPATIENT)
Dept: HEMATOLOGY ONCOLOGY | Facility: CLINIC | Age: 58
End: 2023-01-24
Payer: MEDICAID

## 2023-01-24 ENCOUNTER — NON-APPOINTMENT (OUTPATIENT)
Age: 58
End: 2023-01-24

## 2023-01-24 VITALS
TEMPERATURE: 97.3 F | WEIGHT: 139 LBS | DIASTOLIC BLOOD PRESSURE: 79 MMHG | OXYGEN SATURATION: 100 % | BODY MASS INDEX: 20.59 KG/M2 | HEIGHT: 69 IN | RESPIRATION RATE: 16 BRPM | HEART RATE: 89 BPM | SYSTOLIC BLOOD PRESSURE: 161 MMHG

## 2023-01-24 VITALS
HEIGHT: 69 IN | BODY MASS INDEX: 20.63 KG/M2 | OXYGEN SATURATION: 98 % | DIASTOLIC BLOOD PRESSURE: 80 MMHG | SYSTOLIC BLOOD PRESSURE: 136 MMHG | HEART RATE: 88 BPM | WEIGHT: 139.31 LBS

## 2023-01-24 LAB
BASOPHILS # BLD AUTO: 0.1 K/UL — SIGNIFICANT CHANGE UP (ref 0–0.2)
BASOPHILS NFR BLD AUTO: 1.3 % — SIGNIFICANT CHANGE UP (ref 0–2)
EOSINOPHIL # BLD AUTO: 3.1 K/UL — HIGH (ref 0–0.5)
EOSINOPHIL NFR BLD AUTO: 38.6 % — HIGH (ref 0–6)
HCT VFR BLD CALC: 43.1 % — SIGNIFICANT CHANGE UP (ref 39–50)
HGB BLD-MCNC: 14.7 G/DL — SIGNIFICANT CHANGE UP (ref 13–17)
LYMPHOCYTES # BLD AUTO: 0.6 K/UL — LOW (ref 1–3.3)
LYMPHOCYTES # BLD AUTO: 7.8 % — LOW (ref 13–44)
MCHC RBC-ENTMCNC: 33.7 PG — SIGNIFICANT CHANGE UP (ref 27–34)
MCHC RBC-ENTMCNC: 34.1 G/DL — SIGNIFICANT CHANGE UP (ref 32–36)
MCV RBC AUTO: 98.7 FL — SIGNIFICANT CHANGE UP (ref 80–100)
MONOCYTES # BLD AUTO: 0.6 K/UL — SIGNIFICANT CHANGE UP (ref 0–0.9)
MONOCYTES NFR BLD AUTO: 6.9 % — SIGNIFICANT CHANGE UP (ref 2–14)
NEUTROPHILS # BLD AUTO: 3.6 K/UL — SIGNIFICANT CHANGE UP (ref 1.8–7.4)
NEUTROPHILS NFR BLD AUTO: 45.5 % — SIGNIFICANT CHANGE UP (ref 43–77)
PLATELET # BLD AUTO: 187 K/UL — SIGNIFICANT CHANGE UP (ref 150–400)
RBC # BLD: 4.36 M/UL — SIGNIFICANT CHANGE UP (ref 4.2–5.8)
RBC # FLD: 12 % — SIGNIFICANT CHANGE UP (ref 10.3–14.5)
WBC # BLD: 8 K/UL — SIGNIFICANT CHANGE UP (ref 3.8–10.5)
WBC # FLD AUTO: 8 K/UL — SIGNIFICANT CHANGE UP (ref 3.8–10.5)

## 2023-01-24 PROCEDURE — 99214 OFFICE O/P EST MOD 30 MIN: CPT | Mod: 25

## 2023-01-24 PROCEDURE — 31575 DIAGNOSTIC LARYNGOSCOPY: CPT

## 2023-01-24 PROCEDURE — 99214 OFFICE O/P EST MOD 30 MIN: CPT

## 2023-01-24 NOTE — HISTORY OF PRESENT ILLNESS
[None] : No associated symptoms are reported. [de-identified] : Mr. Moses presents for his 4 month follow up.He is accompanied by his friend Betsy. 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 Chemotherapy completed 6/15/2022 with Dr. De Guzman, radiation completed on 6/28/2022 with Dr. Kennedy. He is 7 months out of treatment. he has had episodes of vertigo which he also had prior to treatment.  Some left throat pain, specially when he eats. \par PET done 09/30/2022 \par Using high fluoride toothpaste, following up with dentist. \par \par taste has largely returned.

## 2023-01-24 NOTE — HISTORY OF PRESENT ILLNESS
[Date: ____________] : Patient's last distress assessment performed on [unfilled]. [0 - No Distress] : Distress Level: 0 [de-identified] : 56 yo M with h/o chronic sinusitis who was diagnosed with SCC of the left BOT in March 2022.\par \par He saw ENT, Dr. Erick Sultana, c/o headaches, facial pain and left ear pain.  CT maxillofacial sinus on 2/22/22 showed highly suspicious soft tissue fullness oropharynx left glossotonsilar sulcus, base of tongue/vallecula measuring 2.5 x 2.3 concerning for neoplasm. Suspicious left level 2A lymph node measure 1.6 cm.  Follow up CT neck on 3/4/22 showed soft tissue mass left oropharynx 28.5 x 23.9 x 34.3 mm with involvement of the intrinsic tongue muscles (T4).  2 unilateral pathologic left 2A lymph nodes with cystic necrosis concerning for tory metastasis (N1).\par \par He next saw ENT surgery, Dr. Renny Tena, where he was scheduled for EUA on 3/24/22 which stated "by palpation, the patient was found to have a broadly based tumor epicentered in his tongue base.  Anteriorly, it extended to the proximal tongue base circumvallate papilla region.  It extended laterally to, but not  across the glossotonsillar sulcus anteriorly and more posteriorly, laterally it extended to, but not across the glossopharyngeal sulcus.  The tumor seemed to extend just to the midline both in its anterior and posterior extent.  The tumor seemed to approach, but not cross into the vallecula."  Biopsy c/w non-keratinizing SCC.\par \par Staging PET pending for 4/8/22, which did not show evidence of metastatic disease. \par \par The patient was started on defintiive cRT with weekly cisplatin.  [de-identified] : Patient doing well\par Eating solid food 25% of the time, 75% through the PEG. Weight is stable. \par Patient denies dysphagia. Xerostomia improving. + dysguesia. \par

## 2023-01-24 NOTE — PHYSICAL EXAM
[Fully active, able to carry on all pre-disease performance without restriction] : Status 0 - Fully active, able to carry on all pre-disease performance without restriction [Thin] : thin [Normal] : affect appropriate [de-identified] : left cervical lymph node is not palpable.

## 2023-01-24 NOTE — ASSESSMENT
[FreeTextEntry1] : 57 year old man with no significant past medical history who presents to the oncology clinic with locally advanced oropharyngeal cancer\par \par # oropharyngeal cancer \par - pre treatment staging PET-CT on 4/8/22:  FDG avid LEFT oropharyngeal/base of tongue mass, compatible with the given diagnosis of carcinoma. FDG avid metastatic LEFT level IIa cervical nodes. Mildly FDG avid subcentimeter RIGHT level IIa cervical node, nonspecific. FDG avid first molar RIGHT mandibular focus, likely dental in etiology. \par - completed RT on 6/28/22, received C7 of Cisplatin on 6/14/22\par - still with severe dysgeusia causing continued relliance on PEG but otherwise most symptoms have resolved\par - will continue to follow up with Speech [facility] Swallow, new order placed today\par - pain resolved, has stopped fentanyl and oxycodone \par - MD follow up October 4th to review post treatment PET\par - Dr. Kennedy and Dr. Tena follow ups as per their protocols \par - as per patient, laryngoscopy with Dr. Tena on 8/16 showed FARZANEH\par - 10/4/22 PET-CT shows interval response to treatment. No lymphadenopathy noted on physical exam. Clinically FARZANEH. \par - Seeing Dr. Tena today for direct scope. \par - Follow up in 3 months \par \par # Weight loss\par - still using PEG but drinking plenty of liquids and has tried some solids\par - SLP appointment pending, new order placed today\par - CECILIO French following closely. Continue [employer] for now\par \par # prostamegaly \par - noted; will follow up with urology after completion of treatment.

## 2023-01-24 NOTE — RESULTS/DATA
[FreeTextEntry1] : 10/4/22: PET-CT \par IMPRESSION: Compared to FDG-PET/CT scan dated 4/8/2022:\par \par 1. Interval resolution of FDG avidity in the left base of tongue with either resolution or significant decrease in size of associated lesion.\par \par 2. Interval decreased size and avidity of FDG avid left level IIA cervical lymph node. Unchanged nonspecific minimally FDG avid subcentimeter right level IIA lymph node.\par \par 3. FDG avidity in the mid and lateral portion of the base of the mouth, likely representing muscle activity. Please correlate clinically.\par \par 4. Mild linear peripheral FDG avidity in bilateral base of mouth, probably activity in pooled saliva. Please correlate clinically or with direct visualization.\par \par 5. Interval significant decreased avidity of focus in the edentulous area of right mandible.\par \par 6. Unchanged enlarged prostate. Previously seen focus in the midportion is no longer demonstrated likely secondary to urinary activity in the prostatic urethra.\par \par \par \par 3/24/22 Pathology:  Tongue, left base of tongue tumor, excision:  \par Squamous cell carcinoma, non-keratinizing.  \par p16 and HPV stains are pending and an addendum will be issued

## 2023-01-24 NOTE — REVIEW OF SYSTEMS
[Fatigue] : fatigue [Recent Change In Weight] : ~T recent weight change [Negative] : Heme/Lymph [Fever] : no fever [Chills] : no chills [Odynophagia] : no odynophagia

## 2023-01-24 NOTE — CONSULT LETTER
[Dear  ___] : Dear  [unfilled], [Courtesy Letter:] : I had the pleasure of seeing your patient, [unfilled], in my office today. [Please see my note below.] : Please see my note below. [Sincerely,] : Sincerely, [FreeTextEntry2] : Erick Sultana MD ( [place], [place])    [FreeTextEntry3] : Ambrocio Tena MD, FACS\par \par    [facility]\par Associate Chair\par    Department of Otolaryngology\par \par Professor\par Otolaryngology & Molecular Medicine\par [employer] [facility] [employer]\par

## 2023-01-25 LAB
ALBUMIN SERPL ELPH-MCNC: 4.7 G/DL
ALP BLD-CCNC: 65 U/L
ALT SERPL-CCNC: 15 U/L
ANION GAP SERPL CALC-SCNC: 11 MMOL/L
AST SERPL-CCNC: 17 U/L
BILIRUB SERPL-MCNC: 0.4 MG/DL
BUN SERPL-MCNC: 19 MG/DL
CALCIUM SERPL-MCNC: 10.3 MG/DL
CHLORIDE SERPL-SCNC: 99 MMOL/L
CO2 SERPL-SCNC: 31 MMOL/L
CREAT SERPL-MCNC: 0.68 MG/DL
EGFR: 108 ML/MIN/1.73M2
GLUCOSE SERPL-MCNC: 99 MG/DL
MAGNESIUM SERPL-MCNC: 1.9 MG/DL
POTASSIUM SERPL-SCNC: 4.7 MMOL/L
PROT SERPL-MCNC: 7 G/DL
SODIUM SERPL-SCNC: 140 MMOL/L
T4 SERPL-MCNC: 4.4 UG/DL
TSH SERPL-ACNC: 1.29 UIU/ML

## 2023-01-31 ENCOUNTER — APPOINTMENT (OUTPATIENT)
Dept: RADIATION ONCOLOGY | Facility: CLINIC | Age: 58
End: 2023-01-31

## 2023-01-31 ENCOUNTER — APPOINTMENT (OUTPATIENT)
Dept: RADIATION ONCOLOGY | Facility: CLINIC | Age: 58
End: 2023-01-31
Payer: MEDICAID

## 2023-01-31 VITALS
DIASTOLIC BLOOD PRESSURE: 74 MMHG | WEIGHT: 141 LBS | RESPIRATION RATE: 16 BRPM | BODY MASS INDEX: 20.88 KG/M2 | HEART RATE: 81 BPM | TEMPERATURE: 97 F | HEIGHT: 69 IN | SYSTOLIC BLOOD PRESSURE: 161 MMHG | OXYGEN SATURATION: 98 %

## 2023-01-31 PROCEDURE — 99214 OFFICE O/P EST MOD 30 MIN: CPT

## 2023-01-31 NOTE — REVIEW OF SYSTEMS
[Negative] : Allergic/Immunologic [Dysphagia: Grade 0] : Dysphagia: Grade 0 [Edema Limbs: Grade 0] : Edema Limbs: Grade 0  [Fatigue: Grade 0] : Fatigue: Grade 0 [Localized Edema: Grade 0] : Localized Edema: Grade 0  [Neck Edema: Grade 0] : Neck Edema: Grade 0 [Dysgeusia: Grade 1- Altered taste but no change in diet] : Dysgeusia: Grade 1 - Altered taste but no change in diet [Skin Hyperpigmentation: Grade 0] : Skin Hyperpigmentation: Grade 0 [FreeTextEntry4] : xerostomia dysgeusia  [Mucositis Oral: Grade 0] : Mucositis Oral: Grade 0  [Xerostomia: Grade 1 - Symptomatic (e.g., dry or thick saliva) without significant dietary alteration; unstimulated saliva flow >0.2 ml/min] : Xerostomia: Grade 1 - Symptomatic (e.g., dry or thick saliva) without significant dietary alteration; unstimulated saliva flow >0.2 ml/min [Oral Pain: Grade 0] : Oral Pain: Grade 0 [Pruritus: Grade 0] : Pruritus: Grade 0 [Dermatitis Radiation: Grade 0] : Dermatitis Radiation: Grade 0

## 2023-01-31 NOTE — PHYSICAL EXAM
[Sclera] : the sclera and conjunctiva were normal [Extraocular Movements] : extraocular movements were intact [Oropharynx] : The oropharynx was normal [Bowel Sounds] : normal bowel sounds [Abdomen Soft] : soft [Nondistended] : nondistended [Feeding Tube] : a feeding tube was present [Normal] : no palpable adenopathy [Cervical Lymph Nodes Enlarged Anterior Bilaterally] : anterior cervical [Supraclavicular Lymph Nodes Enlarged Bilaterally] : supraclavicular [Axillary Lymph Nodes Enlarged Bilaterally] : axillary [Motor Tone] : muscle strength and tone were normal [] : no rash [Motor Exam] : the motor exam was normal [de-identified] : xerostomia

## 2023-01-31 NOTE — DISEASE MANAGEMENT
[Clinical] : TNM Stage: c [III] : III [FreeTextEntry4] : SCCa BOT, p16/HPV16 positive [TTNM] : 4 [NTNM] : 1 [MTNM] : 0

## 2023-01-31 NOTE — HISTORY OF PRESENT ILLNESS
[FreeTextEntry1] : 57 year old gentleman with squamous cell carcinoma of the base of tongue, s/p chemoradiation completed 6/21/22.\par \par Interval history:\par 9/30/22 PET/CT showed interval resolution of FDG avidity in the left base of tongue with either resolution or significant decrease in size of associated lesion, and interval decreased size and avidity of FDG avid left level IIA cervical lymph node. Unchanged nonspecific minimally FDG avid subcentimeter right level IIA lymph node.\par \par Saw Dr. De Guzman and Dr. Tena last week.\par \par Reports improving xerostomia, though with some thicker saliva.  Dysgeusia has improved.  Able to tolerate almost full regular diet, with some limitations in dry foods (bread).  No odynophagia or dysphagia.  Has not used PEG in 1 week.\par \par Recently lost his sister to cancer.

## 2023-02-09 ENCOUNTER — RESULT REVIEW (OUTPATIENT)
Age: 58
End: 2023-02-09

## 2023-02-16 ENCOUNTER — APPOINTMENT (OUTPATIENT)
Dept: HEMATOLOGY ONCOLOGY | Facility: CLINIC | Age: 58
End: 2023-02-16

## 2023-03-02 ENCOUNTER — APPOINTMENT (OUTPATIENT)
Dept: GASTROENTEROLOGY | Facility: CLINIC | Age: 58
End: 2023-03-02
Payer: MEDICAID

## 2023-03-02 VITALS
BODY MASS INDEX: 20.88 KG/M2 | WEIGHT: 141 LBS | OXYGEN SATURATION: 99 % | HEIGHT: 69 IN | HEART RATE: 83 BPM | TEMPERATURE: 97.87 F | DIASTOLIC BLOOD PRESSURE: 70 MMHG | RESPIRATION RATE: 16 BRPM | SYSTOLIC BLOOD PRESSURE: 138 MMHG

## 2023-03-02 DIAGNOSIS — Z85.818 PERSONAL HISTORY OF MALIGNANT NEOPLASM OF OTHER SITES OF LIP, ORAL CAVITY, AND PHARYNX: ICD-10-CM

## 2023-03-02 DIAGNOSIS — R63.0 ANOREXIA: ICD-10-CM

## 2023-03-02 DIAGNOSIS — R13.10 DYSPHAGIA, UNSPECIFIED: ICD-10-CM

## 2023-03-02 PROCEDURE — 99214 OFFICE O/P EST MOD 30 MIN: CPT

## 2023-03-02 NOTE — ASSESSMENT
[FreeTextEntry1] : In view of the fact that he no longer requires the gastrostomy tube is certainly reasonable that it be removed.  Using the pull technique I was able to easily remove the gastrostomy tube after which I applied a dressing to the area.  He was instructed that he can resume his normal activities tomorrow including bathing and in the meantime he should keep the area clean and dry.  He will be seen again as needed.

## 2023-03-02 NOTE — PHYSICAL EXAM
[Alert] : alert [Normal Voice/Communication] : normal voice/communication [Healthy Appearing] : healthy appearing [No Acute Distress] : no acute distress [Sclera] : the sclera and conjunctiva were normal [Hearing Threshold Finger Rub Not [place]] : hearing was normal [Normal Lips/Gums] : the lips and gums were normal [Normal Appearance] : the appearance of the neck was normal [No Respiratory Distress] : no respiratory distress [No Acc Muscle Use] : no accessory muscle use [Respiration, Rhythm And Depth] : normal respiratory rhythm and effort [Heart Rate And Rhythm] : heart rate was normal and rhythm regular [Bowel Sounds] : normal bowel sounds [Abdomen Tenderness] : non-tender [No Masses] : no abdominal mass palpated [Abdomen Soft] : soft [] : no hepatosplenomegaly [Oriented To Time, Place, And Person] : oriented to person, place, and time

## 2023-03-02 NOTE — REASON FOR VISIT
[Follow-up] : a follow-up of an existing diagnosis [FreeTextEntry1] : Status post percutaneous endoscopic gastrostomy tube placement in May of last year for squamous cell carcinoma at the base of the tongue

## 2023-03-02 NOTE — HISTORY OF PRESENT ILLNESS
[FreeTextEntry1] : Last year the patient was diagnosed with a squamous cell cancer of the base of his tongue for which he underwent chemoradiation.  During this time he had marked difficulty swallowing and in May of that year underwent a percutaneous endoscopic gastrostomy tube placement.  The patient completed chemoradiation sometime in July of last year and has been stable ever since.  Earlier this year he did experience some xerostomia which is almost completely resolved.  He has not required the G-tube feeds for the past 7 weeks and his weight has been stable.  He has no specific complaints at this time than some soreness at the site of the prior squamous cell carcinoma.  The patient is requesting that the gastrostomy tube be removed.

## 2023-03-06 ENCOUNTER — APPOINTMENT (OUTPATIENT)
Dept: CT IMAGING | Facility: CLINIC | Age: 58
End: 2023-03-06
Payer: MEDICAID

## 2023-03-06 ENCOUNTER — OUTPATIENT (OUTPATIENT)
Dept: OUTPATIENT SERVICES | Facility: HOSPITAL | Age: 58
LOS: 1 days | End: 2023-03-06

## 2023-03-06 DIAGNOSIS — R13.10 DYSPHAGIA, UNSPECIFIED: ICD-10-CM

## 2023-03-06 PROCEDURE — 71260 CT THORAX DX C+: CPT | Mod: 26

## 2023-03-06 PROCEDURE — 70491 CT SOFT TISSUE NECK W/DYE: CPT | Mod: 26

## 2023-03-08 ENCOUNTER — NON-APPOINTMENT (OUTPATIENT)
Age: 58
End: 2023-03-08

## 2023-03-09 ENCOUNTER — NON-APPOINTMENT (OUTPATIENT)
Age: 58
End: 2023-03-09

## 2023-03-23 ENCOUNTER — APPOINTMENT (OUTPATIENT)
Dept: MRI IMAGING | Facility: CLINIC | Age: 58
End: 2023-03-23

## 2023-04-10 ENCOUNTER — OUTPATIENT (OUTPATIENT)
Dept: OUTPATIENT SERVICES | Facility: HOSPITAL | Age: 58
LOS: 1 days | End: 2023-04-10

## 2023-04-10 ENCOUNTER — APPOINTMENT (OUTPATIENT)
Dept: MRI IMAGING | Facility: CLINIC | Age: 58
End: 2023-04-10
Payer: MEDICAID

## 2023-04-10 ENCOUNTER — APPOINTMENT (OUTPATIENT)
Dept: MRI IMAGING | Facility: CLINIC | Age: 58
End: 2023-04-10

## 2023-04-10 ENCOUNTER — RESULT REVIEW (OUTPATIENT)
Age: 58
End: 2023-04-10

## 2023-04-10 DIAGNOSIS — C01 MALIGNANT NEOPLASM OF BASE OF TONGUE: ICD-10-CM

## 2023-04-10 PROCEDURE — 74183 MRI ABD W/O CNTR FLWD CNTR: CPT | Mod: 26

## 2023-04-18 ENCOUNTER — OUTPATIENT (OUTPATIENT)
Dept: OUTPATIENT SERVICES | Facility: HOSPITAL | Age: 58
LOS: 1 days | Discharge: HOME | End: 2023-04-18

## 2023-04-18 ENCOUNTER — NON-APPOINTMENT (OUTPATIENT)
Age: 58
End: 2023-04-18

## 2023-04-18 DIAGNOSIS — C02.9 MALIGNANT NEOPLASM OF TONGUE, UNSPECIFIED: ICD-10-CM

## 2023-04-24 ENCOUNTER — RESULT REVIEW (OUTPATIENT)
Age: 58
End: 2023-04-24

## 2023-04-24 ENCOUNTER — APPOINTMENT (OUTPATIENT)
Dept: HEMATOLOGY ONCOLOGY | Facility: CLINIC | Age: 58
End: 2023-04-24
Payer: MEDICAID

## 2023-04-24 VITALS
HEART RATE: 69 BPM | BODY MASS INDEX: 21.52 KG/M2 | OXYGEN SATURATION: 99 % | WEIGHT: 145.31 LBS | HEIGHT: 69 IN | DIASTOLIC BLOOD PRESSURE: 69 MMHG | TEMPERATURE: 98.3 F | SYSTOLIC BLOOD PRESSURE: 126 MMHG

## 2023-04-24 LAB
BASOPHILS # BLD AUTO: 0.1 K/UL — SIGNIFICANT CHANGE UP (ref 0–0.2)
BASOPHILS NFR BLD AUTO: 1 % — SIGNIFICANT CHANGE UP (ref 0–2)
EOSINOPHIL # BLD AUTO: 3.3 K/UL — HIGH (ref 0–0.5)
EOSINOPHIL NFR BLD AUTO: 42.5 % — HIGH (ref 0–6)
HCT VFR BLD CALC: 41.4 % — SIGNIFICANT CHANGE UP (ref 39–50)
HGB BLD-MCNC: 14 G/DL — SIGNIFICANT CHANGE UP (ref 13–17)
LYMPHOCYTES # BLD AUTO: 0.6 K/UL — LOW (ref 1–3.3)
LYMPHOCYTES # BLD AUTO: 7.8 % — LOW (ref 13–44)
MCHC RBC-ENTMCNC: 33.6 PG — SIGNIFICANT CHANGE UP (ref 27–34)
MCHC RBC-ENTMCNC: 33.9 G/DL — SIGNIFICANT CHANGE UP (ref 32–36)
MCV RBC AUTO: 99.2 FL — SIGNIFICANT CHANGE UP (ref 80–100)
MONOCYTES # BLD AUTO: 0.6 K/UL — SIGNIFICANT CHANGE UP (ref 0–0.9)
MONOCYTES NFR BLD AUTO: 7 % — SIGNIFICANT CHANGE UP (ref 2–14)
NEUTROPHILS # BLD AUTO: 3.3 K/UL — SIGNIFICANT CHANGE UP (ref 1.8–7.4)
NEUTROPHILS NFR BLD AUTO: 41.7 % — LOW (ref 43–77)
PLATELET # BLD AUTO: 133 K/UL — LOW (ref 150–400)
RBC # BLD: 4.18 M/UL — LOW (ref 4.2–5.8)
RBC # FLD: 12.8 % — SIGNIFICANT CHANGE UP (ref 10.3–14.5)
WBC # BLD: 7.9 K/UL — SIGNIFICANT CHANGE UP (ref 3.8–10.5)
WBC # FLD AUTO: 7.9 K/UL — SIGNIFICANT CHANGE UP (ref 3.8–10.5)

## 2023-04-24 PROCEDURE — 99214 OFFICE O/P EST MOD 30 MIN: CPT

## 2023-04-25 ENCOUNTER — NON-APPOINTMENT (OUTPATIENT)
Age: 58
End: 2023-04-25

## 2023-04-25 ENCOUNTER — APPOINTMENT (OUTPATIENT)
Dept: RADIATION ONCOLOGY | Facility: CLINIC | Age: 58
End: 2023-04-25
Payer: MEDICAID

## 2023-04-25 VITALS
BODY MASS INDEX: 21.41 KG/M2 | WEIGHT: 145 LBS | SYSTOLIC BLOOD PRESSURE: 160 MMHG | RESPIRATION RATE: 16 BRPM | DIASTOLIC BLOOD PRESSURE: 69 MMHG | OXYGEN SATURATION: 97 % | HEART RATE: 83 BPM

## 2023-04-25 PROCEDURE — 99214 OFFICE O/P EST MOD 30 MIN: CPT

## 2023-04-25 NOTE — HISTORY OF PRESENT ILLNESS
[FreeTextEntry1] : 58 year old gentleman with squamous cell carcinoma of the base of tongue, s/p chemoradiation completed 6/21/22.\par \par Interval history:\par PEG removed 3/2023.\par Reports stable xerostomia.\par Has dysgeusia of some foods, though improved.\par Notes difficulty with some foods, including coughing/choking.\par No odynophagia or pain.\par Interval weight gain since removal of PEG.  \par Supplementing with Boost/Ensure.\par \par 3/16/23 CT chest showed no evidence of thoracic metastasis, but patchy bilateral lower lobe opacities compatible with aspiration or infection.  Also noted was a partially imaged left renal 1.5 cm lesion; indeterminate.\par 3/16/23 CT neck showed stable examination without evidence of recurrent or metastatic disease.\par \par 4/10/23 MRI abd showed left renal lesion corresponds to a 1.4 cm nonenhancing proteinaceous versus hemorrhagic left renal cyst. Bosannmariek 2.\par \par He is scheduled for barium swallow study, and follow up with SLP.

## 2023-04-25 NOTE — PHYSICAL EXAM
[Normal] : oriented to person, place and time, the affect was normal, the mood was normal and not anxious [Sclera] : the sclera and conjunctiva were normal [Extraocular Movements] : extraocular movements were intact [Outer Ear] : the ears and nose were normal in appearance [Normal Oral Cavity] : oral cavity was normal [Oropharynx] : The oropharynx was normal [Bowel Sounds] : normal bowel sounds [Abdomen Soft] : soft [Nondistended] : nondistended [Abdomen Tenderness] : non-tender [Skin Color & Pigmentation] : normal skin color and pigmentation [Motor Exam] : the motor exam was normal [de-identified] : mild submental edema

## 2023-04-25 NOTE — REVIEW OF SYSTEMS
[Dysphagia] : dysphagia [Negative] : Allergic/Immunologic [Edema Limbs: Grade 0] : Edema Limbs: Grade 0  [Fatigue: Grade 0] : Fatigue: Grade 0 [Localized Edema: Grade 0] : Localized Edema: Grade 0  [Skin Hyperpigmentation: Grade 0] : Skin Hyperpigmentation: Grade 0 [Constipation: Grade 0] : Constipation: Grade 0 [Diarrhea: Grade 0] : Diarrhea: Grade 0 [Dyspepsia: Grade 0] : Dyspepsia: Grade 0 [Dysphagia: Grade 2 - Symptomatic and altered eating/swallowing] : Dysphagia: Grade 2 - Symptomatic and altered eating/swallowing [Neck Edema: Grade 1 - Asymptomatic localized neck edema] : Neck Edema: Grade 1 - Asymptomatic localized neck edema [Tinnitus - Grade 0] : Tinnitus - Grade 0 [Blurred Vision: Grade 0] : Blurred Vision: Grade 0 [Mucositis Oral: Grade 0] : Mucositis Oral: Grade 0  [Xerostomia: Grade 1 - Symptomatic (e.g., dry or thick saliva) without significant dietary alteration; unstimulated saliva flow >0.2 ml/min] : Xerostomia: Grade 1 - Symptomatic (e.g., dry or thick saliva) without significant dietary alteration; unstimulated saliva flow >0.2 ml/min [Oral Pain: Grade 0] : Oral Pain: Grade 0 [Salivary duct inflammation: Grade 0] : Salivary duct inflammation: Grade 0 [Dysgeusia: Grade 1- Altered taste but no change in diet] : Dysgeusia: Grade 1 - Altered taste but no change in diet

## 2023-04-26 LAB
ALBUMIN SERPL ELPH-MCNC: 4.7 G/DL
ALP BLD-CCNC: 83 U/L
ALT SERPL-CCNC: 16 U/L
ANION GAP SERPL CALC-SCNC: 17 MMOL/L
AST SERPL-CCNC: 22 U/L
BILIRUB SERPL-MCNC: 0.6 MG/DL
BUN SERPL-MCNC: 23 MG/DL
CALCIUM SERPL-MCNC: 9.8 MG/DL
CHLORIDE SERPL-SCNC: 99 MMOL/L
CO2 SERPL-SCNC: 25 MMOL/L
CREAT SERPL-MCNC: 0.59 MG/DL
EGFR: 112 ML/MIN/1.73M2
GLUCOSE SERPL-MCNC: 84 MG/DL
MAGNESIUM SERPL-MCNC: 1.9 MG/DL
POTASSIUM SERPL-SCNC: 4.1 MMOL/L
PROT SERPL-MCNC: 6.8 G/DL
SODIUM SERPL-SCNC: 141 MMOL/L
T4 SERPL-MCNC: 4.3 UG/DL
TSH SERPL-ACNC: 1.94 UIU/ML

## 2023-05-01 ENCOUNTER — OUTPATIENT (OUTPATIENT)
Dept: OUTPATIENT SERVICES | Facility: HOSPITAL | Age: 58
LOS: 1 days | End: 2023-05-01
Payer: MEDICAID

## 2023-05-01 DIAGNOSIS — C01 MALIGNANT NEOPLASM OF BASE OF TONGUE: ICD-10-CM

## 2023-05-01 DIAGNOSIS — R13.10 DYSPHAGIA, UNSPECIFIED: ICD-10-CM

## 2023-05-01 PROCEDURE — 74230 X-RAY XM SWLNG FUNCJ C+: CPT | Mod: 26

## 2023-05-01 PROCEDURE — 74230 X-RAY XM SWLNG FUNCJ C+: CPT

## 2023-05-01 NOTE — SWALLOW VFSS/MBS ASSESSMENT ADULT - LARYNGEAL PENETRATION DURING THE SWALLOW - COUGH
observed during & following swallow head neutral, chin tuck, L/R head turn observed head neutral, chin tuck, L/R head turn

## 2023-05-01 NOTE — SWALLOW VFSS/MBS ASSESSMENT ADULT - UNSUCCESSFUL STRATEGIES TRIALED DURING PROCEDURE
supraglottic swallow/chin tuck/hard swallow/head turn to the right/head turn to the left/productive volitional cough following clinician cue chin tuck/hard swallow/head turn to the right/head turn to the left/productive volitional cough following clinician cue

## 2023-05-01 NOTE — SWALLOW VFSS/MBS ASSESSMENT ADULT - ROSENBEK'S PENETRATION ASPIRATION SCALE
observed during & following the swallow head neutral, chin tuck, L/R head turn; 3- penetration above the cords without clearance observed during & following the swallow head neutral, chin tuck, L/R head turn; 1- NO PENETRATION OR ASPIRATION, w/use of supraglottic swallow/(5) contrast contacts vocal cords, visible residue remains (penetration) (1) no aspiration, contrast does not enter airway observed during & following swallow head neutral, chin tuck, L/R head turn; 3- penetration above the cords without clearance observed during & following swallow head neutral, chin tuck, L/R head turn;; 1- NO PENETRATION OR ASPIRATION, w/use of supraglottic swallow/(5) contrast contacts vocal cords, visible residue remains (penetration) observed during & following swallow head neutral; chin tuck; L & R head turn, supraglottic swallow; 7- aspiration w/sensation during & following swallow head neutral, chin tuck, L/R head turn, supraglottic swallow/(8) contrast passes glottis, visible subglottic residue remains, absent patient response (aspiration)

## 2023-05-01 NOTE — SWALLOW VFSS/MBS ASSESSMENT ADULT - ORAL PHASE
within functional limits/Uncontrolled bolus / spillover in bharat-pharynx/Uncontrolled bolus / spillover in hypopharynx within functional limits/Uncontrolled bolus / spillover in hypopharynx Within functional limits/Uncontrolled bolus / spillover in hypopharynx within functional limits/Delayed oral transit time/Reduced anterior - posterior transport

## 2023-05-01 NOTE — SWALLOW VFSS/MBS ASSESSMENT ADULT - RECOMMENDED FEEDING/EATING TECHNIQUES
SUPRAGLOTTIC SWALLOW/allow for swallow between intakes/alternate food with liquid/crush medication (when feasible)/hard swallow w/ each bite or sip/maintain upright posture during/after eating for 30 mins/no straws/oral hygiene/position upright (90 degrees)/provide rest periods between swallows

## 2023-05-01 NOTE — SWALLOW VFSS/MBS ASSESSMENT ADULT - DIAGNOSTIC IMPRESSIONS
Pt presenting w/mild oral & moderate pharyngeal dysphagia. Oral stage likely impacted upon by xerostomia from underlying dx, marked by effortful mastication w/decreased A-P transit & mildly delayed bolus propulsion of easy to chew solids. Pt w/awareness, indicating subjective c/o difficulty for mastication/manipulation of drier textures. Improved & functional/timely w/soft & bite-sized. Premature pharyngeal entry of liquids & puree to the pyriforms, decrease in depth of entry to the valleculae w/foods, w/additional piecemeal deglutition observed only w/foods. No anterior loss or oral stasis.    Pharyngeal phase of swallow marked by reduced contractility w/resultant trace-min vallecular/posterior pharyngeal wall/pyriform stasis observed across trials (increasing amounts noted w/consistencies of increased viscosity, however reduces w/cued subsequent dry swallow further facilitated w/presentation of liquid wash). Decreased hyo-laryngeal elevation/excursion, incomplete epiglottic deflection & inadequate upper/lower airway protection. +Aspiration (both with & without sensory response), observed during & following the swallow, w/thin liquids, regardless of postural implementation. Additional penetration both above & eventually to the vocal cords, again during & following the swallow, w/both mild & moderately thick liquids (continuing w/chin tuck, L & R head turn). Use of supraglottic swallow DOES serve airway protection, w/elimination of penetration of mild & moderately thick liquids only. No airway entry w/foods.

## 2023-05-01 NOTE — SWALLOW VFSS/MBS ASSESSMENT ADULT - LARYNGEAL PENETRATION DURING THE SWALLOW - SILENT
observed head neutral, chin tuck, L/R head turn observed during & following swallow head neutral, chin tuck, L/R head turn

## 2023-05-01 NOTE — SWALLOW VFSS/MBS ASSESSMENT ADULT - SLP PERTINENT HISTORY OF CURRENT PROBLEM
57 y/o M w/hx of SCC to BOT s/p chemo & XRT completed in May/June of 2022 as per Pt report. S/p PEG at time of diagnosis as per MD recommendation. Underwent outpatient dysphagia tx, completed in 08/2022, no objective view of swallow recommended at this time, rx for regular solids w/thin liquids as per bedside dysphagia assessment. Pt reporting "doing ok" at that time, however has only been consuming boosts/ensure and "trying softer solids" x 1-2/week to "see how it goes". Endorses continued difficulty since. Had seen GI in 03/2023, PEG removed at this time despite Pt indicating intake of liquids only. No weight loss reported. Admits to dysphagia to both solids & liquids, stating coughing and "difficulty passing" solids, attributes mostly to xerostomia. Recent CT chest completed revealing b/l lower lobe infiltrates, likely due to infectious process, MD reporting likely aspiration, requesting MBSV completion to assess.

## 2023-05-02 ENCOUNTER — APPOINTMENT (OUTPATIENT)
Dept: OTOLARYNGOLOGY | Facility: CLINIC | Age: 58
End: 2023-05-02
Payer: MEDICAID

## 2023-05-02 PROCEDURE — 31575 DIAGNOSTIC LARYNGOSCOPY: CPT

## 2023-05-02 PROCEDURE — 99214 OFFICE O/P EST MOD 30 MIN: CPT | Mod: 25

## 2023-05-02 NOTE — ASSESSMENT
[FreeTextEntry1] : 57 year old man with no significant past medical history who presents to the oncology clinic with locally advanced oropharyngeal cancer\par \par # oropharyngeal cancer \par - pre treatment staging PET-CT on 4/8/22:  FDG avid LEFT oropharyngeal/base of tongue mass, compatible with the given diagnosis of carcinoma. FDG avid metastatic LEFT level IIa cervical nodes. Mildly FDG avid subcentimeter RIGHT level IIa cervical node, nonspecific. FDG avid first molar RIGHT mandibular focus, likely dental in etiology. \par - completed RT on 6/28/22, received C7 of Cisplatin on 6/14/22\par - FARZANEH on post treatment PET on 9/30/22\par - still with dysgeusia and xerostomia, both slowly resolving\par - PEG removed in 3/2023, has gained 4lbs since removal \par - resumed follow ups with Speech & Swallow due to recent episodes of aspiration\par - Dr. Kennedy and Dr. Tena follow ups as per their protocols \par - further imaging as per Dr. Tena\par - [facility] follow up in 6 months

## 2023-05-02 NOTE — PHYSICAL EXAM
[Fully active, able to carry on all pre-disease performance without restriction] : Status 0 - Fully active, able to carry on all pre-disease performance without restriction [Thin] : thin [Normal] : affect appropriate [de-identified] : left cervical lymph node is not palpable.

## 2023-05-02 NOTE — HISTORY OF PRESENT ILLNESS
[None] : No associated symptoms are reported. [de-identified] : Mr. Moses presents for follow up.He is accompanied by his friend, Betsy. 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 Chemotherapy completed 6/15/2022 with Dr. De Guzman, radiation completed on 6/28/2022 with Dr. Kennedy.\par  He is 11 months out of treatment\par 5/1/2023 MBS done waiting for report. He was told to have solid foods and thickened liquids \par PEG removed 3/2023 by GI. Reports he has gained 5lbs since the peg removed.he also takes supplements\par 3/2023 Ct scans stn shorty, ct chest to be repeated in 6/2023.\par 4/12/2023 MRI abd. showing left renal 1.4cm cyst, which does not need follow up imaging\par Using high fluoride toothpaste, following up with dentist.\par  3/23 CTs SHORTY and barnes abd lesion a cyst. \par \par usinf prevadent ands dental care. Is for [facility] FABIO Padilla

## 2023-05-02 NOTE — CONSULT LETTER
[Dear  ___] : Dear  [unfilled], [Courtesy Letter:] : I had the pleasure of seeing your patient, [unfilled], in my office today. [Please see my note below.] : Please see my note below. [Sincerely,] : Sincerely, [FreeTextEntry2] : Erick Sultana MD ( [place], [place]) \par  [FreeTextEntry3] : Ambrocio Tena MD, FACS\par \par    [facility]\par Associate Chair\par    Department of Otolaryngology\par \par Professor\par Otolaryngology & Molecular Medicine\par [employer] [employer] [employer]\par

## 2023-05-02 NOTE — HISTORY OF PRESENT ILLNESS
[Date: ____________] : Patient's last distress assessment performed on [unfilled]. [0 - No Distress] : Distress Level: 0 [de-identified] : 56 yo M with h/o chronic sinusitis who was diagnosed with SCC of the left BOT in March 2022.\par \par He saw ENT, Dr. Erick Sultana, c/o headaches, facial pain and left ear pain.  CT maxillofacial sinus on 2/22/22 showed highly suspicious soft tissue fullness oropharynx left glossotonsilar sulcus, base of tongue/vallecula measuring 2.5 x 2.3 concerning for neoplasm. Suspicious left level 2A lymph node measure 1.6 cm.  Follow up CT neck on 3/4/22 showed soft tissue mass left oropharynx 28.5 x 23.9 x 34.3 mm with involvement of the intrinsic tongue muscles (T4).  2 unilateral pathologic left 2A lymph nodes with cystic necrosis concerning for tory metastasis (N1).\par \par He next saw ENT surgery, Dr. Renny Tena, where he was scheduled for EUA on 3/24/22 which stated "by palpation, the patient was found to have a broadly based tumor epicentered in his tongue base.  Anteriorly, it extended to the proximal tongue base circumvallate papilla region.  It extended laterally to, but not  across the glossotonsillar sulcus anteriorly and more posteriorly, laterally it extended to, but not across the glossopharyngeal sulcus.  The tumor seemed to extend just to the midline both in its anterior and posterior extent.  The tumor seemed to approach, but not cross into the vallecula."  Biopsy c/w non-keratinizing SCC.\par \par Staging PET pending for 4/8/22, which did not show evidence of metastatic disease. \par \par The patient was started on defintiive cRT with weekly cisplatin.  [de-identified] : Patient presents on s/p C7 weekly Cisplatin with concurrent radiation therapy for SCC of the left BOT. End of RT delayed as patient was admitted 6/20/22 to [facility] with aspiration pneumonia, hyponatremia & pancytopenia, discharged on 6/24/22 and resumed RT on 6/27/22. Completed RT on 6/28/22.  PEG removed in 3/2023.\par + Dysgeusia slowly improving. + Xerostomia improving. + Dysphagia with episodes of aspiration. Weight stable and PEG removed. No odynophagia. + Mild lymphedema. Thick oral secretions resolved.  Fatigue resolved. Erythematous skin around neck resolved. No N/V. No worse than baseline tinnitus. No recent fevers or SOB. No constipation or diarrhea. Again feels more mentally sharp and no fatigue since stopping pain medication. \par \par \par

## 2023-05-02 NOTE — PROCEDURE
[None] : none [Flexible Endoscope] : examined with the flexible endoscope [Normal] : normal [de-identified] : evaristo MOY [de-identified] : amaris scca BOT

## 2023-06-13 ENCOUNTER — APPOINTMENT (OUTPATIENT)
Dept: OTOLARYNGOLOGY | Facility: CLINIC | Age: 58
End: 2023-06-13
Payer: MEDICAID

## 2023-06-13 VITALS
DIASTOLIC BLOOD PRESSURE: 71 MMHG | WEIGHT: 145 LBS | SYSTOLIC BLOOD PRESSURE: 127 MMHG | BODY MASS INDEX: 21.48 KG/M2 | HEIGHT: 69 IN | OXYGEN SATURATION: 95 % | HEART RATE: 74 BPM

## 2023-06-13 PROCEDURE — 99214 OFFICE O/P EST MOD 30 MIN: CPT | Mod: 25

## 2023-06-13 PROCEDURE — 31575 DIAGNOSTIC LARYNGOSCOPY: CPT

## 2023-06-13 NOTE — HISTORY OF PRESENT ILLNESS
[de-identified] : Mr. Moses  is accompanied by his friend, Betsy. 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 Chemotherapy completed 6/15/2022 with Dr. De Guzman, radiation completed on 6/28/2022 with Dr. Kennedy.\par  He is 1 year  out of treatment.  He presents today due to concern of recent right side headaches, neck stiffness, pain in the back of his tongue, especially when he yawns, and left armpit tenderness. Metallic taste in the mouth has returned.   Denies dysphonia and or dyspnea. Food has to be moist when he swallows. Coughing after eating has improved, but he still coughs at time when he swallows.   Weigh stable, gaining some weight. \par He is following up with Mandi, TERRENCE. Has not followed up with Dr. Johnson. \par Ct chest scan appt.?\par

## 2023-06-13 NOTE — PROCEDURE
[None] : none [Flexible Endoscope] : examined with the flexible endoscope [Normal] : normal vallecula [de-identified] : c FARZANEH..  RT change [de-identified] : amaris scca BOT

## 2023-06-13 NOTE — CONSULT LETTER
[Dear  ___] : Dear  [unfilled], [Courtesy Letter:] : I had the pleasure of seeing your patient, [unfilled], in my office today. [Please see my note below.] : Please see my note below. [Sincerely,] : Sincerely, [FreeTextEntry2] : Erick Sultana MD ( [place], [place]) \par  [FreeTextEntry3] : Ambrocio Tena MD, FACS\par \par    [facility] [place]\par Associate Chair\par    Department of Otolaryngology\par \par Professor\par Otolaryngology & Molecular Medicine\par [facility] [employer]\par

## 2023-07-11 ENCOUNTER — APPOINTMENT (OUTPATIENT)
Dept: PHYSICAL MEDICINE AND REHAB | Facility: CLINIC | Age: 58
End: 2023-07-11
Payer: MEDICAID

## 2023-07-11 VITALS
SYSTOLIC BLOOD PRESSURE: 117 MMHG | HEIGHT: 69 IN | DIASTOLIC BLOOD PRESSURE: 63 MMHG | HEART RATE: 78 BPM | BODY MASS INDEX: 22.07 KG/M2 | RESPIRATION RATE: 12 BRPM | WEIGHT: 149 LBS

## 2023-07-11 DIAGNOSIS — Z86.19 PERSONAL HISTORY OF OTHER INFECTIOUS AND PARASITIC DISEASES: ICD-10-CM

## 2023-07-11 PROCEDURE — 99214 OFFICE O/P EST MOD 30 MIN: CPT

## 2023-07-11 RX ORDER — PILOCARPINE HYDROCHLORIDE 5 MG/1
5 TABLET, FILM COATED ORAL
Qty: 90 | Refills: 1 | Status: ACTIVE | COMMUNITY
Start: 2023-07-11 | End: 1900-01-01

## 2023-07-11 NOTE — REVIEW OF SYSTEMS
[Negative] : Heme/Lymph [FreeTextEntry2] :  +Decreased appetite.  [FreeTextEntry9] : +neck stiffness [de-identified] : +numbness of toes; recent headaches

## 2023-07-11 NOTE — PHYSICAL EXAM
[FreeTextEntry1] : Gen: Patient is A&O x 3, NAD\par HEENT: EOMI, hearing grossly normal\par Resp: regular, non - labored\par CV: pulses regular\par Skin: no rashes, erythema \par Lymph: no clubbing, cyanosis, edema, or palpable lymphadenopathy\par Inspection: no instability or misalignment\par ROM: mildly decrease cervical rotation and extension, Early fibrosis of neck. \par Palpation: non- Tender to touch cervical region \par Sensation: decreased to light touch in feet \par Reflexes: 1+ and symmetric throughout\par Strength: 5/5 throughout\par Gait: normal, non-antalgic\par

## 2023-07-11 NOTE — ASSESSMENT
[FreeTextEntry1] : 58 year old male presenting for follow-up\par \par #Cancer of base of tongue:\par -Educated on home exercise program to maintain cervical ROM\par \par #Neuropathic pain/Neuropathy:\par -Continue Gabapentin 600mg TID\par \par #Dysphagia: \par -Continue SLP\par \par #At risk for lymphedema:\par -No clinical signs of lymphedema\par -Lymphedema education provided to patient \par -Continue to monitor closely monitor. \par \par #Xerostomia\par -Patient with chronic xerostomia impacting swallow function and quality of life.  Extensive discussion had with patient regarding risk and benefits of pilocarpine.  He does have a history of asthma.  Discussed contraindications to this.  He reports his asthma has been well controlled and has no acute exacerbations recently.  He reports given this he still does want to try as his xerostomia is significantly impacting his quality of life.\par -Start pilocarpine 5 mg with gradual titration to 3 times daily, discussed if any side effects to stop medication and report immediately to ER.  He is in agreement with plan.\par \par Follow up in 1 month.

## 2023-07-11 NOTE — HISTORY OF PRESENT ILLNESS
[FreeTextEntry1] : Mr. Moses is a 58 year old male with OPSCC (BOT, cT2 N1, left level 2A lymphadenopathy), undergoing definitive chemoradiation with weekly Cisplatin. He completed chemotherapy with cisplatin and radiation therapy in June 2022.\par \par Currently is only on the gabapentin 600mg TID.  Tolerating speech therapy well. \par Reports dry mouth which impacts eating and quality of life. Reports unchanged neck stiffness. Increased temporarily but since returned to new baseline. Denies any swelling under his neck region. Reports numbness of toes.  \par

## 2023-08-16 NOTE — ED PROVIDER NOTE - IV ALTEPLASE DOOR HIDDEN
Dtap vaccine today. Wound cleaned, antibiotic ointment applied and area dressed. Mupirocin ointment to area. Pt states he has RX at home. RTC with worsening, new or persistent symptoms.    show

## 2023-08-22 ENCOUNTER — APPOINTMENT (OUTPATIENT)
Dept: PHYSICAL MEDICINE AND REHAB | Facility: CLINIC | Age: 58
End: 2023-08-22
Payer: MEDICAID

## 2023-08-22 VITALS
RESPIRATION RATE: 14 BRPM | HEIGHT: 69 IN | BODY MASS INDEX: 22.22 KG/M2 | HEART RATE: 71 BPM | SYSTOLIC BLOOD PRESSURE: 119 MMHG | DIASTOLIC BLOOD PRESSURE: 72 MMHG | WEIGHT: 150 LBS

## 2023-08-22 PROCEDURE — 99214 OFFICE O/P EST MOD 30 MIN: CPT

## 2023-08-22 NOTE — HISTORY OF PRESENT ILLNESS
[FreeTextEntry1] : Mr. Moses is a 58 year old male with OPSCC (BOT, cT2 N1, left level 2A lymphadenopathy), undergoing definitive chemoradiation with weekly Cisplatin. He completed chemotherapy with cisplatin and radiation therapy in June 2022.  Still taking gabapentin, helps with his neuropathy.  Occasionally feels shooting pain in his feet.    Started pilocarpine, takes 5mg TID.  Reports improvement in dry mouth.  Denies any side effects.  Denies any shortness of breath or exacerbation of asthma.  Reports dry mouth still is his biggest impairement in quality of life s/p treatment.

## 2023-08-22 NOTE — ASSESSMENT
[FreeTextEntry1] : 58 year old male presenting for follow-up  #Neuropathic pain/Neuropathy: -Continue Gabapentin 600mg TID  #At risk for lymphedema: -No clinical signs of lymphedema -Lymphedema education provided to patient  -Continue to monitor closely monitor.   #Xerostomia -Patient with chronic xerostomia impacting swallow function and quality of life.  Extensive discussion had with patient regarding risk and benefits of pilocarpine.  He does have a history of asthma.  Discussed contraindications to this.  Currently no side effects and improving symptoms. -Gradually titrate up pilocarpine to 10mg TID  Follow up in 1 month.

## 2023-08-22 NOTE — REVIEW OF SYSTEMS
[Negative] : Heme/Lymph [FreeTextEntry4] : +Dry mouth  [FreeTextEntry9] : +neck stiffness [de-identified] : +numbness of toes

## 2023-08-22 NOTE — PHYSICAL EXAM
[FreeTextEntry1] : Gen: Patient is A&O x 3, NAD HEENT: EOMI, hearing grossly normal Resp: regular, non - labored CV: pulses regular Skin: no rashes, erythema  Lymph: no clubbing, cyanosis, edema, or palpable lymphadenopathy Inspection: no instability or misalignment ROM: mildly decrease cervical rotation and extension, Early fibrosis of neck.  Palpation: non- Tender to touch cervical region  Sensation: decreased to light touch in feet  Reflexes: 1+ and symmetric throughout Strength: 5/5 throughout Gait: normal, non-antalgic

## 2023-08-24 ENCOUNTER — RESULT REVIEW (OUTPATIENT)
Age: 58
End: 2023-08-24

## 2023-09-12 ENCOUNTER — OUTPATIENT (OUTPATIENT)
Dept: OUTPATIENT SERVICES | Facility: HOSPITAL | Age: 58
LOS: 1 days | End: 2023-09-12

## 2023-09-12 ENCOUNTER — APPOINTMENT (OUTPATIENT)
Dept: CT IMAGING | Facility: CLINIC | Age: 58
End: 2023-09-12
Payer: MEDICAID

## 2023-09-12 DIAGNOSIS — C01 MALIGNANT NEOPLASM OF BASE OF TONGUE: ICD-10-CM

## 2023-09-12 PROCEDURE — 71260 CT THORAX DX C+: CPT | Mod: 26

## 2023-09-12 PROCEDURE — 70491 CT SOFT TISSUE NECK W/DYE: CPT | Mod: 26

## 2023-09-14 NOTE — ED ADULT NURSE NOTE - NS ED NOTE ABUSE SUSPICION NEGLECT YN
Luz Maria called from [facility], the patient is scheduled for a CT Abd/Pelvis.  They need an order for BUN/Creat in order to do the procedure.     Phone [phone]  Fax [phone]  
Thank you   
No

## 2023-09-19 ENCOUNTER — OUTPATIENT (OUTPATIENT)
Dept: OUTPATIENT SERVICES | Facility: HOSPITAL | Age: 58
LOS: 1 days | End: 2023-09-19
Payer: MEDICAID

## 2023-09-19 DIAGNOSIS — R03.0 ELEVATED BLOOD-PRESSURE READING, WITHOUT DIAGNOSIS OF HYPERTENSION: ICD-10-CM

## 2023-09-19 DIAGNOSIS — R06.02 SHORTNESS OF BREATH: ICD-10-CM

## 2023-09-19 PROCEDURE — 93016 CV STRESS TEST SUPVJ ONLY: CPT

## 2023-09-19 PROCEDURE — 93018 CV STRESS TEST I&R ONLY: CPT

## 2023-09-19 PROCEDURE — 93017 CV STRESS TEST TRACING ONLY: CPT

## 2023-09-22 ENCOUNTER — OUTPATIENT (OUTPATIENT)
Dept: OUTPATIENT SERVICES | Facility: HOSPITAL | Age: 58
LOS: 1 days | Discharge: HOME | End: 2023-09-22

## 2023-09-22 DIAGNOSIS — C02.9 MALIGNANT NEOPLASM OF TONGUE, UNSPECIFIED: ICD-10-CM

## 2023-09-26 ENCOUNTER — NON-APPOINTMENT (OUTPATIENT)
Age: 58
End: 2023-09-26

## 2023-09-26 ENCOUNTER — APPOINTMENT (OUTPATIENT)
Dept: RADIATION ONCOLOGY | Facility: CLINIC | Age: 58
End: 2023-09-26
Payer: MEDICAID

## 2023-09-26 ENCOUNTER — APPOINTMENT (OUTPATIENT)
Dept: HEMATOLOGY ONCOLOGY | Facility: CLINIC | Age: 58
End: 2023-09-26
Payer: MEDICAID

## 2023-09-26 ENCOUNTER — RESULT REVIEW (OUTPATIENT)
Age: 58
End: 2023-09-26

## 2023-09-26 VITALS
TEMPERATURE: 98.6 F | HEART RATE: 79 BPM | BODY MASS INDEX: 23.07 KG/M2 | WEIGHT: 155.76 LBS | OXYGEN SATURATION: 98 % | SYSTOLIC BLOOD PRESSURE: 150 MMHG | HEIGHT: 69 IN | DIASTOLIC BLOOD PRESSURE: 70 MMHG

## 2023-09-26 PROCEDURE — 99215 OFFICE O/P EST HI 40 MIN: CPT

## 2023-09-26 PROCEDURE — 99214 OFFICE O/P EST MOD 30 MIN: CPT

## 2023-10-02 LAB
ALBUMIN SERPL ELPH-MCNC: 4.8 G/DL
ALP BLD-CCNC: 79 U/L
ALT SERPL-CCNC: 17 U/L
ANION GAP SERPL CALC-SCNC: 11 MMOL/L
AST SERPL-CCNC: 19 U/L
BILIRUB SERPL-MCNC: 0.5 MG/DL
BUN SERPL-MCNC: 20 MG/DL
CALCIUM SERPL-MCNC: 10.4 MG/DL
CHLORIDE SERPL-SCNC: 101 MMOL/L
CO2 SERPL-SCNC: 29 MMOL/L
CREAT SERPL-MCNC: 0.69 MG/DL
EGFR: 107 ML/MIN/1.73M2
GLUCOSE SERPL-MCNC: 112 MG/DL
MAGNESIUM SERPL-MCNC: 2 MG/DL
POTASSIUM SERPL-SCNC: 5.5 MMOL/L
PROT SERPL-MCNC: 7.5 G/DL
SODIUM SERPL-SCNC: 141 MMOL/L
T4 FREE SERPL-MCNC: 0.8 NG/DL
TSH SERPL-ACNC: 1.92 UIU/ML

## 2023-10-24 ENCOUNTER — APPOINTMENT (OUTPATIENT)
Dept: PHYSICAL MEDICINE AND REHAB | Facility: CLINIC | Age: 58
End: 2023-10-24
Payer: MEDICAID

## 2023-10-24 VITALS
HEART RATE: 87 BPM | BODY MASS INDEX: 22.36 KG/M2 | WEIGHT: 151 LBS | DIASTOLIC BLOOD PRESSURE: 69 MMHG | SYSTOLIC BLOOD PRESSURE: 143 MMHG | OXYGEN SATURATION: 99 % | HEIGHT: 69 IN

## 2023-10-24 PROCEDURE — 99214 OFFICE O/P EST MOD 30 MIN: CPT

## 2023-12-11 ENCOUNTER — NON-APPOINTMENT (OUTPATIENT)
Age: 58
End: 2023-12-11

## 2023-12-12 ENCOUNTER — APPOINTMENT (OUTPATIENT)
Dept: OTOLARYNGOLOGY | Facility: CLINIC | Age: 58
End: 2023-12-12
Payer: MEDICAID

## 2023-12-12 VITALS
BODY MASS INDEX: 23.25 KG/M2 | HEART RATE: 70 BPM | DIASTOLIC BLOOD PRESSURE: 67 MMHG | HEIGHT: 69 IN | SYSTOLIC BLOOD PRESSURE: 146 MMHG | WEIGHT: 157 LBS

## 2023-12-12 PROCEDURE — 99214 OFFICE O/P EST MOD 30 MIN: CPT | Mod: 25

## 2023-12-12 PROCEDURE — 31575 DIAGNOSTIC LARYNGOSCOPY: CPT

## 2024-01-01 NOTE — ED ADULT TRIAGE NOTE - IDEAL BODY WEIGHT(KG)
Plan for this couplet: Pump if baby not latching well to stimulate milk production (Hospital Grade Pump recommended).    Manual breast pump provided.   Hand Expression encouraged.    Feed baby any colostrum that is expressed, referring to appropriate supplement guidelines.      Mother may choose to continue latching practice in between pumping, hand expressing and/or supplementing.     Kerens video on maximizing milk and hand expression videos recommended.  Breastfeeding plan:     Feed on Three step plan as follows: offer breast every 3 hours or more often on cue, pump every 3 hours, and supplement according to guidelines given after breastfeeding.      Observe signs that infant is getting enough as transitioning  stools to bright yellow/green seedy loose stools by day 5.      Follow up with PEDS PCP as scheduled      Call for breastfeeding for 1:1 lactation consultation through BF [facility] as needed and attend the BF Circles without need for appointment.      71

## 2024-01-17 ENCOUNTER — OUTPATIENT (OUTPATIENT)
Dept: OUTPATIENT SERVICES | Facility: HOSPITAL | Age: 59
LOS: 1 days | Discharge: HOME | End: 2024-01-17

## 2024-01-17 DIAGNOSIS — C02.9 MALIGNANT NEOPLASM OF TONGUE, UNSPECIFIED: ICD-10-CM

## 2024-01-23 ENCOUNTER — RESULT REVIEW (OUTPATIENT)
Age: 59
End: 2024-01-23

## 2024-01-23 ENCOUNTER — LABORATORY RESULT (OUTPATIENT)
Age: 59
End: 2024-01-23

## 2024-01-23 ENCOUNTER — APPOINTMENT (OUTPATIENT)
Dept: HEMATOLOGY ONCOLOGY | Facility: CLINIC | Age: 59
End: 2024-01-23
Payer: MEDICAID

## 2024-01-23 ENCOUNTER — APPOINTMENT (OUTPATIENT)
Dept: PHYSICAL MEDICINE AND REHAB | Facility: CLINIC | Age: 59
End: 2024-01-23
Payer: MEDICAID

## 2024-01-23 VITALS
HEIGHT: 69 IN | HEART RATE: 83 BPM | SYSTOLIC BLOOD PRESSURE: 131 MMHG | WEIGHT: 160 LBS | BODY MASS INDEX: 23.7 KG/M2 | RESPIRATION RATE: 14 BRPM | DIASTOLIC BLOOD PRESSURE: 74 MMHG

## 2024-01-23 VITALS
HEIGHT: 69 IN | SYSTOLIC BLOOD PRESSURE: 136 MMHG | TEMPERATURE: 97.7 F | WEIGHT: 159.72 LBS | HEART RATE: 72 BPM | DIASTOLIC BLOOD PRESSURE: 78 MMHG | BODY MASS INDEX: 23.66 KG/M2 | OXYGEN SATURATION: 98 %

## 2024-01-23 LAB
BASOPHILS # BLD AUTO: 0.1 K/UL — SIGNIFICANT CHANGE UP (ref 0–0.2)
BASOPHILS NFR BLD AUTO: 1.4 % — SIGNIFICANT CHANGE UP (ref 0–2)
EOSINOPHIL # BLD AUTO: 2.5 K/UL — HIGH (ref 0–0.5)
EOSINOPHIL NFR BLD AUTO: 30 % — HIGH (ref 0–6)
HCT VFR BLD CALC: 47.4 % — SIGNIFICANT CHANGE UP (ref 39–50)
HGB BLD-MCNC: 15.5 G/DL — SIGNIFICANT CHANGE UP (ref 13–17)
LYMPHOCYTES # BLD AUTO: 0.8 K/UL — LOW (ref 1–3.3)
LYMPHOCYTES # BLD AUTO: 9.3 % — LOW (ref 13–44)
MCHC RBC-ENTMCNC: 32.8 G/DL — SIGNIFICANT CHANGE UP (ref 32–36)
MCHC RBC-ENTMCNC: 33.4 PG — SIGNIFICANT CHANGE UP (ref 27–34)
MCV RBC AUTO: 101.8 FL — HIGH (ref 80–100)
MONOCYTES # BLD AUTO: 0.4 K/UL — SIGNIFICANT CHANGE UP (ref 0–0.9)
MONOCYTES NFR BLD AUTO: 4.2 % — SIGNIFICANT CHANGE UP (ref 2–14)
NEUTROPHILS # BLD AUTO: 4.6 K/UL — SIGNIFICANT CHANGE UP (ref 1.8–7.4)
NEUTROPHILS NFR BLD AUTO: 55.1 % — SIGNIFICANT CHANGE UP (ref 43–77)
PLATELET # BLD AUTO: 184 K/UL — SIGNIFICANT CHANGE UP (ref 150–400)
RBC # BLD: 4.66 M/UL — SIGNIFICANT CHANGE UP (ref 4.2–5.8)
RBC # FLD: 11.9 % — SIGNIFICANT CHANGE UP (ref 10.3–14.5)
WBC # BLD: 8.4 K/UL — SIGNIFICANT CHANGE UP (ref 3.8–10.5)
WBC # FLD AUTO: 8.4 K/UL — SIGNIFICANT CHANGE UP (ref 3.8–10.5)

## 2024-01-23 PROCEDURE — 99214 OFFICE O/P EST MOD 30 MIN: CPT

## 2024-01-23 NOTE — HISTORY OF PRESENT ILLNESS
[FreeTextEntry1] : Mr. Moses is a 58 year old male with OPSCC (BOT, cT2 N1, left level 2A lymphadenopathy), undergoing definitive chemoradiation with weekly Cisplatin. He completed chemotherapy with cisplatin and radiation therapy in June 2022.  He reports he still is having dry mouth.  He has not taking pilocarpine.  Denies any asthma exacerbations.  Still feels neuropathy in his toes.  Taking gabapentin denies any side effects to this.  Feel like he is having more joint pain in his toes.

## 2024-01-23 NOTE — ASSESSMENT
[FreeTextEntry1] : 58 year old male presenting for follow-up  #Neuropathic pain/Neuropathy: -Continue Gabapentin 900mg TID-rx sent   #At risk for lymphedema: -No clinical signs of lymphedema -Lymphedema education provided to patient -Continue to monitor closely monitor  #Xerostomia -Pilocarpine has improved symptoms, however concern with taking given asthma -Instructed to not take -Refer to pulmonology for evaluation    Follow up in 3 months.

## 2024-01-23 NOTE — REVIEW OF SYSTEMS
[Negative] : Heme/Lymph [FreeTextEntry4] : +Dry mouth  [FreeTextEntry9] : +neck stiffness [de-identified] : +numbness of toes

## 2024-01-24 LAB
ALBUMIN SERPL ELPH-MCNC: 4.7 G/DL
ALP BLD-CCNC: 63 U/L
ALT SERPL-CCNC: 17 U/L
ANION GAP SERPL CALC-SCNC: 15 MMOL/L
AST SERPL-CCNC: 18 U/L
BILIRUB SERPL-MCNC: 0.5 MG/DL
BUN SERPL-MCNC: 18 MG/DL
CALCIUM SERPL-MCNC: 9.9 MG/DL
CHLORIDE SERPL-SCNC: 100 MMOL/L
CO2 SERPL-SCNC: 26 MMOL/L
CREAT SERPL-MCNC: 0.72 MG/DL
EGFR: 105 ML/MIN/1.73M2
GLUCOSE SERPL-MCNC: 94 MG/DL
MAGNESIUM SERPL-MCNC: 2 MG/DL
POTASSIUM SERPL-SCNC: 4.9 MMOL/L
PROT SERPL-MCNC: 7.2 G/DL
SODIUM SERPL-SCNC: 140 MMOL/L

## 2024-01-30 ENCOUNTER — APPOINTMENT (OUTPATIENT)
Dept: RADIATION ONCOLOGY | Facility: CLINIC | Age: 59
End: 2024-01-30
Payer: MEDICAID

## 2024-01-30 ENCOUNTER — NON-APPOINTMENT (OUTPATIENT)
Age: 59
End: 2024-01-30

## 2024-01-30 VITALS
WEIGHT: 159 LBS | HEIGHT: 69 IN | OXYGEN SATURATION: 99 % | HEART RATE: 83 BPM | SYSTOLIC BLOOD PRESSURE: 134 MMHG | TEMPERATURE: 97 F | BODY MASS INDEX: 23.55 KG/M2 | DIASTOLIC BLOOD PRESSURE: 83 MMHG | RESPIRATION RATE: 15 BRPM

## 2024-01-30 PROCEDURE — 99214 OFFICE O/P EST MOD 30 MIN: CPT

## 2024-01-30 NOTE — PHYSICAL EXAM
[Extraocular Movements] : extraocular movements were intact [Outer Ear] : the ears and nose were normal in appearance [Examination Of The Oral Cavity] : the lips and gums were normal [Normal Oral Cavity] : oral cavity was normal [Oropharynx] : The oropharynx was normal [Nondistended] : nondistended [Normal] : no palpable adenopathy [Cervical Lymph Nodes Enlarged Posterior Bilaterally] : posterior cervical [Cervical Lymph Nodes Enlarged Anterior Bilaterally] : anterior cervical [Supraclavicular Lymph Nodes Enlarged Bilaterally] : supraclavicular [Axillary Lymph Nodes Enlarged Bilaterally] : axillary

## 2024-01-30 NOTE — REVIEW OF SYSTEMS
[Dysphagia] : dysphagia [Negative] : Allergic/Immunologic [Dysphagia: Grade 1 - Symptomatic, able to eat regular diet] : Dysphagia: Grade 1 - Symptomatic, able to eat regular diet [Edema Limbs: Grade 0] : Edema Limbs: Grade 0  [Fatigue: Grade 0] : Fatigue: Grade 0 [Localized Edema: Grade 0] : Localized Edema: Grade 0  [Neck Edema: Grade 0] : Neck Edema: Grade 0 [Mucositis Oral: Grade 0] : Mucositis Oral: Grade 0  [Oral Pain: Grade 0] : Oral Pain: Grade 0 [Dysgeusia: Grade 1- Altered taste but no change in diet] : Dysgeusia: Grade 1 - Altered taste but no change in diet [Skin Hyperpigmentation: Grade 0] : Skin Hyperpigmentation: Grade 0

## 2024-01-30 NOTE — HISTORY OF PRESENT ILLNESS
[FreeTextEntry1] : 58 year old gentleman with squamous cell carcinoma of the base of tongue, s/p chemoradiation completed 6/21/22.  9/12/23 CT neck showed no evidence of recurrent or metastatic disease.  No significant change from the prior exam. 9/12/23 CT chest showed no pulmonary nodules, and previously noted opacities in both lower lobes are no longer present and have resolved. 12/12/23 laryngoscopy cNED.  Interval history: Reports that he is feeling well overall.  He no longer thinks about his cancer diagnosis as often, and does not perform PT exercises. He still has some xerostomia and decreased intensity of taste, resulting in less pleasurable dining experience.  He often prefers to supplement or substitute with Ensure/boost. No odynophagia or neck pain. Submental lymphedema has improved. He follows with dentistry every 3 months, and brushes with high-fluoride toothpaste at least once daily. Slight interval weight gain, with pants fitting more snugly.  Care Team PMR  Francisco Abdul Onc Dr. Saima De Guzman --> Dr. Avani Gaytan  CLAUDELANDY Tena   ENT Erick Tam

## 2024-02-13 NOTE — ASSESSMENT
[FreeTextEntry1] : 58 year old man with no significant past medical history who presents to the oncology clinic with locally advanced oropharyngeal cancer.  # oropharyngeal cancer - pre treatment staging PET-CT on 4/8/22: FDG avid LEFT oropharyngeal/base of tongue mass, compatible with the given diagnosis of carcinoma. FDG avid metastatic LEFT level IIa cervical nodes. Mildly FDG avid subcentimeter RIGHT level IIa cervical node, nonspecific. FDG avid first molar RIGHT mandibular focus, likely dental in etiology. - completed RT on 6/28/22, received C7 of Cisplatin on 6/14/22 - FARZANEH on post treatment PET on 9/30/22 - PEG removed in 3/2023, has gained 4lbs since removal - Completed Speech & Swallow exercises  - Dr. Kennedy and Dr. Tena follow ups as per their protocols - further imaging as per Dr. Tena - Labs from Sept 2023 shows mildly low  Free T4 levels at 0.8. Will repeat today, if continue to be low, will consider thyroid hormone supplementation. - Repeat CT scan of the neck soft tissue and chest in 6 months (March 2024) - F/U in 3 months

## 2024-02-13 NOTE — RESULTS/DATA
[FreeTextEntry1] : 10/4/22: PET-CT  IMPRESSION: Compared to FDG-PET/CT scan dated 4/8/2022:  1. Interval resolution of FDG avidity in the left base of tongue with either resolution or significant decrease in size of associated lesion.  2. Interval decreased size and avidity of FDG avid left level IIA cervical lymph node. Unchanged nonspecific minimally FDG avid subcentimeter right level IIA lymph node.  3. FDG avidity in the mid and lateral portion of the base of the mouth, likely representing muscle activity. Please correlate clinically.  4. Mild linear peripheral FDG avidity in bilateral base of mouth, probably activity in pooled saliva. Please correlate clinically or with direct visualization.  5. Interval significant decreased avidity of focus in the edentulous area of right mandible.  6. Unchanged enlarged prostate. Previously seen focus in the midportion is no longer demonstrated likely secondary to urinary activity in the prostatic urethra.    3/24/22 Pathology:  Tongue, left base of tongue tumor, excision:   Squamous cell carcinoma, non-keratinizing.   p16 and HPV stains are pending and an addendum will be issued   March 6th, 2023:   CT scan of the neck soft tissue done at [facility] shows:  1. No pulmonary nodules are noted. 2. Previously noted opacities in both lower lobes are no longer present and have resolved.  CT scan of the chest done at [facility] shows:  1. No convincing evidence of thoracic metastasis. 2. Patchy bilateral lower lobe opacities are new compared to 9/30/2022 and compatible with aspiration or infection. 3. Partially imaged left renal 1.5 cm lesion; indeterminate and recommend dedicated CT or MR abdomen to further characterize.

## 2024-02-13 NOTE — HISTORY OF PRESENT ILLNESS
[Date: ____________] : Patient's last distress assessment performed on [unfilled]. [0 - No Distress] : Distress Level: 0 [de-identified] : 59 yo M with h/o chronic sinusitis who was diagnosed with SCC of the left BOT in March 2022.  He saw ENT, Dr. Erick Sultana, c/o headaches, facial pain and left ear pain.  CT maxillofacial sinus on 2/22/22 showed highly suspicious soft tissue fullness oropharynx left glossotonsilar sulcus, base of tongue/vallecula measuring 2.5 x 2.3 concerning for neoplasm. Suspicious left level 2A lymph node measure 1.6 cm.  Follow up CT neck on 3/4/22 showed soft tissue mass left oropharynx 28.5 x 23.9 x 34.3 mm with involvement of the intrinsic tongue muscles (T4).  2 unilateral pathologic left 2A lymph nodes with cystic necrosis concerning for tory metastasis (N1).  He next saw ENT surgery, Dr. Renny Tena, where he was scheduled for EUA on 3/24/22 which stated "by palpation, the patient was found to have a broadly based tumor epicentered in his tongue base.  Anteriorly, it extended to the proximal tongue base circumvallate papilla region.  It extended laterally to, but not  across the glossotonsillar sulcus anteriorly and more posteriorly, laterally it extended to, but not across the glossopharyngeal sulcus.  The tumor seemed to extend just to the midline both in its anterior and posterior extent.  The tumor seemed to approach, but not cross into the vallecula."  Biopsy c/w non-keratinizing SCC.  Staging PET pending for 4/8/22, which did not show evidence of metastatic disease.   The patient was started on defintiive cRT with weekly cisplatin.  [de-identified] : April 24th, 2023: 59 y/o M Patient presents on s/p C7 weekly Cisplatin with concurrent radiation therapy for SCC of the left BOT. End of RT delayed as patient was admitted 6/20/22 to [facility] with aspiration pneumonia, hyponatremia & pancytopenia, discharged on 6/24/22 and resumed RT on 6/27/22. Completed RT on 6/28/22.  PEG removed in 3/2023. The pt was seen by Dr. Jake Prakash Dysgeusia slowly improving. + Xerostomia improving. + Dysphagia with episodes of aspiration. Weight stable and PEG removed. No odynophagia. + Mild lymphedema. Thick oral secretions resolved.  Fatigue resolved. Erythematous skin around neck resolved. No N/V. No worse than baseline tinnitus. No recent fevers or SOB. No constipation or diarrhea. Again feels more mentally sharp and no fatigue since stopping pain medication.   June 13th, 2023 The pt was seen by Dr. Darinel Albrecht for Laryngoscopy. Laryngoscopy: c FARZANEH.. RT change.   Laryngoscopy Indication: hx scca BOT. Anesthesia: none. Technique: examined with the flexible endoscope.   Posterior Nasopharynx Findings: normal.   Hypopharynx Findings: normal base of the tongue and normal vallecula.  Sep 14th, 2023:  CT scan of the Neck soft tissue done at [facility] imaging shows:  1. No evidence of recurrent or metastatic disease. 2. No significant change from the prior exam.  CT scan of the chest at [facility] imaging shows:  1.  No pulmonary nodules are noted. 2. Previously noted opacities in both lower lobes are no longer present and have resolved.  9/26/23: Patient here for inital visit Transferring care from DR De Guzman  The pt is doing well and he is eating solid food. The pt was seen by Dr. Tena and had a Laryngoscopy with Dr. Darinel Albrecht which was normal. The pt showed post chemo-therapy changes. The pt has finished PT for swallowing. The pt notes some neck stiffness. He is overall doing well. He has been back to work about a year ago.   1/23/24: Patient presents for follow up  Patient doing well Patient swallowing solid food with no pain  Admits mild dysgeusia  12/12/23 Laryngoscopy FARZANEH

## 2024-02-13 NOTE — REVIEW OF SYSTEMS
[Fatigue] : fatigue [Recent Change In Weight] : ~T recent weight change [Negative] : Heme/Lymph [Fever] : no fever [Chills] : no chills [Night Sweats] : no night sweats [Odynophagia] : no odynophagia

## 2024-02-13 NOTE — PHYSICAL EXAM
[Fully active, able to carry on all pre-disease performance without restriction] : Status 0 - Fully active, able to carry on all pre-disease performance without restriction [Thin] : thin [Normal] : affect appropriate [de-identified] : left cervical lymph node is not palpable.

## 2024-03-14 ENCOUNTER — APPOINTMENT (OUTPATIENT)
Dept: PHYSICAL MEDICINE AND REHAB | Facility: CLINIC | Age: 59
End: 2024-03-14
Payer: MEDICAID

## 2024-03-14 VITALS
HEIGHT: 69 IN | HEART RATE: 94 BPM | RESPIRATION RATE: 14 BRPM | SYSTOLIC BLOOD PRESSURE: 150 MMHG | DIASTOLIC BLOOD PRESSURE: 77 MMHG | BODY MASS INDEX: 23.7 KG/M2 | WEIGHT: 160 LBS

## 2024-03-14 PROCEDURE — 99214 OFFICE O/P EST MOD 30 MIN: CPT

## 2024-03-14 NOTE — HISTORY OF PRESENT ILLNESS
[FreeTextEntry1] : Mr. Moses is a 59 year old male with OPSCC (BOT, cT2 N1, left level 2A lymphadenopathy), undergoing definitive chemoradiation with weekly Cisplatin. He completed chemotherapy with cisplatin and radiation therapy in June 2022.  He reports he was lifting something heavy and felt some pain in his groin.  States he had this previously and it did get better.  Denies any bulging.  States pains comes and goes.  Worse in left groin region.  Still has some neuropathy symptoms.  Still has some spasms in her neck.  Taking gabapentin which her house helping.  Reports some dry mouth.  No new weakness or bowel bladder dysfunction.

## 2024-03-14 NOTE — ASSESSMENT
[FreeTextEntry1] : 58 year old male presenting for follow-up  #Left groin pain: -Clinically on exam I do not suspect his symptoms are from intrinsic hip pathology.  He may have a muscle strain. He was concerned about a hernia, on clinical evaluation I do not discern any appreciable hernias.  Advised if no improvement to follow-up with his primary care physician, consider CT abdomen pelvis to further evaluate.  He is in agreement with this plan.  #Neuropathic pain/Neuropathy: -Continue Gabapentin 900mg TID-rx sent   #At risk for lymphedema: -No clinical signs of lymphedema -Lymphedema education provided to patient -Continue to monitor closely monitor  #Xerostomia -Pilocarpine has improved symptoms, however concern with taking given asthma -Follow up with pulmonology for evaluation  -Discussed if cleared by pulmonology can resume pilocarpine    Follow up in 1 month.

## 2024-03-14 NOTE — REVIEW OF SYSTEMS
[Negative] : Psychiatric [FreeTextEntry4] : +Dry mouth  [FreeTextEntry9] : +neck stiffness [de-identified] : +numbness of toes

## 2024-03-14 NOTE — PHYSICAL EXAM
[FreeTextEntry1] : Gen: Patient is A&O x 3, NAD HEENT: EOMI, hearing grossly normal Resp: regular, non - labored CV: pulses regular Skin: no rashes, erythema  Lymph: no clubbing, cyanosis, edema, or palpable lymphadenopathy Inspection: no instability or misalignment, no inguinal or testicular hernia  ROM: mildly decrease cervical rotation and extension, Early fibrosis of neck, no pain with left hip ROM Palpation: non- Tender to touch cervical region  Sensation: decreased to light touch in feet  Reflexes: 1+ and symmetric throughout Strength: 5/5 throughout Special Test: -Stinchfield, -HEVER, -FADIR  Gait: normal, non-antalgic

## 2024-03-18 ENCOUNTER — OUTPATIENT (OUTPATIENT)
Dept: OUTPATIENT SERVICES | Facility: HOSPITAL | Age: 59
LOS: 1 days | End: 2024-03-18

## 2024-03-18 ENCOUNTER — APPOINTMENT (OUTPATIENT)
Dept: CT IMAGING | Facility: CLINIC | Age: 59
End: 2024-03-18
Payer: MEDICAID

## 2024-03-18 DIAGNOSIS — C01 MALIGNANT NEOPLASM OF BASE OF TONGUE: ICD-10-CM

## 2024-03-18 PROCEDURE — 70491 CT SOFT TISSUE NECK W/DYE: CPT | Mod: 26

## 2024-04-01 ENCOUNTER — APPOINTMENT (OUTPATIENT)
Dept: PULMONOLOGY | Facility: CLINIC | Age: 59
End: 2024-04-01
Payer: MEDICAID

## 2024-04-01 ENCOUNTER — LABORATORY RESULT (OUTPATIENT)
Age: 59
End: 2024-04-01

## 2024-04-01 VITALS
HEART RATE: 71 BPM | BODY MASS INDEX: 23.55 KG/M2 | HEIGHT: 69 IN | RESPIRATION RATE: 16 BRPM | SYSTOLIC BLOOD PRESSURE: 110 MMHG | OXYGEN SATURATION: 97 % | DIASTOLIC BLOOD PRESSURE: 68 MMHG | WEIGHT: 159 LBS

## 2024-04-01 DIAGNOSIS — J44.89 OTHER SPECIFIED CHRONIC OBSTRUCTIVE PULMONARY DISEASE: ICD-10-CM

## 2024-04-01 DIAGNOSIS — J30.89 OTHER ALLERGIC RHINITIS: ICD-10-CM

## 2024-04-01 DIAGNOSIS — J45.909 UNSPECIFIED ASTHMA, UNCOMPLICATED: ICD-10-CM

## 2024-04-01 LAB — CRP SERPL-MCNC: <3 MG/L

## 2024-04-01 PROCEDURE — 94060 EVALUATION OF WHEEZING: CPT

## 2024-04-01 PROCEDURE — 99205 OFFICE O/P NEW HI 60 MIN: CPT | Mod: 25

## 2024-04-01 RX ORDER — PILOCARPINE HYDROCHLORIDE 5 MG/1
5 TABLET, FILM COATED ORAL
Qty: 180 | Refills: 1 | Status: DISCONTINUED | COMMUNITY
Start: 2023-08-22 | End: 2024-04-01

## 2024-04-01 RX ORDER — BUDESONIDE AND FORMOTEROL FUMARATE 160; 4.5 UG/1; UG/1
160-4.5 AEROSOL, METERED RESPIRATORY (INHALATION)
Qty: 3 | Refills: 3 | Status: ACTIVE | COMMUNITY
Start: 2024-04-01 | End: 1900-01-01

## 2024-04-01 RX ORDER — FLUTICASONE PROPIONATE AND SALMETEROL XINAFOATE 230; 21 UG/1; UG/1
AEROSOL, METERED RESPIRATORY (INHALATION)
Refills: 0 | Status: DISCONTINUED | COMMUNITY
End: 2024-04-01

## 2024-04-01 RX ORDER — BUDESONIDE AND FORMOTEROL FUMARATE DIHYDRATE 160; 4.5 UG/1; UG/1
AEROSOL RESPIRATORY (INHALATION)
Refills: 0 | Status: ACTIVE | COMMUNITY

## 2024-04-01 RX ORDER — PREDNISONE 5 MG/1
5 TABLET ORAL
Qty: 42 | Refills: 2 | Status: ACTIVE | COMMUNITY
Start: 2024-04-01 | End: 1900-01-01

## 2024-04-01 NOTE — CONSULT LETTER
[Dear  ___] : Dear  [unfilled], [FreeTextEntry1] : I had the pleasure of evaluating your patient, MARILYN VINCENT , in the office today.  Please review my consultation and evaluation report that follows below.  Please do not hesitate to call me if further information is necessary or if you wish to discuss ongoing care or diagnostic work-up.    I very much appreciate your referral and it is a privilege to be able to provide care for your patient.  Sincerely,   Ernesto Lozano MD, MHCM, FACP, SITA-C Pulmonary Medicine  of Medicine Delroy maryann Dyson [facility] of [facility] at \[facility] [place] [facility]\""/[facility] [email or web address] -[facility]ulmonary in [address] [place]  [address]    Fax   Multi-Specialties at [address]  [phone]

## 2024-04-01 NOTE — ASSESSMENT
[FreeTextEntry1] : 60 yo man presents to assess asthma and to evaluate whether he could restart pilocarpine for xerostomia  The patient is s/p treatment for cancer of base of tongue, nonkeratinizing SCC treated with radiation and then chemotherapy with cisplatin Had one episode of aspiration pneumonia in June 2022 in middle of treatment, then completed it Staging: SCCa BOT, p16/HPV16 positive. TNM Stage: c T 4 N 1 M 0  AJCC Stage (8th Ed): III.  Currently being followed by Dr Kennedy and Dr Gaytan  His of environmental allergies for a long time Treated for asthma for >15 years. 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 with Advair for years, only takes it once a day tho and then sometimes not at all Taking singulair daily for some years The fall appears to be his worst time, nevertheless, he denies ever having to use his albuterol rescue Most recently, switched to symbicort but again, only taking it 2 puffs ONCE a day  In the fall , he says he started Pilocarpine for xerostomia--this appeared to be very effective However, over the three weeks he was on it, he WASNT taking Advair, and noticed occasional wheeze---then he started the advair  once a day and the wheezing appeared to improve He then went to Dr Liang, who suggested to stop pilocarpine and vist a pulmononlogist In recent months, no wheezing at all and taking symbicort 2 puffs once a day  Denies history of HT or diabetes or other issues  ROS: The patient had exercise dyspnea when he went hiking---went to cardiology and stress ECHO negative  Note CT chest in Sept 2023 showed clear lungs  Imp 60 yo man s/p treatment for cancer base of tongue s/p RT and cisplatinum Comes asking whether Pilocarpine for xerostomia is safe  Long history of asthm, and ??environemtnal allergies, eosinophilia in past Never taking adequate dosage of LABA/ICS and taking montelukast for some time Surprisingly poor spirometry consistent with asthma/COPD overlap His previous CORREIA is likely due to airways disease  Recommend Symbicort 160, continue montelukast, short steroids Repeat sena in 6-8 weeks Obtain allergy screen  Altho he appeared to tolerate pilocarpine for the most part, and has no history of severe exacerbations,  would NOT resume unless his airway function signficantly improves He does not have much of a cushion with airway funtion

## 2024-04-01 NOTE — HISTORY OF PRESENT ILLNESS
[TextBox_4] : 60 yo man presents to assess asthma and to evaluate whether he could restart pilocarpine for xerostomia  The patient is s/p treatment for cancer of base of tongue, nonkeratinizing SCC treated with radiation and then chemotherapy with cisplatin Had one episode of aspiration pneumonia in June 2022 in middle of treatment, then completed it Staging: SCCa BOT, p16/HPV16 positive. TNM Stage: c T 4 N 1 M 0  AJCC Stage (8th Ed): III.  Currently being followed by Dr Kennedy and Dr Gaytan  His of environmental allergies for a long time Treated for asthma for >15 years. 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 with Advair for years, only takes it once a day tho and then sometimes not at all Taking singulair daily for some years The fall appears to be his worst time, nevertheless, he denies ever having to use his albuterol rescue Most recently, switched to symbicort but again, only taking it 2 puffs ONCE a day  In the fall , he says he started Pilocarpine for xerostomia--this appeared to be very effective However, over the three weeks he was on it, he WASNT taking Advair, and noticed occasional wheeze---then he started the advair  once a day and the wheezing appeared to improve He then went to Dr Liang, who suggested to stop pilocarpine and vist a pulmononlogist In recent months, no wheezing at all and taking symbicort 2 puffs once a day  Denies history of HT or diabetes or other issues  ROS: The patient had exercise dyspnea when he went hiking---went to cardiology and stress ECHO negative

## 2024-04-01 NOTE — PROCEDURE
[FreeTextEntry1] : Pedro Luis: Signficant obstructive disease noted FEV1 1.5 43% FEV1%  54% FVC 2.77 60% pred Midexpir flow 14% NO response to inhaled BD

## 2024-04-03 LAB
A ALTERNATA IGE QN: 5.38 KUA/L
A FUMIGATUS IGE QN: 0.3 KUA/L
BASOPHILS # BLD AUTO: 0.07 K/UL
BASOPHILS NFR BLD AUTO: 1 %
C ALBICANS IGE QN: 2.67 KUA/L
C HERBARUM IGE QN: 0.32 KUA/L
CAT DANDER IGE QN: 1.25 KUA/L
COMMON RAGWEED IGE QN: 1.64 KUA/L
D FARINAE IGE QN: 1.08 KUA/L
D PTERONYSS IGE QN: 0.64 KUA/L
DEPRECATED A ALTERNATA IGE RAST QL: 3 (ref 0–?)
DEPRECATED A FUMIGATUS IGE RAST QL: NORMAL (ref 0–?)
DEPRECATED C ALBICANS IGE RAST QL: 2
DEPRECATED C HERBARUM IGE RAST QL: NORMAL (ref 0–?)
DEPRECATED CAT DANDER IGE RAST QL: 2 (ref 0–?)
DEPRECATED COMMON RAGWEED IGE RAST QL: 2 (ref 0–?)
DEPRECATED D FARINAE IGE RAST QL: 2 (ref 0–?)
DEPRECATED D PTERONYSS IGE RAST QL: 1 (ref 0–?)
DEPRECATED DOG DANDER IGE RAST QL: 1 (ref 0–?)
DEPRECATED M RACEMOSUS IGE RAST QL: 1
DEPRECATED ROACH IGE RAST QL: 2 (ref 0–?)
DEPRECATED TIMOTHY IGE RAST QL: 2 (ref 0–?)
DEPRECATED WHITE OAK IGE RAST QL: 3 (ref 0–?)
DOG DANDER IGE QN: 0.35 KUA/L
EOSINOPHIL # BLD AUTO: 2.36 K/UL
EOSINOPHIL NFR BLD AUTO: 32.6 %
ERYTHROCYTE [SEDIMENTATION RATE] IN BLOOD BY WESTERGREN METHOD: 15 MM/HR
HCT VFR BLD CALC: 46.8 %
HGB BLD-MCNC: 15.3 G/DL
IMM GRANULOCYTES NFR BLD AUTO: 0.1 %
LYMPHOCYTES # BLD AUTO: 0.72 K/UL
LYMPHOCYTES NFR BLD AUTO: 9.9 %
M RACEMOSUS IGE QN: 0.5 KUA/L
MAN DIFF?: NORMAL
MCHC RBC-ENTMCNC: 32.4 PG
MCHC RBC-ENTMCNC: 32.7 GM/DL
MCV RBC AUTO: 99.2 FL
MONOCYTES # BLD AUTO: 0.42 K/UL
MONOCYTES NFR BLD AUTO: 5.8 %
NEUTROPHILS # BLD AUTO: 3.66 K/UL
NEUTROPHILS NFR BLD AUTO: 50.6 %
PLATELET # BLD AUTO: 194 K/UL
RBC # BLD: 4.72 M/UL
RBC # FLD: 13.2 %
ROACH IGE QN: 1.42 KUA/L
TIMOTHY IGE QN: 1.23 KUA/L
TOTAL IGE SMQN RAST: 678 KU/L
WBC # FLD AUTO: 7.24 K/UL
WHITE OAK IGE QN: 8.3 KUA/L

## 2024-04-09 RX ORDER — GABAPENTIN 300 MG/1
300 CAPSULE ORAL 3 TIMES DAILY
Qty: 270 | Refills: 3 | Status: ACTIVE | COMMUNITY
Start: 2023-11-07 | End: 1900-01-01

## 2024-04-15 NOTE — HISTORY OF PRESENT ILLNESS
Problem: Diabetes  Goal: Achieves glycemic balance  Description: Goal is to maintain blood sugar within range with no episodes of hypoglycemia  Outcome: Monitoring/Evaluating progress     Problem: Pain  Goal: Acceptable pain level achieved/maintained at rest using appropriate pain scale for the patient  Outcome: Monitoring/Evaluating progress     Problem: Skin Integrity Alteration  Goal: Skin remains intact with no new/deterioration of wound or pressure injury  Outcome: Monitoring/Evaluating progress     Problem: VTE (Actual)  Goal: Patient maintains mobility and remains free from complications of VTE  Outcome: Monitoring/Evaluating progress     Problem: At Risk for Falls  Goal: Patient does not fall  Outcome: Monitoring/Evaluating progress      [Date: ____________] : Patient's last distress assessment performed on [unfilled]. [0 - No Distress] : Distress Level: 0 [de-identified] : 56 yo M with h/o chronic sinusitis who was diagnosed with SCC of the left BOT in March 2022.\par \par He saw ENT, Dr. Erick Sultana, c/o headaches, facial pain and left ear pain.  CT maxillofacial sinus on 2/22/22 showed highly suspicious soft tissue fullness oropharynx left glossotonsilar sulcus, base of tongue/vallecula measuring 2.5 x 2.3 concerning for neoplasm. Suspicious left level 2A lymph node measure 1.6 cm.  Follow up CT neck on 3/4/22 showed soft tissue mass left oropharynx 28.5 x 23.9 x 34.3 mm with involvement of the intrinsic tongue muscles (T4).  2 unilateral pathologic left 2A lymph nodes with cystic necrosis concerning for tory metastasis (N1).\par \par He next saw ENT surgery, Dr. Renny Tena, where he was scheduled for EUA on 3/24/22 which stated "by palpation, the patient was found to have a broadly based tumor epicentered in his tongue base.  Anteriorly, it extended to the proximal tongue base circumvallate papilla region.  It extended laterally to, but not  across the glossotonsillar sulcus anteriorly and more posteriorly, laterally it extended to, but not across the glossopharyngeal sulcus.  The tumor seemed to extend just to the midline both in its anterior and posterior extent.  The tumor seemed to approach, but not cross into the vallecula."  Biopsy c/w non-keratinizing SCC.\par \par Staging PET pending for 4/8/22.  [de-identified] : Patient presents on C2D2 CRT with weekly Cisplatin for SCC of the left BOT\par + Left facial pain and left ear pain improved since starting treatment and no new odynophagia or dysphagia. + Tinnitus, no worse than baseline. + Dysgeusia, mild so far. + Mild xerostomia. + Excessive oral secretions.  Denies fatigue, N/V/D, decreased appetite, mucositis, erythematous skin, fever cough or SOB.

## 2024-04-16 ENCOUNTER — OUTPATIENT (OUTPATIENT)
Dept: OUTPATIENT SERVICES | Facility: HOSPITAL | Age: 59
LOS: 1 days | Discharge: HOME | End: 2024-04-16

## 2024-04-16 DIAGNOSIS — C02.9 MALIGNANT NEOPLASM OF TONGUE, UNSPECIFIED: ICD-10-CM

## 2024-04-23 ENCOUNTER — APPOINTMENT (OUTPATIENT)
Dept: HEMATOLOGY ONCOLOGY | Facility: CLINIC | Age: 59
End: 2024-04-23
Payer: MEDICAID

## 2024-04-23 ENCOUNTER — APPOINTMENT (OUTPATIENT)
Dept: OTOLARYNGOLOGY | Facility: CLINIC | Age: 59
End: 2024-04-23

## 2024-04-23 ENCOUNTER — RESULT REVIEW (OUTPATIENT)
Age: 59
End: 2024-04-23

## 2024-04-23 VITALS
WEIGHT: 160.06 LBS | OXYGEN SATURATION: 96 % | DIASTOLIC BLOOD PRESSURE: 71 MMHG | HEART RATE: 69 BPM | HEIGHT: 69 IN | SYSTOLIC BLOOD PRESSURE: 136 MMHG | BODY MASS INDEX: 23.71 KG/M2

## 2024-04-23 DIAGNOSIS — G89.3 NEOPLASM RELATED PAIN (ACUTE) (CHRONIC): ICD-10-CM

## 2024-04-23 DIAGNOSIS — C77.0 SECONDARY AND UNSPECIFIED MALIGNANT NEOPLASM OF LYMPH NODES OF HEAD, FACE AND NECK: ICD-10-CM

## 2024-04-23 LAB
BASOPHILS # BLD AUTO: 0.1 K/UL — SIGNIFICANT CHANGE UP (ref 0–0.2)
BASOPHILS NFR BLD AUTO: 1.7 % — SIGNIFICANT CHANGE UP (ref 0–2)
EOSINOPHIL # BLD AUTO: 0.9 K/UL — HIGH (ref 0–0.5)
EOSINOPHIL NFR BLD AUTO: 16.4 % — HIGH (ref 0–6)
HCT VFR BLD CALC: 42.8 % — SIGNIFICANT CHANGE UP (ref 39–50)
HGB BLD-MCNC: 14.6 G/DL — SIGNIFICANT CHANGE UP (ref 13–17)
LYMPHOCYTES # BLD AUTO: 0.5 K/UL — LOW (ref 1–3.3)
LYMPHOCYTES # BLD AUTO: 9.1 % — LOW (ref 13–44)
MCHC RBC-ENTMCNC: 33.5 PG — SIGNIFICANT CHANGE UP (ref 27–34)
MCHC RBC-ENTMCNC: 34.1 G/DL — SIGNIFICANT CHANGE UP (ref 32–36)
MCV RBC AUTO: 98.4 FL — SIGNIFICANT CHANGE UP (ref 80–100)
MONOCYTES # BLD AUTO: 0.3 K/UL — SIGNIFICANT CHANGE UP (ref 0–0.9)
MONOCYTES NFR BLD AUTO: 5.8 % — SIGNIFICANT CHANGE UP (ref 2–14)
NEUTROPHILS # BLD AUTO: 3.8 K/UL — SIGNIFICANT CHANGE UP (ref 1.8–7.4)
NEUTROPHILS NFR BLD AUTO: 67.1 % — SIGNIFICANT CHANGE UP (ref 43–77)
PLATELET # BLD AUTO: 222 K/UL — SIGNIFICANT CHANGE UP (ref 150–400)
RBC # BLD: 4.35 M/UL — SIGNIFICANT CHANGE UP (ref 4.2–5.8)
RBC # FLD: 11.8 % — SIGNIFICANT CHANGE UP (ref 10.3–14.5)
WBC # BLD: 5.7 K/UL — SIGNIFICANT CHANGE UP (ref 3.8–10.5)
WBC # FLD AUTO: 5.7 K/UL — SIGNIFICANT CHANGE UP (ref 3.8–10.5)

## 2024-04-23 PROCEDURE — 99215 OFFICE O/P EST HI 40 MIN: CPT

## 2024-04-23 NOTE — ASSESSMENT
[FreeTextEntry1] : 58 year old man with no significant past medical history who presents to the oncology clinic with locally advanced oropharyngeal cancer.  # oropharyngeal cancer - pre treatment staging PET-CT on 4/8/22: FDG avid LEFT oropharyngeal/base of tongue mass, compatible with the given diagnosis of carcinoma. FDG avid metastatic LEFT level IIa cervical nodes. Mildly FDG avid subcentimeter RIGHT level IIa cervical node, nonspecific. FDG avid first molar RIGHT mandibular focus, likely dental in etiology. - completed RT on 6/28/22, received C7 of Cisplatin on 6/14/22 - FARZANEH on post treatment PET on 9/30/22 - PEG removed in 3/2023 - Follows Speech & Swallow experts - Dr. Kennedy and Dr. Tena follow ups as per their protocols - further imaging as per Dr. Tena -March 2024 CT w/o evidence of disease - Labs from Sept 2023 showed mildly low Free T4 levels at 0.8. Repeat level January at 1.0 -Re-check labs today, including thyroid function - F/U in 4 months   #Dysphagia - Working with speech and swallow to advance diet as tolerated. but as of 4/23/24 with solid foods has been displaying signs of dysphagia - Per pt, speech/swallow is pursuing repeat barium eval - Pt seeing Dr. Tena (ENT) on 4/30 and will discuss then as well

## 2024-04-23 NOTE — RESULTS/DATA
[FreeTextEntry1] : 3/18/24 CT Neck: IMPRESSION: Stable interval follow-up CT examination without evidence of neoplastic disease progression nor recurrence. No new or enlarging cervical lymph nodes.  10/4/22: PET-CT  IMPRESSION: Compared to FDG-PET/CT scan dated 4/8/2022:  1. Interval resolution of FDG avidity in the left base of tongue with either resolution or significant decrease in size of associated lesion.  2. Interval decreased size and avidity of FDG avid left level IIA cervical lymph node. Unchanged nonspecific minimally FDG avid subcentimeter right level IIA lymph node.  3. FDG avidity in the mid and lateral portion of the base of the mouth, likely representing muscle activity. Please correlate clinically.  4. Mild linear peripheral FDG avidity in bilateral base of mouth, probably activity in pooled saliva. Please correlate clinically or with direct visualization.  5. Interval significant decreased avidity of focus in the edentulous area of right mandible.  6. Unchanged enlarged prostate. Previously seen focus in the midportion is no longer demonstrated likely secondary to urinary activity in the prostatic urethra.    3/24/22 Pathology:  Tongue, left base of tongue tumor, excision:   Squamous cell carcinoma, non-keratinizing.   p16 and HPV stains are pending and an addendum will be issued   March 6th, 2023:   CT scan of the neck soft tissue done at [facility] shows:  1. No pulmonary nodules are noted. 2. Previously noted opacities in both lower lobes are no longer present and have resolved.  CT scan of the chest done at [facility] shows:  1. No convincing evidence of thoracic metastasis. 2. Patchy bilateral lower lobe opacities are new compared to 9/30/2022 and compatible with aspiration or infection. 3. Partially imaged left renal 1.5 cm lesion; indeterminate and recommend dedicated CT or MR abdomen to further characterize.

## 2024-04-23 NOTE — PHYSICAL EXAM
[Fully active, able to carry on all pre-disease performance without restriction] : Status 0 - Fully active, able to carry on all pre-disease performance without restriction [Thin] : thin [Normal] : affect appropriate [de-identified] : left cervical lymph node is not palpable.

## 2024-04-23 NOTE — HISTORY OF PRESENT ILLNESS
[Date: ____________] : Patient's last distress assessment performed on [unfilled]. [0 - No Distress] : Distress Level: 0 [de-identified] : 59 yo M with h/o chronic sinusitis who was diagnosed with SCC of the left BOT in March 2022.  He saw ENT, Dr. Erick Sultana, c/o headaches, facial pain and left ear pain.  CT maxillofacial sinus on 2/22/22 showed highly suspicious soft tissue fullness oropharynx left glossotonsilar sulcus, base of tongue/vallecula measuring 2.5 x 2.3 concerning for neoplasm. Suspicious left level 2A lymph node measure 1.6 cm.  Follow up CT neck on 3/4/22 showed soft tissue mass left oropharynx 28.5 x 23.9 x 34.3 mm with involvement of the intrinsic tongue muscles (T4).  2 unilateral pathologic left 2A lymph nodes with cystic necrosis concerning for tory metastasis (N1).  He next saw ENT surgery, Dr. Renny Tena, where he was scheduled for EUA on 3/24/22 which stated "by palpation, the patient was found to have a broadly based tumor epicentered in his tongue base.  Anteriorly, it extended to the proximal tongue base circumvallate papilla region.  It extended laterally to, but not  across the glossotonsillar sulcus anteriorly and more posteriorly, laterally it extended to, but not across the glossopharyngeal sulcus.  The tumor seemed to extend just to the midline both in its anterior and posterior extent.  The tumor seemed to approach, but not cross into the vallecula."  Biopsy c/w non-keratinizing SCC.  Staging PET pending for 4/8/22, which did not show evidence of metastatic disease.   The patient was started on defintiive cRT with weekly cisplatin.  [de-identified] : April 24th, 2023: 57 y/o M Patient presents on s/p C7 weekly Cisplatin with concurrent radiation therapy for SCC of the left BOT. End of RT delayed as patient was admitted 6/20/22 to [facility] with aspiration pneumonia, hyponatremia & pancytopenia, discharged on 6/24/22 and resumed RT on 6/27/22. Completed RT on 6/28/22.  PEG removed in 3/2023. The pt was seen by Dr. Jake Prakash Dysgeusia slowly improving. + Xerostomia improving. + Dysphagia with episodes of aspiration. Weight stable and PEG removed. No odynophagia. + Mild lymphedema. Thick oral secretions resolved.  Fatigue resolved. Erythematous skin around neck resolved. No N/V. No worse than baseline tinnitus. No recent fevers or SOB. No constipation or diarrhea. Again feels more mentally sharp and no fatigue since stopping pain medication.   June 13th, 2023 The pt was seen by Dr. Darinel Albrecht for Laryngoscopy. Laryngoscopy: c FARZANEH.. RT change.   Laryngoscopy Indication: hx scca BOT. Anesthesia: none. Technique: examined with the flexible endoscope.   Posterior Nasopharynx Findings: normal.   Hypopharynx Findings: normal base of the tongue and normal vallecula.  Sep 14th, 2023:  CT scan of the Neck soft tissue done at [facility] imaging shows:  1. No evidence of recurrent or metastatic disease. 2. No significant change from the prior exam.  CT scan of the chest at [facility] imaging shows:  1.  No pulmonary nodules are noted. 2. Previously noted opacities in both lower lobes are no longer present and have resolved.  9/26/23: Patient here for inital visit Transferring care from DR De Guzman  The pt is doing well and he is eating solid food. The pt was seen by Dr. Tena and had a Laryngoscopy with Dr. Darinel Albrecht which was normal. The pt showed post chemo-therapy changes. The pt has finished PT for swallowing. The pt notes some neck stiffness. He is overall doing well. He has been back to work about a year ago.   1/23/24: Patient presents for follow up  Patient doing well Patient swallowing solid food with no pain  Admits mild dysgeusia  12/12/23 Laryngoscopy FARZANEH   4/23/24: Pt here for follow up. Pt is doing well, had scans done 3/18 which showed no evidence of disease. Pt has been incorporating solid foods into his diet more and more, and has had some issues with swallowing (reports aspiration) as he advances his diet. He has been working with speech/swallow, who are considering a repeat barium swallow. Pt follows pulmonology and received recent diagnosis of COPD. Otherwise, no complaints.

## 2024-04-23 NOTE — END OF VISIT
Flu Vaccine given in Left Deltoid per order. No reaction noted. Instructed patient to wait 15 minutes after injection administration. Patient verbalized understanding with no questions or concerns.     [Time Spent: ___ minutes] : I have spent [unfilled] minutes of time on the encounter.

## 2024-04-24 LAB
ALBUMIN SERPL ELPH-MCNC: 4.7 G/DL
ALP BLD-CCNC: 62 U/L
ALT SERPL-CCNC: 21 U/L
ANION GAP SERPL CALC-SCNC: 13 MMOL/L
AST SERPL-CCNC: 24 U/L
BILIRUB SERPL-MCNC: 0.4 MG/DL
BUN SERPL-MCNC: 16 MG/DL
CALCIUM SERPL-MCNC: 10 MG/DL
CHLORIDE SERPL-SCNC: 100 MMOL/L
CO2 SERPL-SCNC: 28 MMOL/L
CREAT SERPL-MCNC: 0.71 MG/DL
EGFR: 105 ML/MIN/1.73M2
GLUCOSE SERPL-MCNC: 93 MG/DL
MAGNESIUM SERPL-MCNC: 2.1 MG/DL
POTASSIUM SERPL-SCNC: 5.2 MMOL/L
PROT SERPL-MCNC: 7 G/DL
SODIUM SERPL-SCNC: 141 MMOL/L
TSH SERPL-ACNC: 1.17 UIU/ML

## 2024-04-24 NOTE — HISTORY OF PRESENT ILLNESS
[FreeTextEntry1] : 59 year old gentleman with squamous cell carcinoma of the base of tongue, s/p chemoradiation completed 6/21/22.  9/12/23 CT neck showed no evidence of recurrent or metastatic disease.  No significant change from the prior exam. 9/12/23 CT chest showed no pulmonary nodules, and previously noted opacities in both lower lobes are no longer present and have resolved.  Interval history: Met with Ernesto Lozano 4/1/24 for asthma:  Symbicort 160, continue montelukast, short steroids  3/18/24 CT neck: IMPRESSION: Stable interval follow-up CT examination without evidence of neoplastic disease progression nor recurrence. No new or enlarging cervical lymph nodes.  xerostomia, dysgeusia, odynophagia, dysphagia, pain,fatigue & weight loss Care team PMR  Francisco Johnson  Med Onc Dr. De Guzman  s/p weekly Cisplatin completed transfer of care to [facility] AmishaYordy CLAUDE Ambrocio Tena   ENT Erick Tam PCP Mik Lozano

## 2024-04-24 NOTE — DISEASE MANAGEMENT
[FreeTextEntry4] : SCCa BOT, p16/HPV16 positive [FreeTextEntry1] : FU biopsy [TTNM] : 4 [de-identified] : Bradford returns today for FU after biopsy performed at last visit:\par \par R neck - melanocytic nevus, predominantly intradermal type, combined with features of blue nevus, specimen transected at the base of specimen. Re-excision to ensure complete removal and prevent recurrence is advised.\par \par He is planned today for punch biopsy to clear deeper component. He would like to discuss the necessity of this. [NTNM] : 1 [MTNM] : 0

## 2024-04-30 ENCOUNTER — APPOINTMENT (OUTPATIENT)
Dept: OTOLARYNGOLOGY | Facility: CLINIC | Age: 59
End: 2024-04-30
Payer: MEDICAID

## 2024-04-30 ENCOUNTER — APPOINTMENT (OUTPATIENT)
Dept: RADIATION ONCOLOGY | Facility: CLINIC | Age: 59
End: 2024-04-30

## 2024-04-30 ENCOUNTER — NON-APPOINTMENT (OUTPATIENT)
Age: 59
End: 2024-04-30

## 2024-04-30 VITALS
BODY MASS INDEX: 23.7 KG/M2 | DIASTOLIC BLOOD PRESSURE: 75 MMHG | HEART RATE: 75 BPM | OXYGEN SATURATION: 99 % | WEIGHT: 160 LBS | HEIGHT: 69 IN | SYSTOLIC BLOOD PRESSURE: 127 MMHG

## 2024-04-30 DIAGNOSIS — C01 MALIGNANT NEOPLASM OF BASE OF TONGUE: ICD-10-CM

## 2024-04-30 PROCEDURE — 99214 OFFICE O/P EST MOD 30 MIN: CPT | Mod: 25

## 2024-04-30 PROCEDURE — 31575 DIAGNOSTIC LARYNGOSCOPY: CPT

## 2024-04-30 NOTE — HISTORY OF PRESENT ILLNESS
[de-identified] :  58 year old male s/p SCCa of the left BOT with metastasis to the left neck. Chemotherapy completed 6/15/2022 with Dr. De Guzman, radiation completed on 6/28/2022 with Dr. Kennedy. he is 22 mo out fro Rx.  3/24 CT FARZANEH.  getting good dental care every 3 mo . did fu w SSLP. eating more solids.

## 2024-04-30 NOTE — PROCEDURE
[Flexible Endoscope] : examined with the flexible endoscope [Normal] : normal vallecula [de-identified] : evaristo MOY [de-identified] : amaris scca BOT

## 2024-04-30 NOTE — CONSULT LETTER
[Dear  ___] : Dear  [unfilled], [Courtesy Letter:] : I had the pleasure of seeing your patient, [unfilled], in my office today. [Please see my note below.] : Please see my note below. [Sincerely,] : Sincerely, [FreeTextEntry2] : Erick Sultana MD ( [place], [place]) [FreeTextEntry3] : Ambrocio Tena MD, FACS     [facility] Associate Chair    Department of Otolaryngology  Professor Otolaryngology & Molecular Medicine [facility]

## 2024-05-17 ENCOUNTER — NON-APPOINTMENT (OUTPATIENT)
Age: 59
End: 2024-05-17

## 2024-05-20 ENCOUNTER — APPOINTMENT (OUTPATIENT)
Dept: PULMONOLOGY | Facility: CLINIC | Age: 59
End: 2024-05-20

## 2024-05-23 ENCOUNTER — APPOINTMENT (OUTPATIENT)
Dept: PHYSICAL MEDICINE AND REHAB | Facility: CLINIC | Age: 59
End: 2024-05-23

## 2024-05-31 NOTE — ED ADULT TRIAGE NOTE - BSA (M2)
Consent: Written consent obtained. Risks include but not limited to lid/brow ptosis, bruising, swelling, diplopia, temporary effect, incomplete chemical denervation. Lcl Root Units: 0 Additional Area 2 Location: Upper cutaneous lip Show Levator Superior Units: Yes Price (Use Numbers Only, No Special Characters Or $): 263 Expiration Date (Month Year): 5/26 Incrementing Botox Units: By 0.5 Units Show Right And Left Brow Units: No Additional Area 3 Location: masseters Additional Area 1 Units: 37.5 Detail Level: Zone Dilution (U/0.1 Cc): 1 Additional Area 1 Location: Face Lot #: [id number] Post-Care Instructions: Patient instructed to not lie down for 4 hours and limit physical activity for 24 hours. Patient instructed not to travel by airplane for 48 hours. 1.85

## 2024-06-20 ENCOUNTER — APPOINTMENT (OUTPATIENT)
Dept: PHYSICAL MEDICINE AND REHAB | Facility: CLINIC | Age: 59
End: 2024-06-20
Payer: MEDICAID

## 2024-06-20 VITALS
BODY MASS INDEX: 23.7 KG/M2 | SYSTOLIC BLOOD PRESSURE: 150 MMHG | HEIGHT: 69 IN | DIASTOLIC BLOOD PRESSURE: 65 MMHG | WEIGHT: 160 LBS | RESPIRATION RATE: 14 BRPM | HEART RATE: 81 BPM

## 2024-06-20 DIAGNOSIS — R13.10 DYSPHAGIA, UNSPECIFIED: ICD-10-CM

## 2024-06-20 DIAGNOSIS — Z91.89 OTHER SPECIFIED PERSONAL RISK FACTORS, NOT ELSEWHERE CLASSIFIED: ICD-10-CM

## 2024-06-20 DIAGNOSIS — K11.7 DISTURBANCES OF SALIVARY SECRETION: ICD-10-CM

## 2024-06-20 DIAGNOSIS — M79.2 NEURALGIA AND NEURITIS, UNSPECIFIED: ICD-10-CM

## 2024-06-20 PROCEDURE — 99214 OFFICE O/P EST MOD 30 MIN: CPT

## 2024-06-20 NOTE — REVIEW OF SYSTEMS
[Negative] : Heme/Lymph [FreeTextEntry4] : +Dry mouth  [de-identified] : +numbness of toes [FreeTextEntry9] : +neck stiffness

## 2024-06-20 NOTE — ASSESSMENT
[FreeTextEntry1] : 58 year old male presenting for follow-up  #Left groin pain: -Resolved   #Neuropathic pain/Neuropathy: -Decrease Gabapentin to 600mg TID-rx sent   #At risk for lymphedema: -No clinical signs of lymphedema -Lymphedema education provided to patient -Continue to monitor closely monitor  #Xerostomia -Advised to remain off of pilocarpine until cleared by pulmonology  #Dysphagia: -MBS -SLP evaluation    Follow up in 3 month.

## 2024-06-20 NOTE — HISTORY OF PRESENT ILLNESS
[FreeTextEntry1] : Mr. Moses is a 59 year old male with OPSCC (BOT, cT2 N1, left level 2A lymphadenopathy), undergoing definitive chemoradiation with weekly Cisplatin. He completed chemotherapy with cisplatin and radiation therapy in June 2022.  Reports neuropathy is improving interested in decreasing gabapentin.  Reporting some worsening difficulty to swallow function.  Pending MBS.  Denies any worsening of swelling in his submental region.  Following with pulmonology not taking pilocarpine but still has dry mouth.  Reports hip/groin pain resolved.

## 2024-07-14 ENCOUNTER — RESULT CHARGE (OUTPATIENT)
Age: 59
End: 2024-07-14

## 2024-07-15 ENCOUNTER — APPOINTMENT (OUTPATIENT)
Dept: PULMONOLOGY | Facility: CLINIC | Age: 59
End: 2024-07-15
Payer: MEDICAID

## 2024-07-15 VITALS — WEIGHT: 159 LBS | BODY MASS INDEX: 23.55 KG/M2 | HEIGHT: 69 IN

## 2024-07-15 VITALS
SYSTOLIC BLOOD PRESSURE: 126 MMHG | DIASTOLIC BLOOD PRESSURE: 72 MMHG | OXYGEN SATURATION: 98 % | RESPIRATION RATE: 16 BRPM | HEART RATE: 82 BPM

## 2024-07-15 DIAGNOSIS — D72.10 EOSINOPHILIA, UNSPECIFIED: ICD-10-CM

## 2024-07-15 DIAGNOSIS — J45.909 UNSPECIFIED ASTHMA, UNCOMPLICATED: ICD-10-CM

## 2024-07-15 DIAGNOSIS — R76.8 OTHER SPECIFIED ABNORMAL IMMUNOLOGICAL FINDINGS IN SERUM: ICD-10-CM

## 2024-07-15 DIAGNOSIS — J44.89 OTHER SPECIFIED CHRONIC OBSTRUCTIVE PULMONARY DISEASE: ICD-10-CM

## 2024-07-15 PROCEDURE — 99215 OFFICE O/P EST HI 40 MIN: CPT | Mod: 25

## 2024-07-15 PROCEDURE — 94010 BREATHING CAPACITY TEST: CPT

## 2024-07-15 RX ORDER — BUDESONIDE AND FORMOTEROL FUMARATE DIHYDRATE 160; 4.5 UG/1; UG/1
160-4.5 AEROSOL RESPIRATORY (INHALATION) TWICE DAILY
Qty: 3 | Refills: 3 | Status: ACTIVE | COMMUNITY
Start: 2024-07-15 | End: 1900-01-01

## 2024-07-15 RX ORDER — BUDESONIDE AND FORMOTEROL FUMARATE 160; 4.5 UG/1; UG/1
160-4.5 AEROSOL, METERED RESPIRATORY (INHALATION)
Refills: 0 | Status: ACTIVE | COMMUNITY

## 2024-07-18 RX ORDER — DUPILUMAB 300 MG/2ML
300 INJECTION, SOLUTION SUBCUTANEOUS
Qty: 1 | Refills: 0 | Status: ACTIVE | COMMUNITY
Start: 2024-07-17 | End: 1900-01-01

## 2024-07-18 RX ORDER — DUPILUMAB 300 MG/2ML
300 INJECTION, SOLUTION SUBCUTANEOUS
Qty: 1 | Refills: 11 | Status: ACTIVE | COMMUNITY
Start: 2024-07-17 | End: 1900-01-01

## 2024-07-22 RX ORDER — DUPILUMAB 300 MG/2ML
300 INJECTION, SOLUTION SUBCUTANEOUS
Qty: 1 | Refills: 11 | Status: COMPLETED | COMMUNITY
Start: 2024-07-16 | End: 2024-07-22

## 2024-08-09 ENCOUNTER — OUTPATIENT (OUTPATIENT)
Dept: OUTPATIENT SERVICES | Facility: HOSPITAL | Age: 59
LOS: 1 days | Discharge: HOME | End: 2024-08-09

## 2024-08-09 DIAGNOSIS — C02.9 MALIGNANT NEOPLASM OF TONGUE, UNSPECIFIED: ICD-10-CM

## 2024-08-13 NOTE — HISTORY OF PRESENT ILLNESS
[FreeTextEntry1] : 59 year old gentleman with squamous cell carcinoma of the base of tongue, s/p chemoradiation completed 6/21/22.  9/12/23 CT neck showed no evidence of recurrent or metastatic disease.  No significant change from the prior exam. 9/12/23 CT chest showed no pulmonary nodules, and previously noted opacities in both lower lobes are no longer present and have resolved.  Interval history: Met with Ernesto Lozano 4/1/24 for asthma:  Symbicort 160, continue montelukast, short steroids  3/18/24 CT neck: IMPRESSION: Stable interval follow-up CT examination without evidence of neoplastic disease progression nor recurrence. No new or enlarging cervical lymph nodes.  xerostomia, dysgeusia, odynophagia, dysphagia, pain,fatigue & weight loss Care team PMR  Francisco Johnson  Med Onc Dr. De Guzman  s/p weekly Cisplatin completed transfer of care to [facility] AmishaYordy CLAUDE Ambrocio Tena   ENT Erick Tam PCP Mik Lozano

## 2024-08-19 ENCOUNTER — RESULT REVIEW (OUTPATIENT)
Age: 59
End: 2024-08-19

## 2024-08-19 ENCOUNTER — APPOINTMENT (OUTPATIENT)
Dept: HEMATOLOGY ONCOLOGY | Facility: CLINIC | Age: 59
End: 2024-08-19
Payer: MEDICAID

## 2024-08-19 VITALS
WEIGHT: 163.01 LBS | DIASTOLIC BLOOD PRESSURE: 67 MMHG | HEIGHT: 69 IN | OXYGEN SATURATION: 98 % | HEART RATE: 70 BPM | BODY MASS INDEX: 24.14 KG/M2 | SYSTOLIC BLOOD PRESSURE: 123 MMHG

## 2024-08-19 DIAGNOSIS — C01 MALIGNANT NEOPLASM OF BASE OF TONGUE: ICD-10-CM

## 2024-08-19 LAB
BASOPHILS # BLD AUTO: 0.1 K/UL — SIGNIFICANT CHANGE UP (ref 0–0.2)
BASOPHILS NFR BLD AUTO: 1.3 % — SIGNIFICANT CHANGE UP (ref 0–2)
EOSINOPHIL # BLD AUTO: 1.8 K/UL — HIGH (ref 0–0.5)
EOSINOPHIL NFR BLD AUTO: 21.6 % — HIGH (ref 0–6)
HCT VFR BLD CALC: 42.9 % — SIGNIFICANT CHANGE UP (ref 39–50)
HGB BLD-MCNC: 14.4 G/DL — SIGNIFICANT CHANGE UP (ref 13–17)
LYMPHOCYTES # BLD AUTO: 0.7 K/UL — LOW (ref 1–3.3)
LYMPHOCYTES # BLD AUTO: 9.1 % — LOW (ref 13–44)
MCHC RBC-ENTMCNC: 33.4 PG — SIGNIFICANT CHANGE UP (ref 27–34)
MCHC RBC-ENTMCNC: 33.6 G/DL — SIGNIFICANT CHANGE UP (ref 32–36)
MCV RBC AUTO: 99.4 FL — SIGNIFICANT CHANGE UP (ref 80–100)
MONOCYTES # BLD AUTO: 0.4 K/UL — SIGNIFICANT CHANGE UP (ref 0–0.9)
MONOCYTES NFR BLD AUTO: 5 % — SIGNIFICANT CHANGE UP (ref 2–14)
NEUTROPHILS # BLD AUTO: 5.1 K/UL — SIGNIFICANT CHANGE UP (ref 1.8–7.4)
NEUTROPHILS NFR BLD AUTO: 63 % — SIGNIFICANT CHANGE UP (ref 43–77)
PLATELET # BLD AUTO: 215 K/UL — SIGNIFICANT CHANGE UP (ref 150–400)
RBC # BLD: 4.32 M/UL — SIGNIFICANT CHANGE UP (ref 4.2–5.8)
RBC # FLD: 11.8 % — SIGNIFICANT CHANGE UP (ref 10.3–14.5)
WBC # BLD: 8.1 K/UL — SIGNIFICANT CHANGE UP (ref 3.8–10.5)
WBC # FLD AUTO: 8.1 K/UL — SIGNIFICANT CHANGE UP (ref 3.8–10.5)

## 2024-08-19 PROCEDURE — 99214 OFFICE O/P EST MOD 30 MIN: CPT

## 2024-08-20 LAB
ALBUMIN SERPL ELPH-MCNC: 4.5 G/DL
ALP BLD-CCNC: 62 U/L
ALT SERPL-CCNC: 17 U/L
ANION GAP SERPL CALC-SCNC: 9 MMOL/L
AST SERPL-CCNC: 19 U/L
BILIRUB SERPL-MCNC: 0.3 MG/DL
BUN SERPL-MCNC: 21 MG/DL
CALCIUM SERPL-MCNC: 9.9 MG/DL
CHLORIDE SERPL-SCNC: 100 MMOL/L
CO2 SERPL-SCNC: 30 MMOL/L
CREAT SERPL-MCNC: 0.75 MG/DL
EGFR: 103 ML/MIN/1.73M2
GLUCOSE SERPL-MCNC: 98 MG/DL
MAGNESIUM SERPL-MCNC: 2.1 MG/DL
POTASSIUM SERPL-SCNC: 5 MMOL/L
PROT SERPL-MCNC: 6.7 G/DL
SODIUM SERPL-SCNC: 139 MMOL/L
TSH SERPL-ACNC: 2.15 UIU/ML

## 2024-08-20 NOTE — ASSESSMENT
[FreeTextEntry1] : 58 year old man with no significant past medical history who presents to the oncology clinic with locally advanced oropharyngeal cancer.  # oropharyngeal cancer - pre treatment staging PET-CT on 4/8/22: FDG avid LEFT oropharyngeal/base of tongue mass, compatible with the given diagnosis of carcinoma. FDG avid metastatic LEFT level IIa cervical nodes. Mildly FDG avid subcentimeter RIGHT level IIa cervical node, nonspecific. FDG avid first molar RIGHT mandibular focus, likely dental in etiology. - completed RT on 6/28/22, received C7 of Cisplatin on 6/14/22 - FARZANEH on post treatment PET on 9/30/22 - PEG removed in 3/2023 - Follows Speech & Swallow experts - Dr. Kennedy and Dr. Tena follow ups as per their protocols -March 2024 CT w/o evidence of disease - further imaging as per Dr. Tena, recommended repeat imaging Fall 2024 - Labs from Sept 2023 showed mildly low Free T4 levels at 0.8. Repeat level January at 1.0 -Re-check labs today, including thyroid function - F/U in 3-4 months   #Dysphagia - Working with speech and swallow to advance diet as tolerated. but as of 4/23/24 with solid foods has been displaying signs of dysphagia - Per pt, speech/swallow is pursuing repeat barium eval - Dysphagia resolved, did not go for repeat barium eval

## 2024-08-20 NOTE — PHYSICAL EXAM
[Fully active, able to carry on all pre-disease performance without restriction] : Status 0 - Fully active, able to carry on all pre-disease performance without restriction [Thin] : thin [Normal] : affect appropriate [de-identified] : left cervical lymph node is not palpable.

## 2024-08-20 NOTE — HISTORY OF PRESENT ILLNESS
[Date: ____________] : Patient's last distress assessment performed on [unfilled]. [0 - No Distress] : Distress Level: 0 [de-identified] : 57 yo M with h/o chronic sinusitis who was diagnosed with SCC of the left BOT in March 2022.  He saw ENT, Dr. Erick Sultana, c/o headaches, facial pain and left ear pain.  CT maxillofacial sinus on 2/22/22 showed highly suspicious soft tissue fullness oropharynx left glossotonsilar sulcus, base of tongue/vallecula measuring 2.5 x 2.3 concerning for neoplasm. Suspicious left level 2A lymph node measure 1.6 cm.  Follow up CT neck on 3/4/22 showed soft tissue mass left oropharynx 28.5 x 23.9 x 34.3 mm with involvement of the intrinsic tongue muscles (T4).  2 unilateral pathologic left 2A lymph nodes with cystic necrosis concerning for tory metastasis (N1).  He next saw ENT surgery, Dr. Renny Tena, where he was scheduled for EUA on 3/24/22 which stated "by palpation, the patient was found to have a broadly based tumor epicentered in his tongue base.  Anteriorly, it extended to the proximal tongue base circumvallate papilla region.  It extended laterally to, but not  across the glossotonsillar sulcus anteriorly and more posteriorly, laterally it extended to, but not across the glossopharyngeal sulcus.  The tumor seemed to extend just to the midline both in its anterior and posterior extent.  The tumor seemed to approach, but not cross into the vallecula."  Biopsy c/w non-keratinizing SCC.  Staging PET pending for 4/8/22, which did not show evidence of metastatic disease.   The patient was started on defintiive cRT with weekly cisplatin.  [de-identified] : April 24th, 2023: 59 y/o M Patient presents on s/p C7 weekly Cisplatin with concurrent radiation therapy for SCC of the left BOT. End of RT delayed as patient was admitted 6/20/22 to [facility] with aspiration pneumonia, hyponatremia & pancytopenia, discharged on 6/24/22 and resumed RT on 6/27/22. Completed RT on 6/28/22.  PEG removed in 3/2023. The pt was seen by Dr. Jake Prakash Dysgeusia slowly improving. + Xerostomia improving. + Dysphagia with episodes of aspiration. Weight stable and PEG removed. No odynophagia. + Mild lymphedema. Thick oral secretions resolved.  Fatigue resolved. Erythematous skin around neck resolved. No N/V. No worse than baseline tinnitus. No recent fevers or SOB. No constipation or diarrhea. Again feels more mentally sharp and no fatigue since stopping pain medication.   June 13th, 2023 The pt was seen by Dr. Darinel Albrecht for Laryngoscopy. Laryngoscopy: c FARZANEH.. RT change.   Laryngoscopy Indication: hx scca BOT. Anesthesia: none. Technique: examined with the flexible endoscope.   Posterior Nasopharynx Findings: normal.   Hypopharynx Findings: normal base of the tongue and normal vallecula.  Sep 14th, 2023:  CT scan of the Neck soft tissue done at [facility] imaging shows:  1. No evidence of recurrent or metastatic disease. 2. No significant change from the prior exam.  CT scan of the chest at [facility] imaging shows:  1.  No pulmonary nodules are noted. 2. Previously noted opacities in both lower lobes are no longer present and have resolved.  9/26/23: Patient here for inital visit Transferring care from DR De Guzman  The pt is doing well and he is eating solid food. The pt was seen by Dr. Tena and had a Laryngoscopy with Dr. Darinel Albrecht which was normal. The pt showed post chemo-therapy changes. The pt has finished PT for swallowing. The pt notes some neck stiffness. He is overall doing well. He has been back to work about a year ago.   1/23/24: Patient presents for follow up  Patient doing well Patient swallowing solid food with no pain  Admits mild dysgeusia  12/12/23 Laryngoscopy FARZANEH   4/23/24: Pt here for follow up. Pt is doing well, had scans done 3/18 which showed no evidence of disease. Pt has been incorporating solid foods into his diet more and more, and has had some issues with swallowing (reports aspiration) as he advances his diet. He has been working with speech/swallow, who are considering a repeat barium swallow. Pt follows pulmonology and received recent diagnosis of COPD. Otherwise, no complaints.  8/19/24: Patient presents for follow up Patient taking Symbicort/Singular for COPD Neuropathy improving, Decreased Gabapentin to 300 TID in June  Reports Mild Dysgeusia Admits dry mouth. he was taking Pilocarpine but had to stop given h/o Asthma  Denies dysphagia, new/worsening pain, decreased appetite, n/v/d

## 2024-08-20 NOTE — PHYSICAL EXAM
[Fully active, able to carry on all pre-disease performance without restriction] : Status 0 - Fully active, able to carry on all pre-disease performance without restriction [Thin] : thin [Normal] : affect appropriate [de-identified] : left cervical lymph node is not palpable.

## 2024-08-20 NOTE — PHYSICAL EXAM
[Fully active, able to carry on all pre-disease performance without restriction] : Status 0 - Fully active, able to carry on all pre-disease performance without restriction [Thin] : thin [Normal] : affect appropriate [de-identified] : left cervical lymph node is not palpable.

## 2024-08-20 NOTE — HISTORY OF PRESENT ILLNESS
[Date: ____________] : Patient's last distress assessment performed on [unfilled]. [0 - No Distress] : Distress Level: 0 [de-identified] : 59 yo M with h/o chronic sinusitis who was diagnosed with SCC of the left BOT in March 2022.  He saw ENT, Dr. Erick Sultana, c/o headaches, facial pain and left ear pain.  CT maxillofacial sinus on 2/22/22 showed highly suspicious soft tissue fullness oropharynx left glossotonsilar sulcus, base of tongue/vallecula measuring 2.5 x 2.3 concerning for neoplasm. Suspicious left level 2A lymph node measure 1.6 cm.  Follow up CT neck on 3/4/22 showed soft tissue mass left oropharynx 28.5 x 23.9 x 34.3 mm with involvement of the intrinsic tongue muscles (T4).  2 unilateral pathologic left 2A lymph nodes with cystic necrosis concerning for tory metastasis (N1).  He next saw ENT surgery, Dr. Renny Tena, where he was scheduled for EUA on 3/24/22 which stated "by palpation, the patient was found to have a broadly based tumor epicentered in his tongue base.  Anteriorly, it extended to the proximal tongue base circumvallate papilla region.  It extended laterally to, but not  across the glossotonsillar sulcus anteriorly and more posteriorly, laterally it extended to, but not across the glossopharyngeal sulcus.  The tumor seemed to extend just to the midline both in its anterior and posterior extent.  The tumor seemed to approach, but not cross into the vallecula."  Biopsy c/w non-keratinizing SCC.  Staging PET pending for 4/8/22, which did not show evidence of metastatic disease.   The patient was started on defintiive cRT with weekly cisplatin.  [de-identified] : April 24th, 2023: 57 y/o M Patient presents on s/p C7 weekly Cisplatin with concurrent radiation therapy for SCC of the left BOT. End of RT delayed as patient was admitted 6/20/22 to [facility] with aspiration pneumonia, hyponatremia & pancytopenia, discharged on 6/24/22 and resumed RT on 6/27/22. Completed RT on 6/28/22.  PEG removed in 3/2023. The pt was seen by Dr. Jake Prakash Dysgeusia slowly improving. + Xerostomia improving. + Dysphagia with episodes of aspiration. Weight stable and PEG removed. No odynophagia. + Mild lymphedema. Thick oral secretions resolved.  Fatigue resolved. Erythematous skin around neck resolved. No N/V. No worse than baseline tinnitus. No recent fevers or SOB. No constipation or diarrhea. Again feels more mentally sharp and no fatigue since stopping pain medication.   June 13th, 2023 The pt was seen by Dr. Darinel Albrecht for Laryngoscopy. Laryngoscopy: c FARZANEH.. RT change.   Laryngoscopy Indication: hx scca BOT. Anesthesia: none. Technique: examined with the flexible endoscope.   Posterior Nasopharynx Findings: normal.   Hypopharynx Findings: normal base of the tongue and normal vallecula.  Sep 14th, 2023:  CT scan of the Neck soft tissue done at [facility] imaging shows:  1. No evidence of recurrent or metastatic disease. 2. No significant change from the prior exam.  CT scan of the chest at [facility] imaging shows:  1.  No pulmonary nodules are noted. 2. Previously noted opacities in both lower lobes are no longer present and have resolved.  9/26/23: Patient here for inital visit Transferring care from DR De Guzman  The pt is doing well and he is eating solid food. The pt was seen by Dr. Tena and had a Laryngoscopy with Dr. Darinel Albrecht which was normal. The pt showed post chemo-therapy changes. The pt has finished PT for swallowing. The pt notes some neck stiffness. He is overall doing well. He has been back to work about a year ago.   1/23/24: Patient presents for follow up  Patient doing well Patient swallowing solid food with no pain  Admits mild dysgeusia  12/12/23 Laryngoscopy FARZANEH   4/23/24: Pt here for follow up. Pt is doing well, had scans done 3/18 which showed no evidence of disease. Pt has been incorporating solid foods into his diet more and more, and has had some issues with swallowing (reports aspiration) as he advances his diet. He has been working with speech/swallow, who are considering a repeat barium swallow. Pt follows pulmonology and received recent diagnosis of COPD. Otherwise, no complaints.  8/19/24: Patient presents for follow up Patient taking Symbicort/Singular for COPD Neuropathy improving, Decreased Gabapentin to 300 TID in June  Reports Mild Dysgeusia Admits dry mouth. he was taking Pilocarpine but had to stop given h/o Asthma  Denies dysphagia, new/worsening pain, decreased appetite, n/v/d

## 2024-08-20 NOTE — HISTORY OF PRESENT ILLNESS
[Date: ____________] : Patient's last distress assessment performed on [unfilled]. [0 - No Distress] : Distress Level: 0 [de-identified] : 59 yo M with h/o chronic sinusitis who was diagnosed with SCC of the left BOT in March 2022.  He saw ENT, Dr. Erick Sultana, c/o headaches, facial pain and left ear pain.  CT maxillofacial sinus on 2/22/22 showed highly suspicious soft tissue fullness oropharynx left glossotonsilar sulcus, base of tongue/vallecula measuring 2.5 x 2.3 concerning for neoplasm. Suspicious left level 2A lymph node measure 1.6 cm.  Follow up CT neck on 3/4/22 showed soft tissue mass left oropharynx 28.5 x 23.9 x 34.3 mm with involvement of the intrinsic tongue muscles (T4).  2 unilateral pathologic left 2A lymph nodes with cystic necrosis concerning for tory metastasis (N1).  He next saw ENT surgery, Dr. Renny Tena, where he was scheduled for EUA on 3/24/22 which stated "by palpation, the patient was found to have a broadly based tumor epicentered in his tongue base.  Anteriorly, it extended to the proximal tongue base circumvallate papilla region.  It extended laterally to, but not  across the glossotonsillar sulcus anteriorly and more posteriorly, laterally it extended to, but not across the glossopharyngeal sulcus.  The tumor seemed to extend just to the midline both in its anterior and posterior extent.  The tumor seemed to approach, but not cross into the vallecula."  Biopsy c/w non-keratinizing SCC.  Staging PET pending for 4/8/22, which did not show evidence of metastatic disease.   The patient was started on defintiive cRT with weekly cisplatin.  [de-identified] : April 24th, 2023: 57 y/o M Patient presents on s/p C7 weekly Cisplatin with concurrent radiation therapy for SCC of the left BOT. End of RT delayed as patient was admitted 6/20/22 to [facility] with aspiration pneumonia, hyponatremia & pancytopenia, discharged on 6/24/22 and resumed RT on 6/27/22. Completed RT on 6/28/22.  PEG removed in 3/2023. The pt was seen by Dr. Jake Prakash Dysgeusia slowly improving. + Xerostomia improving. + Dysphagia with episodes of aspiration. Weight stable and PEG removed. No odynophagia. + Mild lymphedema. Thick oral secretions resolved.  Fatigue resolved. Erythematous skin around neck resolved. No N/V. No worse than baseline tinnitus. No recent fevers or SOB. No constipation or diarrhea. Again feels more mentally sharp and no fatigue since stopping pain medication.   June 13th, 2023 The pt was seen by Dr. Darinel Albrecht for Laryngoscopy. Laryngoscopy: c FARZANEH.. RT change.   Laryngoscopy Indication: hx scca BOT. Anesthesia: none. Technique: examined with the flexible endoscope.   Posterior Nasopharynx Findings: normal.   Hypopharynx Findings: normal base of the tongue and normal vallecula.  Sep 14th, 2023:  CT scan of the Neck soft tissue done at [facility] imaging shows:  1. No evidence of recurrent or metastatic disease. 2. No significant change from the prior exam.  CT scan of the chest at [facility] imaging shows:  1.  No pulmonary nodules are noted. 2. Previously noted opacities in both lower lobes are no longer present and have resolved.  9/26/23: Patient here for inital visit Transferring care from DR De Guzman  The pt is doing well and he is eating solid food. The pt was seen by Dr. Tena and had a Laryngoscopy with Dr. Darinel Albrecht which was normal. The pt showed post chemo-therapy changes. The pt has finished PT for swallowing. The pt notes some neck stiffness. He is overall doing well. He has been back to work about a year ago.   1/23/24: Patient presents for follow up  Patient doing well Patient swallowing solid food with no pain  Admits mild dysgeusia  12/12/23 Laryngoscopy FARZANEH   4/23/24: Pt here for follow up. Pt is doing well, had scans done 3/18 which showed no evidence of disease. Pt has been incorporating solid foods into his diet more and more, and has had some issues with swallowing (reports aspiration) as he advances his diet. He has been working with speech/swallow, who are considering a repeat barium swallow. Pt follows pulmonology and received recent diagnosis of COPD. Otherwise, no complaints.  8/19/24: Patient presents for follow up Patient taking Symbicort/Singular for COPD Neuropathy improving, Decreased Gabapentin to 300 TID in June  Reports Mild Dysgeusia Admits dry mouth. he was taking Pilocarpine but had to stop given h/o Asthma  Denies dysphagia, new/worsening pain, decreased appetite, n/v/d

## 2024-08-27 RX ORDER — GABAPENTIN 100 MG/1
100 CAPSULE ORAL 3 TIMES DAILY
Qty: 180 | Refills: 1 | Status: ACTIVE | COMMUNITY
Start: 2024-08-27 | End: 1900-01-01

## 2024-09-06 ENCOUNTER — NON-APPOINTMENT (OUTPATIENT)
Age: 59
End: 2024-09-06

## 2024-09-06 ENCOUNTER — APPOINTMENT (OUTPATIENT)
Dept: RADIATION ONCOLOGY | Facility: CLINIC | Age: 59
End: 2024-09-06
Payer: MEDICAID

## 2024-09-06 VITALS
BODY MASS INDEX: 23.7 KG/M2 | WEIGHT: 160 LBS | TEMPERATURE: 97.6 F | RESPIRATION RATE: 16 BRPM | HEIGHT: 69 IN | HEART RATE: 70 BPM | DIASTOLIC BLOOD PRESSURE: 80 MMHG | SYSTOLIC BLOOD PRESSURE: 161 MMHG | OXYGEN SATURATION: 98 %

## 2024-09-06 DIAGNOSIS — C01 MALIGNANT NEOPLASM OF BASE OF TONGUE: ICD-10-CM

## 2024-09-06 PROCEDURE — 99214 OFFICE O/P EST MOD 30 MIN: CPT

## 2024-09-06 PROCEDURE — G2211 COMPLEX E/M VISIT ADD ON: CPT | Mod: NC

## 2024-09-06 NOTE — REVIEW OF SYSTEMS
[Dysphagia] : dysphagia [Negative] : Allergic/Immunologic [Dysphagia: Grade 1 - Symptomatic, able to eat regular diet] : Dysphagia: Grade 1 - Symptomatic, able to eat regular diet [Edema Limbs: Grade 0] : Edema Limbs: Grade 0  [Fatigue: Grade 0] : Fatigue: Grade 0 [Localized Edema: Grade 0] : Localized Edema: Grade 0  [Neck Edema: Grade 0] : Neck Edema: Grade 0 [Xerostomia: Grade 1 - Symptomatic (e.g., dry or thick saliva) without significant dietary alteration; unstimulated saliva flow >0.2 ml/min] : Xerostomia: Grade 1 - Symptomatic (e.g., dry or thick saliva) without significant dietary alteration; unstimulated saliva flow >0.2 ml/min [Dysgeusia: Grade 1- Altered taste but no change in diet] : Dysgeusia: Grade 1 - Altered taste but no change in diet [Skin Hyperpigmentation: Grade 0] : Skin Hyperpigmentation: Grade 0

## 2024-09-06 NOTE — PHYSICAL EXAM
[Extraocular Movements] : extraocular movements were intact [Outer Ear] : the ears and nose were normal in appearance [Cervical Lymph Nodes Enlarged Anterior Bilaterally] : anterior cervical [Cervical Lymph Nodes Enlarged Posterior Bilaterally] : posterior cervical [Supraclavicular Lymph Nodes Enlarged Bilaterally] : supraclavicular [Normal] : no focal deficits

## 2024-09-06 NOTE — HISTORY OF PRESENT ILLNESS
[FreeTextEntry1] : 59 year old gentleman with squamous cell carcinoma of the base of tongue, s/p chemoradiation completed 6/21/22.  9/12/23 CT neck showed no evidence of recurrent or metastatic disease.  No significant change from the prior exam. 9/12/23 CT chest showed no pulmonary nodules, and previously noted opacities in both lower lobes are no longer present and have resolved.  Interval history: Met with Ernesto Lozano 4/1/24 for asthma:  Symbicort 160, continue montelukast, short steroids  3/18/24 CT neck was stable, without evidence of neoplastic disease progression nor recurrence. No new or enlarging cervical lymph nodes.  Reports stable xerostomia.  Manageable dysgeusia/dysphagia with certain foods.  Supplements with Ensure out of habit.  No odynophagia.  No weight loss.  Care team PMR  Francisco Johnson  Med Onc Avani Gaytan CLAUDELANDY Tena   ENT Erick Tam PCP Mik Lozano

## 2024-09-23 ENCOUNTER — APPOINTMENT (OUTPATIENT)
Dept: PULMONOLOGY | Facility: CLINIC | Age: 59
End: 2024-09-23
Payer: MEDICAID

## 2024-09-23 VITALS
RESPIRATION RATE: 16 BRPM | BODY MASS INDEX: 23.7 KG/M2 | WEIGHT: 160 LBS | HEART RATE: 67 BPM | OXYGEN SATURATION: 97 % | HEIGHT: 69 IN | SYSTOLIC BLOOD PRESSURE: 132 MMHG | DIASTOLIC BLOOD PRESSURE: 78 MMHG

## 2024-09-23 DIAGNOSIS — D72.10 EOSINOPHILIA, UNSPECIFIED: ICD-10-CM

## 2024-09-23 DIAGNOSIS — J44.89 OTHER SPECIFIED CHRONIC OBSTRUCTIVE PULMONARY DISEASE: ICD-10-CM

## 2024-09-23 DIAGNOSIS — R76.8 OTHER SPECIFIED ABNORMAL IMMUNOLOGICAL FINDINGS IN SERUM: ICD-10-CM

## 2024-09-23 PROCEDURE — 99214 OFFICE O/P EST MOD 30 MIN: CPT

## 2024-09-23 RX ORDER — ALBUTEROL SULFATE 90 UG/1
108 (90 BASE) INHALANT RESPIRATORY (INHALATION)
Qty: 2 | Refills: 6 | Status: ACTIVE | COMMUNITY
Start: 2024-09-23 | End: 1900-01-01

## 2024-09-23 NOTE — ASSESSMENT
[FreeTextEntry1] : 58 yo man for asthma s/p treatment for cancer of base of tongue, nonkeratinizing SCC treated with radiation and then chemotherapy with cisplatin Had one episode of aspiration pneumonia in June 2022 in middle of treatment, then completed it Staging: SCCa BOT, p16/HPV16 positive. TNM Stage: c T 4 N 1 M 0  AJCC Stage (8th Ed): III.  Currently being followed by Dr Kennedy and Dr Lucila Shi of environmental allergies for many years Works in a pet store--primarily reptiles When initally seen, spirometry showed severe obstruction with probable airtrapping He has been treated with symbicort 160 now and is taking it regularly, using montelukast Good response to the short steroids but now off of them Labs April 3: Eosinophilia 32% IgE 678 KU/L Mulitple specific IgE to grass, tree dogs cats, mold NORMAL ESR and CRP  Interim: Taking his Symbicort 160 and singulair, PRN albuterol Generally OK Comes back to discuss Dupixent use This was reviewed again with Gladys in attendance His sena was reviewed as well as elev of his IgE and Eos  Imp 58 yo man s/p treatment of cancer of the tongue Severe obstructive disease with elev IgE, spec IgE and elevated eosinophils Patient has had discussion re: dupixent and he does wish to proceed with its use Return in three months for leighton and sena

## 2024-09-23 NOTE — CONSULT LETTER
[Dear  ___] : Dear  [unfilled], [FreeTextEntry1] : I had the pleasure of evaluating your patient, MARILYN VINCENT , in the office today.  Please review my consultation and evaluation report that follows below.  Please do not hesitate to call me if further information is necessary or if you wish to discuss ongoing care or diagnostic work-up.    I very much appreciate your referral and it is a privilege to be able to provide care for your patient.  Sincerely,   Ernesto Lozano MD, MHCM, FACP, SITA-C Pulmonary Medicine  of Medicine Delroy maryann Dyson [facility] [email or web address] in [address] [place]  [address]    Fax   [facility] at [address]  [phone]

## 2024-09-23 NOTE — HISTORY OF PRESENT ILLNESS
[TextBox_4] : 58 yo man for asthma s/p treatment for cancer of base of tongue, nonkeratinizing SCC treated with radiation and then chemotherapy with cisplatin Had one episode of aspiration pneumonia in June 2022 in middle of treatment, then completed it Staging: SCCa BOT, p16/HPV16 positive. TNM Stage: c T 4 N 1 M 0  AJCC Stage (8th Ed): III.  Currently being followed by Dr Kennedy and Dr Lucila Shi of environmental allergies for many years Works in a pet store--primarily reptiles  When initally seen, spirometry showed severe obstruction with probable airtrapping He has been treated with symbicort 160 now and is taking it regularly, using montelukast Good response to the short steroids but now off of them   Labs April 3: Eosinophilia 32% IgE 678 KU/L Mulitple specific IgE to grass, tree dogs cats, mold NORMAL ESR and CRP  Interim: Taking his Symbicort 160 and singulair, PRN albuterol Generally OK Comes back to discuss Dupixent use This was reviewed again with Gladys in attendance His sena was reviewed as well as elev of his IgE and Eos

## 2024-10-01 ENCOUNTER — OUTPATIENT (OUTPATIENT)
Dept: OUTPATIENT SERVICES | Facility: HOSPITAL | Age: 59
LOS: 1 days | End: 2024-10-01

## 2024-10-01 ENCOUNTER — NON-APPOINTMENT (OUTPATIENT)
Age: 59
End: 2024-10-01

## 2024-10-01 ENCOUNTER — APPOINTMENT (OUTPATIENT)
Dept: CT IMAGING | Facility: CLINIC | Age: 59
End: 2024-10-01
Payer: MEDICAID

## 2024-10-01 ENCOUNTER — APPOINTMENT (OUTPATIENT)
Dept: INTERNAL MEDICINE | Facility: CLINIC | Age: 59
End: 2024-10-01

## 2024-10-01 DIAGNOSIS — C01 MALIGNANT NEOPLASM OF BASE OF TONGUE: ICD-10-CM

## 2024-10-01 PROCEDURE — 70491 CT SOFT TISSUE NECK W/DYE: CPT | Mod: 26

## 2024-10-01 PROCEDURE — 71260 CT THORAX DX C+: CPT | Mod: 26

## 2024-10-04 ENCOUNTER — APPOINTMENT (OUTPATIENT)
Dept: PHYSICAL MEDICINE AND REHAB | Facility: CLINIC | Age: 59
End: 2024-10-04
Payer: MEDICAID

## 2024-10-04 DIAGNOSIS — Z91.89 OTHER SPECIFIED PERSONAL RISK FACTORS, NOT ELSEWHERE CLASSIFIED: ICD-10-CM

## 2024-10-04 DIAGNOSIS — K11.7 DISTURBANCES OF SALIVARY SECRETION: ICD-10-CM

## 2024-10-04 DIAGNOSIS — M79.2 NEURALGIA AND NEURITIS, UNSPECIFIED: ICD-10-CM

## 2024-10-04 PROCEDURE — 99214 OFFICE O/P EST MOD 30 MIN: CPT

## 2024-10-04 NOTE — HISTORY OF PRESENT ILLNESS
[FreeTextEntry1] : Mr. Moses is a 59 year old male with OPSCC (BOT, cT2 N1, left level 2A lymphadenopathy), undergoing definitive chemoradiation with weekly Cisplatin. He completed chemotherapy with cisplatin and radiation therapy in June 2022.  He reports that he did not obtain barium swallow test.  Reduce gabapentin now on 200 mg 3 times a day.  Still having some neuropathy symptoms and some stiffness in his neck but overall doing well on this dose no major side effects.  Denies any worsening of lymphedema significantly.

## 2024-10-04 NOTE — PHYSICAL EXAM
[FreeTextEntry1] : Gen: Patient is A&O x 3, NAD HEENT: EOMI, hearing grossly normal Resp: regular, non - labored CV: pulses regular Skin: no rashes, erythema  Lymph: no clubbing, cyanosis, edema, or palpable lymphadenopathy Inspection: no instability or misalignment ROM: mildly decrease cervical rotation and extension, Early fibrosis of neck Palpation: non- Tender to touch cervical region  Sensation: decreased to light touch in feet  Reflexes: 1+ and symmetric throughout Strength: 5/5 throughout Gait: normal, non-antalgic

## 2024-10-04 NOTE — REVIEW OF SYSTEMS
[Negative] : Heme/Lymph [FreeTextEntry4] : +Dry mouth  [FreeTextEntry9] : +neck stiffness [de-identified] : +numbness of toes

## 2024-10-04 NOTE — ASSESSMENT
[FreeTextEntry1] : 59 year old male presenting for follow-up  #Neuropathic pain/Neuropathy: -Continue Gabapentin 200mg TID-rx sent   #At risk for lymphedema: -No clinical signs of lymphedema -Lymphedema education provided to patient -Continue to monitor closely monitor  #Xerostomia -Advised to remain off of pilocarpine until cleared by pulmonology  #Dysphagia: -Pending MBS   Follow up in 3 month.

## 2024-10-08 ENCOUNTER — APPOINTMENT (OUTPATIENT)
Dept: PULMONOLOGY | Facility: CLINIC | Age: 59
End: 2024-10-08
Payer: MEDICAID

## 2024-10-08 VITALS
WEIGHT: 158 LBS | DIASTOLIC BLOOD PRESSURE: 70 MMHG | BODY MASS INDEX: 23.4 KG/M2 | HEIGHT: 69 IN | SYSTOLIC BLOOD PRESSURE: 110 MMHG

## 2024-10-08 PROCEDURE — 96372 THER/PROPH/DIAG INJ SC/IM: CPT

## 2024-10-08 RX ORDER — DUPILUMAB 300 MG/2ML
300 INJECTION, SOLUTION SUBCUTANEOUS
Refills: 0 | Status: COMPLETED | OUTPATIENT
Start: 2024-10-08

## 2024-10-08 RX ADMIN — DUPILUMAB 0 MG/2ML: 300 INJECTION, SOLUTION SUBCUTANEOUS at 00:00

## 2024-10-15 ENCOUNTER — NON-APPOINTMENT (OUTPATIENT)
Age: 59
End: 2024-10-15

## 2024-10-17 ENCOUNTER — NON-APPOINTMENT (OUTPATIENT)
Age: 59
End: 2024-10-17

## 2024-11-13 ENCOUNTER — OUTPATIENT (OUTPATIENT)
Dept: OUTPATIENT SERVICES | Facility: HOSPITAL | Age: 59
LOS: 1 days | Discharge: HOME | End: 2024-11-13

## 2024-11-13 DIAGNOSIS — C02.9 MALIGNANT NEOPLASM OF TONGUE, UNSPECIFIED: ICD-10-CM

## 2024-11-19 ENCOUNTER — APPOINTMENT (OUTPATIENT)
Dept: HEMATOLOGY ONCOLOGY | Facility: CLINIC | Age: 59
End: 2024-11-19
Payer: MEDICAID

## 2024-11-19 ENCOUNTER — RESULT REVIEW (OUTPATIENT)
Age: 59
End: 2024-11-19

## 2024-11-19 ENCOUNTER — LABORATORY RESULT (OUTPATIENT)
Age: 59
End: 2024-11-19

## 2024-11-19 ENCOUNTER — NON-APPOINTMENT (OUTPATIENT)
Age: 59
End: 2024-11-19

## 2024-11-19 VITALS
OXYGEN SATURATION: 97 % | BODY MASS INDEX: 23.54 KG/M2 | HEART RATE: 65 BPM | WEIGHT: 158.95 LBS | DIASTOLIC BLOOD PRESSURE: 73 MMHG | HEIGHT: 69 IN | TEMPERATURE: 98 F | SYSTOLIC BLOOD PRESSURE: 136 MMHG

## 2024-11-19 DIAGNOSIS — J45.909 UNSPECIFIED ASTHMA, UNCOMPLICATED: ICD-10-CM

## 2024-11-19 DIAGNOSIS — C01 MALIGNANT NEOPLASM OF BASE OF TONGUE: ICD-10-CM

## 2024-11-19 LAB
BASOPHILS # BLD AUTO: 0.1 K/UL — SIGNIFICANT CHANGE UP (ref 0–0.2)
BASOPHILS NFR BLD AUTO: 1 % — SIGNIFICANT CHANGE UP (ref 0–2)
EOSINOPHIL # BLD AUTO: 1 K/UL — HIGH (ref 0–0.5)
EOSINOPHIL NFR BLD AUTO: 13.3 % — HIGH (ref 0–6)
HCT VFR BLD CALC: 45.4 % — SIGNIFICANT CHANGE UP (ref 39–50)
HGB BLD-MCNC: 15.4 G/DL — SIGNIFICANT CHANGE UP (ref 13–17)
LYMPHOCYTES # BLD AUTO: 0.9 K/UL — LOW (ref 1–3.3)
LYMPHOCYTES # BLD AUTO: 12 % — LOW (ref 13–44)
MCHC RBC-ENTMCNC: 33.4 PG — SIGNIFICANT CHANGE UP (ref 27–34)
MCHC RBC-ENTMCNC: 34 G/DL — SIGNIFICANT CHANGE UP (ref 32–36)
MCV RBC AUTO: 98.3 FL — SIGNIFICANT CHANGE UP (ref 80–100)
MONOCYTES # BLD AUTO: 0.6 K/UL — SIGNIFICANT CHANGE UP (ref 0–0.9)
MONOCYTES NFR BLD AUTO: 7.6 % — SIGNIFICANT CHANGE UP (ref 2–14)
NEUTROPHILS # BLD AUTO: 4.8 K/UL — SIGNIFICANT CHANGE UP (ref 1.8–7.4)
NEUTROPHILS NFR BLD AUTO: 66 % — SIGNIFICANT CHANGE UP (ref 43–77)
PLATELET # BLD AUTO: 193 K/UL — SIGNIFICANT CHANGE UP (ref 150–400)
RBC # BLD: 4.62 M/UL — SIGNIFICANT CHANGE UP (ref 4.2–5.8)
RBC # FLD: 11.9 % — SIGNIFICANT CHANGE UP (ref 10.3–14.5)
WBC # BLD: 7.3 K/UL — SIGNIFICANT CHANGE UP (ref 3.8–10.5)
WBC # FLD AUTO: 7.3 K/UL — SIGNIFICANT CHANGE UP (ref 3.8–10.5)

## 2024-11-19 PROCEDURE — 99215 OFFICE O/P EST HI 40 MIN: CPT

## 2024-12-02 ENCOUNTER — OUTPATIENT (OUTPATIENT)
Dept: OUTPATIENT SERVICES | Facility: HOSPITAL | Age: 59
LOS: 1 days | End: 2024-12-02
Payer: MEDICAID

## 2024-12-02 DIAGNOSIS — R13.10 DYSPHAGIA, UNSPECIFIED: ICD-10-CM

## 2024-12-02 PROCEDURE — 74230 X-RAY XM SWLNG FUNCJ C+: CPT | Mod: 26

## 2024-12-02 PROCEDURE — 74230 X-RAY XM SWLNG FUNCJ C+: CPT

## 2024-12-02 NOTE — SWALLOW VFSS/MBS ASSESSMENT ADULT - DIAGNOSTIC IMPRESSIONS
Oral and pharyngeal stages of swallow are grossly functional, and demonstrate significant improvement as compared to prior assessment.     Oral stage of swallow negatively impacted by c/o xerostomia, however adequate bolus formation and mastication, especially given sip of liquid to moisten dry bolus. Piecemeal deglutition across trials.     Pharyngeal stage with timely swallow trigger, adequate pharyngeal constriction, and tongue base retraction. Trace vallecular and posterior wall stasis reduced with subsequent swallow and cleared with liquid wash. No penetration/aspiration of solids. Trace and inconsistent penetration of thin liquids ABOVE the cords, considered functional.

## 2024-12-02 NOTE — SWALLOW VFSS/MBS ASSESSMENT ADULT - SLP GENERAL OBSERVATIONS
Pt recd in Radiology Suite, A&A, oriented, cooperative and agreeable to evaluation, pain 0/10 pre/post

## 2024-12-02 NOTE — SWALLOW VFSS/MBS ASSESSMENT ADULT - ORAL PREP COMMENTS
c/o xerostomia, functional mastication and bolus formation achieved with sip of liquid to moisten bolus c/o xerostomia, however functional mastication and bolus formation

## 2024-12-02 NOTE — SWALLOW VFSS/MBS ASSESSMENT ADULT - ADDITIONAL RECOMMENDATIONS
**Ongoing monitoring of pulmonary status  **moisten dry solids to facilitate improved mastication in setting of xerostomia

## 2024-12-02 NOTE — SWALLOW VFSS/MBS ASSESSMENT ADULT - SLP PERTINENT HISTORY OF CURRENT PROBLEM
Pt known to this service, s/p a modified barium swallow study in May, 2024 at which time pt presented with a mild oral and moderate pharyngeal dysphagia. A soft/bite sized diet with mildly thick liquids utilizing a supraglottic swallow technique was recommended (please see full report for details). Pt now returns at the request of Dr. Tena to reassess swallow function. Pt reports he has been eating/drinking a regular diet with thin liquids. Occasional coughing is reported. Recent chest imaging indicated clear lungs. PMH is remarkable for SCC to BOT s/p chemo/XRT completed in June, 2022. Complete outpatient dysphagia tx in August, 2022. No weight loss reported, and pt reports, " I have been doing great...I'm eating and drinking what I want." Medical history is also remarkable for new diagnosis of COPD, Lyme disease, and asthma.

## 2024-12-23 ENCOUNTER — APPOINTMENT (OUTPATIENT)
Dept: PULMONOLOGY | Facility: CLINIC | Age: 59
End: 2024-12-23
Payer: MEDICAID

## 2024-12-23 VITALS
RESPIRATION RATE: 16 BRPM | OXYGEN SATURATION: 97 % | DIASTOLIC BLOOD PRESSURE: 70 MMHG | WEIGHT: 159 LBS | HEART RATE: 62 BPM | BODY MASS INDEX: 23.55 KG/M2 | SYSTOLIC BLOOD PRESSURE: 120 MMHG | HEIGHT: 69 IN

## 2024-12-23 DIAGNOSIS — D72.10 EOSINOPHILIA, UNSPECIFIED: ICD-10-CM

## 2024-12-23 DIAGNOSIS — J33.9 NASAL POLYP, UNSPECIFIED: ICD-10-CM

## 2024-12-23 DIAGNOSIS — J44.89 OTHER SPECIFIED CHRONIC OBSTRUCTIVE PULMONARY DISEASE: ICD-10-CM

## 2024-12-23 DIAGNOSIS — R76.8 OTHER SPECIFIED ABNORMAL IMMUNOLOGICAL FINDINGS IN SERUM: ICD-10-CM

## 2024-12-23 PROCEDURE — 99215 OFFICE O/P EST HI 40 MIN: CPT

## 2024-12-31 ENCOUNTER — RX RENEWAL (OUTPATIENT)
Age: 59
End: 2024-12-31

## 2024-12-31 RX ORDER — BUDESONIDE AND FORMOTEROL FUMARATE DIHYDRATE 160; 4.5 UG/1; UG/1
160-4.5 AEROSOL RESPIRATORY (INHALATION)
Qty: 30.6 | Refills: 0 | Status: ACTIVE | COMMUNITY
Start: 2024-12-31 | End: 1900-01-01

## 2025-01-14 ENCOUNTER — APPOINTMENT (OUTPATIENT)
Dept: PHYSICAL MEDICINE AND REHAB | Facility: CLINIC | Age: 60
End: 2025-01-14
Payer: MEDICAID

## 2025-01-14 VITALS
RESPIRATION RATE: 12 BRPM | SYSTOLIC BLOOD PRESSURE: 160 MMHG | HEART RATE: 92 BPM | WEIGHT: 159 LBS | HEIGHT: 69 IN | DIASTOLIC BLOOD PRESSURE: 74 MMHG | BODY MASS INDEX: 23.55 KG/M2

## 2025-01-14 DIAGNOSIS — R13.10 DYSPHAGIA, UNSPECIFIED: ICD-10-CM

## 2025-01-14 DIAGNOSIS — M79.2 NEURALGIA AND NEURITIS, UNSPECIFIED: ICD-10-CM

## 2025-01-14 DIAGNOSIS — Z91.89 OTHER SPECIFIED PERSONAL RISK FACTORS, NOT ELSEWHERE CLASSIFIED: ICD-10-CM

## 2025-01-14 PROCEDURE — 99214 OFFICE O/P EST MOD 30 MIN: CPT

## 2025-02-05 ENCOUNTER — APPOINTMENT (OUTPATIENT)
Dept: OTOLARYNGOLOGY | Facility: CLINIC | Age: 60
End: 2025-02-05
Payer: MEDICAID

## 2025-02-05 VITALS
WEIGHT: 159 LBS | DIASTOLIC BLOOD PRESSURE: 81 MMHG | SYSTOLIC BLOOD PRESSURE: 144 MMHG | HEIGHT: 69 IN | BODY MASS INDEX: 23.55 KG/M2

## 2025-02-05 DIAGNOSIS — R13.10 DYSPHAGIA, UNSPECIFIED: ICD-10-CM

## 2025-02-05 DIAGNOSIS — C01 MALIGNANT NEOPLASM OF BASE OF TONGUE: ICD-10-CM

## 2025-02-05 PROCEDURE — 99214 OFFICE O/P EST MOD 30 MIN: CPT | Mod: 25

## 2025-02-05 PROCEDURE — 31575 DIAGNOSTIC LARYNGOSCOPY: CPT

## 2025-02-10 ENCOUNTER — LABORATORY RESULT (OUTPATIENT)
Age: 60
End: 2025-02-10

## 2025-02-24 ENCOUNTER — APPOINTMENT (OUTPATIENT)
Dept: PULMONOLOGY | Facility: CLINIC | Age: 60
End: 2025-02-24

## 2025-02-28 ENCOUNTER — APPOINTMENT (OUTPATIENT)
Dept: PULMONOLOGY | Facility: CLINIC | Age: 60
End: 2025-02-28
Payer: MEDICAID

## 2025-02-28 VITALS
DIASTOLIC BLOOD PRESSURE: 72 MMHG | SYSTOLIC BLOOD PRESSURE: 142 MMHG | RESPIRATION RATE: 16 BRPM | HEART RATE: 74 BPM | OXYGEN SATURATION: 97 %

## 2025-02-28 VITALS — HEIGHT: 69 IN | BODY MASS INDEX: 23.55 KG/M2 | WEIGHT: 159 LBS

## 2025-02-28 DIAGNOSIS — D72.10 EOSINOPHILIA, UNSPECIFIED: ICD-10-CM

## 2025-02-28 DIAGNOSIS — J44.89 OTHER SPECIFIED CHRONIC OBSTRUCTIVE PULMONARY DISEASE: ICD-10-CM

## 2025-02-28 DIAGNOSIS — J45.909 UNSPECIFIED ASTHMA, UNCOMPLICATED: ICD-10-CM

## 2025-02-28 PROCEDURE — 94729 DIFFUSING CAPACITY: CPT

## 2025-02-28 PROCEDURE — 85018 HEMOGLOBIN: CPT | Mod: QW

## 2025-02-28 PROCEDURE — 99215 OFFICE O/P EST HI 40 MIN: CPT | Mod: 25

## 2025-02-28 PROCEDURE — 94727 GAS DIL/WSHOT DETER LNG VOL: CPT

## 2025-02-28 PROCEDURE — 94010 BREATHING CAPACITY TEST: CPT

## 2025-03-07 ENCOUNTER — OUTPATIENT (OUTPATIENT)
Dept: OUTPATIENT SERVICES | Facility: HOSPITAL | Age: 60
LOS: 1 days | Discharge: HOME | End: 2025-03-07

## 2025-03-07 DIAGNOSIS — C02.9 MALIGNANT NEOPLASM OF TONGUE, UNSPECIFIED: ICD-10-CM

## 2025-03-18 ENCOUNTER — RESULT REVIEW (OUTPATIENT)
Age: 60
End: 2025-03-18

## 2025-03-18 ENCOUNTER — APPOINTMENT (OUTPATIENT)
Dept: HEMATOLOGY ONCOLOGY | Facility: CLINIC | Age: 60
End: 2025-03-18
Payer: MEDICAID

## 2025-03-18 ENCOUNTER — NON-APPOINTMENT (OUTPATIENT)
Age: 60
End: 2025-03-18

## 2025-03-18 VITALS
TEMPERATURE: 97.7 F | SYSTOLIC BLOOD PRESSURE: 130 MMHG | WEIGHT: 163.69 LBS | DIASTOLIC BLOOD PRESSURE: 70 MMHG | HEART RATE: 73 BPM | HEIGHT: 69 IN | BODY MASS INDEX: 24.24 KG/M2 | OXYGEN SATURATION: 98 %

## 2025-03-18 DIAGNOSIS — C01 MALIGNANT NEOPLASM OF BASE OF TONGUE: ICD-10-CM

## 2025-03-18 LAB
BASOPHILS # BLD AUTO: 0.06 K/UL — SIGNIFICANT CHANGE UP (ref 0–0.2)
BASOPHILS NFR BLD AUTO: 0.7 % — SIGNIFICANT CHANGE UP (ref 0–2)
EOSINOPHIL # BLD AUTO: 0.7 K/UL — HIGH (ref 0–0.5)
EOSINOPHIL NFR BLD AUTO: 8.6 % — HIGH (ref 0–6)
HCT VFR BLD CALC: 47.3 % — SIGNIFICANT CHANGE UP (ref 39–50)
HGB BLD-MCNC: 15.4 G/DL — SIGNIFICANT CHANGE UP (ref 13–17)
IMM GRANULOCYTES # BLD AUTO: 0.02 K/UL — SIGNIFICANT CHANGE UP (ref 0–0.07)
IMM GRANULOCYTES NFR BLD AUTO: 0.2 % — SIGNIFICANT CHANGE UP (ref 0–0.9)
LYMPHOCYTES # BLD AUTO: 1.1 K/UL — SIGNIFICANT CHANGE UP (ref 1–3.3)
LYMPHOCYTES NFR BLD AUTO: 13.6 % — SIGNIFICANT CHANGE UP (ref 13–44)
MCHC RBC-ENTMCNC: 31.3 PG — SIGNIFICANT CHANGE UP (ref 27–34)
MCHC RBC-ENTMCNC: 32.6 G/DL — SIGNIFICANT CHANGE UP (ref 32–36)
MCV RBC AUTO: 96.1 FL — SIGNIFICANT CHANGE UP (ref 80–100)
MONOCYTES # BLD AUTO: 0.61 K/UL — SIGNIFICANT CHANGE UP (ref 0–0.9)
MONOCYTES NFR BLD AUTO: 7.5 % — SIGNIFICANT CHANGE UP (ref 2–14)
NEUTROPHILS # BLD AUTO: 5.61 K/UL — SIGNIFICANT CHANGE UP (ref 1.8–7.4)
NEUTROPHILS NFR BLD AUTO: 69.4 % — SIGNIFICANT CHANGE UP (ref 43–77)
NRBC # BLD AUTO: 0 K/UL — SIGNIFICANT CHANGE UP (ref 0–0)
NRBC # FLD: 0 K/UL — SIGNIFICANT CHANGE UP (ref 0–0)
NRBC BLD AUTO-RTO: 0 /100 WBCS — SIGNIFICANT CHANGE UP (ref 0–0)
PLATELET # BLD AUTO: 221 K/UL — SIGNIFICANT CHANGE UP (ref 150–400)
PMV BLD: 9.1 FL — SIGNIFICANT CHANGE UP (ref 7–13)
RBC # BLD: 4.92 M/UL — SIGNIFICANT CHANGE UP (ref 4.2–5.8)
RBC # FLD: 12.6 % — SIGNIFICANT CHANGE UP (ref 10.3–14.5)
WBC # BLD: 8.1 K/UL — SIGNIFICANT CHANGE UP (ref 3.8–10.5)
WBC # FLD AUTO: 8.1 K/UL — SIGNIFICANT CHANGE UP (ref 3.8–10.5)

## 2025-03-18 PROCEDURE — 99214 OFFICE O/P EST MOD 30 MIN: CPT

## 2025-03-19 LAB
ALBUMIN SERPL ELPH-MCNC: 4.7 G/DL
ALP BLD-CCNC: 86 U/L
ALT SERPL-CCNC: 18 U/L
ANION GAP SERPL CALC-SCNC: 13 MMOL/L
AST SERPL-CCNC: 21 U/L
BILIRUB SERPL-MCNC: 0.2 MG/DL
BUN SERPL-MCNC: 21 MG/DL
CALCIUM SERPL-MCNC: 10.3 MG/DL
CHLORIDE SERPL-SCNC: 99 MMOL/L
CO2 SERPL-SCNC: 29 MMOL/L
CREAT SERPL-MCNC: 0.68 MG/DL
EGFRCR SERPLBLD CKD-EPI 2021: 106 ML/MIN/1.73M2
GLUCOSE SERPL-MCNC: 83 MG/DL
MAGNESIUM SERPL-MCNC: 2.1 MG/DL
POTASSIUM SERPL-SCNC: 4.9 MMOL/L
PROT SERPL-MCNC: 7.6 G/DL
SODIUM SERPL-SCNC: 141 MMOL/L
TSH SERPL-ACNC: 3.12 UIU/ML

## 2025-03-28 ENCOUNTER — APPOINTMENT (OUTPATIENT)
Dept: INTERNAL MEDICINE | Facility: CLINIC | Age: 60
End: 2025-03-28

## 2025-03-28 ENCOUNTER — NON-APPOINTMENT (OUTPATIENT)
Age: 60
End: 2025-03-28

## 2025-03-28 ENCOUNTER — OUTPATIENT (OUTPATIENT)
Dept: OUTPATIENT SERVICES | Facility: HOSPITAL | Age: 60
LOS: 1 days | End: 2025-03-28

## 2025-04-11 ENCOUNTER — APPOINTMENT (OUTPATIENT)
Dept: PHYSICAL MEDICINE AND REHAB | Facility: CLINIC | Age: 60
End: 2025-04-11
Payer: MEDICAID

## 2025-04-11 DIAGNOSIS — Z91.89 OTHER SPECIFIED PERSONAL RISK FACTORS, NOT ELSEWHERE CLASSIFIED: ICD-10-CM

## 2025-04-11 DIAGNOSIS — M79.2 NEURALGIA AND NEURITIS, UNSPECIFIED: ICD-10-CM

## 2025-04-11 DIAGNOSIS — K11.7 DISTURBANCES OF SALIVARY SECRETION: ICD-10-CM

## 2025-04-11 PROCEDURE — 99214 OFFICE O/P EST MOD 30 MIN: CPT

## 2025-04-11 RX ORDER — GABAPENTIN 300 MG/1
300 CAPSULE ORAL 3 TIMES DAILY
Qty: 90 | Refills: 2 | Status: ACTIVE | COMMUNITY
Start: 2025-04-11 | End: 1900-01-01

## 2025-05-05 ENCOUNTER — APPOINTMENT (OUTPATIENT)
Dept: CT IMAGING | Facility: CLINIC | Age: 60
End: 2025-05-05

## 2025-05-05 ENCOUNTER — OUTPATIENT (OUTPATIENT)
Dept: OUTPATIENT SERVICES | Facility: HOSPITAL | Age: 60
LOS: 1 days | End: 2025-05-05
Payer: MEDICAID

## 2025-05-05 DIAGNOSIS — R13.10 DYSPHAGIA, UNSPECIFIED: ICD-10-CM

## 2025-05-05 PROCEDURE — 70491 CT SOFT TISSUE NECK W/DYE: CPT | Mod: 26

## 2025-06-17 ENCOUNTER — NON-APPOINTMENT (OUTPATIENT)
Age: 60
End: 2025-06-17

## 2025-06-17 ENCOUNTER — APPOINTMENT (OUTPATIENT)
Dept: RADIATION ONCOLOGY | Facility: CLINIC | Age: 60
End: 2025-06-17
Payer: MEDICAID

## 2025-06-17 VITALS
SYSTOLIC BLOOD PRESSURE: 137 MMHG | HEIGHT: 69 IN | DIASTOLIC BLOOD PRESSURE: 71 MMHG | HEART RATE: 73 BPM | OXYGEN SATURATION: 99 % | BODY MASS INDEX: 24.59 KG/M2 | RESPIRATION RATE: 16 BRPM | WEIGHT: 166 LBS

## 2025-06-17 PROCEDURE — 99214 OFFICE O/P EST MOD 30 MIN: CPT

## 2025-06-17 PROCEDURE — G2211 COMPLEX E/M VISIT ADD ON: CPT | Mod: NC

## 2025-06-23 ENCOUNTER — APPOINTMENT (OUTPATIENT)
Dept: PULMONOLOGY | Facility: CLINIC | Age: 60
End: 2025-06-23
Payer: MEDICAID

## 2025-06-23 VITALS
DIASTOLIC BLOOD PRESSURE: 80 MMHG | RESPIRATION RATE: 16 BRPM | HEART RATE: 76 BPM | OXYGEN SATURATION: 98 % | SYSTOLIC BLOOD PRESSURE: 140 MMHG

## 2025-06-23 VITALS — WEIGHT: 169 LBS | BODY MASS INDEX: 25.03 KG/M2 | HEIGHT: 69 IN

## 2025-06-23 PROCEDURE — 99214 OFFICE O/P EST MOD 30 MIN: CPT

## 2025-07-22 ENCOUNTER — APPOINTMENT (OUTPATIENT)
Dept: PHYSICAL MEDICINE AND REHAB | Facility: CLINIC | Age: 60
End: 2025-07-22
Payer: MEDICAID

## 2025-07-22 VITALS
HEIGHT: 69 IN | BODY MASS INDEX: 25.03 KG/M2 | SYSTOLIC BLOOD PRESSURE: 150 MMHG | WEIGHT: 169 LBS | HEART RATE: 85 BPM | DIASTOLIC BLOOD PRESSURE: 74 MMHG | RESPIRATION RATE: 14 BRPM

## 2025-07-22 DIAGNOSIS — K11.7 DISTURBANCES OF SALIVARY SECRETION: ICD-10-CM

## 2025-07-22 DIAGNOSIS — M79.2 NEURALGIA AND NEURITIS, UNSPECIFIED: ICD-10-CM

## 2025-07-22 DIAGNOSIS — Z91.89 OTHER SPECIFIED PERSONAL RISK FACTORS, NOT ELSEWHERE CLASSIFIED: ICD-10-CM

## 2025-07-22 PROCEDURE — 99214 OFFICE O/P EST MOD 30 MIN: CPT

## 2025-07-22 RX ORDER — PILOCARPINE HYDROCHLORIDE 5 MG/1
5 TABLET, FILM COATED ORAL
Qty: 90 | Refills: 2 | Status: ACTIVE | COMMUNITY
Start: 2025-07-22 | End: 1900-01-01

## 2025-07-24 ENCOUNTER — RX RENEWAL (OUTPATIENT)
Age: 60
End: 2025-07-24

## 2025-07-28 ENCOUNTER — APPOINTMENT (OUTPATIENT)
Dept: HEMATOLOGY ONCOLOGY | Facility: CLINIC | Age: 60
End: 2025-07-28

## 2025-08-25 ENCOUNTER — APPOINTMENT (OUTPATIENT)
Dept: PULMONOLOGY | Facility: CLINIC | Age: 60
End: 2025-08-25
Payer: MEDICAID

## 2025-08-25 VITALS
HEART RATE: 70 BPM | SYSTOLIC BLOOD PRESSURE: 129 MMHG | WEIGHT: 168 LBS | HEIGHT: 69 IN | RESPIRATION RATE: 16 BRPM | DIASTOLIC BLOOD PRESSURE: 73 MMHG | BODY MASS INDEX: 24.88 KG/M2 | OXYGEN SATURATION: 98 %

## 2025-08-25 DIAGNOSIS — R76.8 OTHER SPECIFIED ABNORMAL IMMUNOLOGICAL FINDINGS IN SERUM: ICD-10-CM

## 2025-08-25 DIAGNOSIS — D72.10 EOSINOPHILIA, UNSPECIFIED: ICD-10-CM

## 2025-08-25 DIAGNOSIS — J44.89 OTHER SPECIFIED CHRONIC OBSTRUCTIVE PULMONARY DISEASE: ICD-10-CM

## 2025-08-25 PROCEDURE — 99214 OFFICE O/P EST MOD 30 MIN: CPT

## 2025-08-29 ENCOUNTER — RESULT REVIEW (OUTPATIENT)
Age: 60
End: 2025-08-29

## 2025-08-29 ENCOUNTER — APPOINTMENT (OUTPATIENT)
Dept: HEMATOLOGY ONCOLOGY | Facility: CLINIC | Age: 60
End: 2025-08-29
Payer: MEDICAID

## 2025-08-29 ENCOUNTER — APPOINTMENT (OUTPATIENT)
Dept: HEMATOLOGY ONCOLOGY | Facility: CLINIC | Age: 60
End: 2025-08-29

## 2025-08-29 VITALS
HEIGHT: 69 IN | BODY MASS INDEX: 24.96 KG/M2 | OXYGEN SATURATION: 98 % | SYSTOLIC BLOOD PRESSURE: 159 MMHG | HEART RATE: 72 BPM | DIASTOLIC BLOOD PRESSURE: 65 MMHG | WEIGHT: 168.54 LBS

## 2025-08-29 DIAGNOSIS — C01 MALIGNANT NEOPLASM OF BASE OF TONGUE: ICD-10-CM

## 2025-08-29 PROCEDURE — G2211 COMPLEX E/M VISIT ADD ON: CPT | Mod: NC

## 2025-08-29 PROCEDURE — 99215 OFFICE O/P EST HI 40 MIN: CPT

## 2025-09-02 LAB
ALBUMIN SERPL ELPH-MCNC: 4.7 G/DL
ALP BLD-CCNC: 78 U/L
ALT SERPL-CCNC: 28 U/L
ANION GAP SERPL CALC-SCNC: 13 MMOL/L
AST SERPL-CCNC: 26 U/L
BILIRUB SERPL-MCNC: 0.5 MG/DL
BUN SERPL-MCNC: 21 MG/DL
CALCIUM SERPL-MCNC: 10.1 MG/DL
CHLORIDE SERPL-SCNC: 100 MMOL/L
CO2 SERPL-SCNC: 28 MMOL/L
CREAT SERPL-MCNC: 0.72 MG/DL
EGFRCR SERPLBLD CKD-EPI 2021: 104 ML/MIN/1.73M2
GLUCOSE SERPL-MCNC: 105 MG/DL
MAGNESIUM SERPL-MCNC: 2.2 MG/DL
POTASSIUM SERPL-SCNC: 4.4 MMOL/L
PROT SERPL-MCNC: 7.3 G/DL
SODIUM SERPL-SCNC: 140 MMOL/L
TSH SERPL-ACNC: 2.55 UIU/ML

## (undated) DEVICE — DRAPE 3/4 SHEET 52X76"

## (undated) DEVICE — WARMING BLANKET FULL ADULT

## (undated) DEVICE — Device

## (undated) DEVICE — GUARD TEETH ADULT

## (undated) DEVICE — ELCTR GROUNDING PAD ADULT COVIDIEN

## (undated) DEVICE — DRSG CURITY GAUZE SPONGE 4 X 4" 12-PLY

## (undated) DEVICE — SUCTION CAUTERY 12FR

## (undated) DEVICE — DRSG TELFA 3 X 8

## (undated) DEVICE — LABELS BLANK W PEN

## (undated) DEVICE — TAPE SILK 1"

## (undated) DEVICE — PACK T & A

## (undated) DEVICE — DRAPE INSTRUMENT POUCH 6.75" X 11"

## (undated) DEVICE — TUBING SUCTION NONCONDUCTIVE 6MM X 12FT

## (undated) DEVICE — ELCTR TUBE COAGULATION SUCTION 6"

## (undated) DEVICE — CANISTER DISPOSABLE THIN WALL 3000CC

## (undated) DEVICE — URETERAL CATH RED RUBBER 12FR (WHITE)

## (undated) DEVICE — VENODYNE/SCD SLEEVE CALF MEDIUM

## (undated) DEVICE — VALVE ENDOSCOPE DEFENDO SINGLE USE

## (undated) DEVICE — SOL IRR POUR NS 0.9% 500ML

## (undated) DEVICE — POSITIONER STRAP ARMBOARD VELCRO TS-30

## (undated) DEVICE — ELCTR BOVIE PENCIL SMOKE EVACUATION

## (undated) DEVICE — GLV 7 PROTEXIS (WHITE)

## (undated) DEVICE — DRAPE TOWEL BLUE 17" X 24"

## (undated) DEVICE — DRSG TELFA 4 X 8

## (undated) DEVICE — GOWN LG